# Patient Record
Sex: MALE | Race: WHITE | NOT HISPANIC OR LATINO | Employment: OTHER | URBAN - METROPOLITAN AREA
[De-identification: names, ages, dates, MRNs, and addresses within clinical notes are randomized per-mention and may not be internally consistent; named-entity substitution may affect disease eponyms.]

---

## 2023-06-06 DIAGNOSIS — Z00.00 ROUTINE GENERAL MEDICAL EXAMINATION AT A HEALTH CARE FACILITY: ICD-10-CM

## 2023-06-06 DIAGNOSIS — Z12.5 SCREENING PSA (PROSTATE SPECIFIC ANTIGEN): ICD-10-CM

## 2023-06-06 DIAGNOSIS — N18.30 STAGE 3 CHRONIC KIDNEY DISEASE, UNSPECIFIED WHETHER STAGE 3A OR 3B CKD (MULTI): ICD-10-CM

## 2023-06-06 DIAGNOSIS — I10 HYPERTENSION, UNSPECIFIED TYPE: ICD-10-CM

## 2023-06-06 PROBLEM — F32.A ACUTE DEPRESSION: Status: RESOLVED | Noted: 2023-06-06 | Resolved: 2023-06-06

## 2023-06-06 PROBLEM — K21.9 REFLUX LARYNGITIS: Status: ACTIVE | Noted: 2023-06-06

## 2023-06-06 PROBLEM — J20.9 ACUTE BRONCHITIS: Status: RESOLVED | Noted: 2023-06-06 | Resolved: 2023-06-06

## 2023-06-06 PROBLEM — H66.92 CHRONIC OTITIS MEDIA OF LEFT EAR: Status: RESOLVED | Noted: 2023-06-06 | Resolved: 2023-06-06

## 2023-06-06 PROBLEM — F41.1 ANXIETY IN ACUTE STRESS REACTION: Status: RESOLVED | Noted: 2023-06-06 | Resolved: 2023-06-06

## 2023-06-06 PROBLEM — C61 ADENOCARCINOMA OF PROSTATE (MULTI): Status: ACTIVE | Noted: 2023-06-06

## 2023-06-06 PROBLEM — H02.402 PTOSIS, LEFT EYELID: Status: ACTIVE | Noted: 2023-06-06

## 2023-06-06 PROBLEM — R05.3 CHRONIC COUGH: Status: ACTIVE | Noted: 2023-06-06

## 2023-06-06 PROBLEM — R09.A2 GLOBUS SYNDROME: Status: ACTIVE | Noted: 2023-06-06

## 2023-06-06 PROBLEM — R31.9 BLOOD IN THE URINE: Status: ACTIVE | Noted: 2023-06-06

## 2023-06-06 PROBLEM — U07.1 DISEASE DUE TO SEVERE ACUTE RESPIRATORY SYNDROME CORONAVIRUS 2 (SARS-COV-2): Status: RESOLVED | Noted: 2023-06-06 | Resolved: 2023-06-06

## 2023-06-06 PROBLEM — M66.242 NONTRAUMATIC RUPTURE OF SAGITTAL BAND OF EXTENSOR TENDON OF LEFT UPPER EXTREMITY: Status: ACTIVE | Noted: 2023-06-06

## 2023-06-06 PROBLEM — M25.819 SHOULDER IMPINGEMENT: Status: RESOLVED | Noted: 2023-06-06 | Resolved: 2023-06-06

## 2023-06-06 PROBLEM — R42 DIZZINESS: Status: ACTIVE | Noted: 2023-06-06

## 2023-06-06 PROBLEM — F43.0 ANXIETY IN ACUTE STRESS REACTION: Status: RESOLVED | Noted: 2023-06-06 | Resolved: 2023-06-06

## 2023-06-06 PROBLEM — R19.5 LOOSE STOOLS: Status: RESOLVED | Noted: 2023-06-06 | Resolved: 2023-06-06

## 2023-06-06 PROBLEM — J04.0 REFLUX LARYNGITIS: Status: ACTIVE | Noted: 2023-06-06

## 2023-06-06 PROBLEM — R06.09 DYSPNEA ON EXERTION: Status: ACTIVE | Noted: 2023-06-06

## 2023-06-06 PROBLEM — F43.21 ADJUSTMENT DISORDER WITH DEPRESSED MOOD: Status: ACTIVE | Noted: 2023-06-06

## 2023-06-06 PROBLEM — G60.9 IDIOPATHIC PERIPHERAL NEUROPATHY: Status: ACTIVE | Noted: 2023-06-06

## 2023-06-06 PROBLEM — H61.22 IMPACTED CERUMEN OF LEFT EAR: Status: RESOLVED | Noted: 2023-06-06 | Resolved: 2023-06-06

## 2023-06-06 PROBLEM — R44.3 HALLUCINATIONS: Status: ACTIVE | Noted: 2023-06-06

## 2023-06-06 PROBLEM — R93.1 ELEVATED CORONARY ARTERY CALCIUM SCORE: Status: ACTIVE | Noted: 2023-06-06

## 2023-06-06 PROBLEM — I50.30 HEART FAILURE WITH PRESERVED LEFT VENTRICULAR FUNCTION (HFPEF) (MULTI): Status: RESOLVED | Noted: 2023-06-06 | Resolved: 2023-06-06

## 2023-06-06 PROBLEM — H95.192 ENCOUNTER FOR DEBRIDEMENT OF LEFT POSTMASTOIDECTOMY CAVITY: Status: RESOLVED | Noted: 2023-06-06 | Resolved: 2023-06-06

## 2023-06-06 PROBLEM — F41.9 ANXIETY DISORDER: Status: ACTIVE | Noted: 2023-06-06

## 2023-06-06 PROBLEM — R49.0 DYSPHONIA: Status: ACTIVE | Noted: 2023-06-06

## 2023-06-06 PROBLEM — H01.009 BLEPHARITIS: Status: RESOLVED | Noted: 2023-06-06 | Resolved: 2023-06-06

## 2023-06-06 PROBLEM — R05.9 COUGH: Status: RESOLVED | Noted: 2023-06-06 | Resolved: 2023-06-06

## 2023-06-06 PROBLEM — M17.9 OSTEOARTHRITIS OF KNEE: Status: ACTIVE | Noted: 2023-06-06

## 2023-06-06 PROBLEM — M19.041 ARTHRITIS OF HAND, RIGHT, DEGENERATIVE: Status: ACTIVE | Noted: 2023-06-06

## 2023-06-06 PROBLEM — K44.9 HIATAL HERNIA: Status: ACTIVE | Noted: 2023-06-06

## 2023-06-06 PROBLEM — H10.9 CONJUNCTIVITIS: Status: RESOLVED | Noted: 2023-06-06 | Resolved: 2023-06-06

## 2023-06-06 PROBLEM — I25.10 MILD CAD: Status: ACTIVE | Noted: 2023-06-06

## 2023-06-06 PROBLEM — R55 NEAR SYNCOPE: Status: ACTIVE | Noted: 2023-06-06

## 2023-06-06 PROBLEM — G47.00 INSOMNIA: Status: ACTIVE | Noted: 2023-06-06

## 2023-06-06 PROBLEM — M47.812 CERVICAL SPONDYLOSIS WITHOUT MYELOPATHY: Status: ACTIVE | Noted: 2023-06-06

## 2023-06-06 PROBLEM — F32.3: Status: RESOLVED | Noted: 2023-06-06 | Resolved: 2023-06-06

## 2023-06-06 PROBLEM — D69.6 THROMBOCYTOPENIA (CMS-HCC): Status: ACTIVE | Noted: 2023-06-06

## 2023-06-06 PROBLEM — M65.342 TRIGGER RING FINGER OF LEFT HAND: Status: RESOLVED | Noted: 2023-06-06 | Resolved: 2023-06-06

## 2023-06-06 PROBLEM — I27.20 PULMONARY HYPERTENSION (MULTI): Status: ACTIVE | Noted: 2023-06-06

## 2023-06-06 PROBLEM — Z20.822 EXPOSURE TO COVID-19 VIRUS: Status: RESOLVED | Noted: 2023-06-06 | Resolved: 2023-06-06

## 2023-06-06 PROBLEM — R50.9 FEVER OF UNKNOWN ORIGIN: Status: RESOLVED | Noted: 2023-06-06 | Resolved: 2023-06-06

## 2023-06-06 PROBLEM — K21.9 ESOPHAGEAL REFLUX: Status: ACTIVE | Noted: 2023-06-06

## 2023-06-06 PROBLEM — R41.82 CHANGE IN MENTAL STATUS: Status: ACTIVE | Noted: 2023-06-06

## 2023-06-06 PROBLEM — M65.30 ACQUIRED TRIGGER FINGER: Status: RESOLVED | Noted: 2023-06-06 | Resolved: 2023-06-06

## 2023-06-06 PROBLEM — H66.90: Status: RESOLVED | Noted: 2023-06-06 | Resolved: 2023-06-06

## 2023-06-06 PROBLEM — M25.552 HIP PAIN, LEFT: Status: RESOLVED | Noted: 2023-06-06 | Resolved: 2023-06-06

## 2023-06-06 PROBLEM — H40.9 GLAUCOMA: Status: ACTIVE | Noted: 2023-06-06

## 2023-06-06 PROBLEM — E87.1 HYPONATREMIA: Status: ACTIVE | Noted: 2023-06-06

## 2023-06-06 PROBLEM — I87.2 VENOUS INSUFFICIENCY: Status: ACTIVE | Noted: 2023-06-06

## 2023-06-06 PROBLEM — M25.511 RIGHT SHOULDER PAIN: Status: RESOLVED | Noted: 2023-06-06 | Resolved: 2023-06-06

## 2023-06-06 PROBLEM — R27.0 ATAXIA: Status: ACTIVE | Noted: 2023-06-06

## 2023-06-06 PROBLEM — R04.2 HEMOPTYSIS: Status: ACTIVE | Noted: 2023-06-06

## 2023-06-06 PROBLEM — H61.92: Status: RESOLVED | Noted: 2023-06-06 | Resolved: 2023-06-06

## 2023-06-06 PROBLEM — H90.6 MIXED HEARING LOSS, BILATERAL: Status: ACTIVE | Noted: 2023-06-06

## 2023-06-06 PROBLEM — I50.9 CHF (CONGESTIVE HEART FAILURE) (MULTI): Status: ACTIVE | Noted: 2023-06-06

## 2023-06-06 PROBLEM — R35.0 URINARY FREQUENCY: Status: ACTIVE | Noted: 2023-06-06

## 2023-06-06 PROBLEM — R42 LIGHTHEADEDNESS: Status: RESOLVED | Noted: 2023-06-06 | Resolved: 2023-06-06

## 2023-06-06 PROBLEM — E78.00 ELEVATED CHOLESTEROL: Status: ACTIVE | Noted: 2023-06-06

## 2023-06-06 PROBLEM — K22.70 BARRETT'S ESOPHAGUS WITHOUT DYSPLASIA: Status: ACTIVE | Noted: 2023-06-06

## 2023-06-06 PROBLEM — J15.9 BACTERIAL PNEUMONIA: Status: RESOLVED | Noted: 2023-06-06 | Resolved: 2023-06-06

## 2023-06-06 PROBLEM — R60.9 EDEMA: Status: ACTIVE | Noted: 2023-06-06

## 2023-06-06 PROBLEM — R11.10 GASTRIC REGURGITATION: Status: ACTIVE | Noted: 2023-06-06

## 2023-06-06 PROBLEM — R14.3 FLATUS: Status: RESOLVED | Noted: 2023-06-06 | Resolved: 2023-06-06

## 2023-06-06 PROBLEM — K21.00 REFLUX ESOPHAGITIS: Status: ACTIVE | Noted: 2023-06-06

## 2023-06-06 PROBLEM — I49.8 VENTRICULAR BIGEMINY: Status: ACTIVE | Noted: 2023-06-06

## 2023-06-06 RX ORDER — NEPAFENAC 1 MG/ML
1 SUSPENSION/ DROPS OPHTHALMIC 3 TIMES DAILY
COMMUNITY

## 2023-06-06 RX ORDER — MULTIVIT-MIN/FA/LYCOPEN/LUTEIN .4-300-25
1 TABLET ORAL DAILY
COMMUNITY

## 2023-06-06 RX ORDER — METOPROLOL SUCCINATE 100 MG/1
1 TABLET, EXTENDED RELEASE ORAL 2 TIMES DAILY
COMMUNITY
Start: 2014-02-05 | End: 2023-06-19 | Stop reason: ALTCHOICE

## 2023-06-06 RX ORDER — DULOXETIN HYDROCHLORIDE 20 MG/1
20 CAPSULE, DELAYED RELEASE ORAL DAILY
COMMUNITY
End: 2023-06-19 | Stop reason: ALTCHOICE

## 2023-06-06 RX ORDER — LOSARTAN POTASSIUM 25 MG/1
1 TABLET ORAL DAILY
COMMUNITY
Start: 2021-10-18 | End: 2023-06-19 | Stop reason: ALTCHOICE

## 2023-06-06 RX ORDER — ISOSORBIDE MONONITRATE 60 MG/1
1 TABLET, EXTENDED RELEASE ORAL DAILY
COMMUNITY
Start: 2022-02-15 | End: 2023-06-19 | Stop reason: ALTCHOICE

## 2023-06-06 RX ORDER — FUROSEMIDE 20 MG/1
1 TABLET ORAL DAILY
COMMUNITY
Start: 2017-12-04 | End: 2023-10-27 | Stop reason: SDUPTHER

## 2023-06-06 RX ORDER — ASPIRIN 81 MG/1
1 TABLET ORAL DAILY
COMMUNITY
Start: 2017-12-12

## 2023-06-06 RX ORDER — ESCITALOPRAM OXALATE 10 MG/1
1 TABLET ORAL DAILY
COMMUNITY
Start: 2022-10-25 | End: 2024-02-19 | Stop reason: ALTCHOICE

## 2023-06-06 RX ORDER — ACETAMINOPHEN 500 MG
1 TABLET ORAL DAILY
COMMUNITY
End: 2024-05-17 | Stop reason: ALTCHOICE

## 2023-06-06 RX ORDER — MELATONIN 10 MG
1 CAPSULE ORAL NIGHTLY
COMMUNITY
Start: 2019-06-11

## 2023-06-06 RX ORDER — DICLOFENAC SODIUM 1 MG/ML
1 SOLUTION/ DROPS OPHTHALMIC
COMMUNITY
Start: 2020-10-20 | End: 2023-10-27 | Stop reason: WASHOUT

## 2023-06-12 ENCOUNTER — LAB (OUTPATIENT)
Dept: LAB | Facility: LAB | Age: 88
End: 2023-06-12
Payer: MEDICARE

## 2023-06-12 DIAGNOSIS — I10 HYPERTENSION, UNSPECIFIED TYPE: ICD-10-CM

## 2023-06-12 DIAGNOSIS — Z12.5 SCREENING PSA (PROSTATE SPECIFIC ANTIGEN): ICD-10-CM

## 2023-06-12 DIAGNOSIS — N18.30 STAGE 3 CHRONIC KIDNEY DISEASE, UNSPECIFIED WHETHER STAGE 3A OR 3B CKD (MULTI): ICD-10-CM

## 2023-06-12 LAB
ALANINE AMINOTRANSFERASE (SGPT) (U/L) IN SER/PLAS: 14 U/L (ref 10–52)
ALBUMIN (G/DL) IN SER/PLAS: 4.1 G/DL (ref 3.4–5)
ANION GAP IN SER/PLAS: 13 MMOL/L (ref 10–20)
APPEARANCE, URINE: CLEAR
ASPARTATE AMINOTRANSFERASE (SGOT) (U/L) IN SER/PLAS: 18 U/L (ref 9–39)
BASOPHILS (10*3/UL) IN BLOOD BY AUTOMATED COUNT: 0.05 X10E9/L (ref 0–0.1)
BASOPHILS/100 LEUKOCYTES IN BLOOD BY AUTOMATED COUNT: 0.5 % (ref 0–2)
BILIRUBIN, URINE: NEGATIVE
BLOOD, URINE: NEGATIVE
CALCIUM (MG/DL) IN SER/PLAS: 9.8 MG/DL (ref 8.6–10.6)
CARBON DIOXIDE, TOTAL (MMOL/L) IN SER/PLAS: 29 MMOL/L (ref 21–32)
CHLORIDE (MMOL/L) IN SER/PLAS: 99 MMOL/L (ref 98–107)
CHOLESTEROL (MG/DL) IN SER/PLAS: 145 MG/DL (ref 0–199)
CHOLESTEROL IN HDL (MG/DL) IN SER/PLAS: 46.4 MG/DL
CHOLESTEROL/HDL RATIO: 3.1
COLOR, URINE: YELLOW
CREATININE (MG/DL) IN SER/PLAS: 1.53 MG/DL (ref 0.5–1.3)
EOSINOPHILS (10*3/UL) IN BLOOD BY AUTOMATED COUNT: 0.54 X10E9/L (ref 0–0.4)
EOSINOPHILS/100 LEUKOCYTES IN BLOOD BY AUTOMATED COUNT: 5.6 % (ref 0–6)
ERYTHROCYTE DISTRIBUTION WIDTH (RATIO) BY AUTOMATED COUNT: 14.7 % (ref 11.5–14.5)
ERYTHROCYTE MEAN CORPUSCULAR HEMOGLOBIN CONCENTRATION (G/DL) BY AUTOMATED: 31.7 G/DL (ref 32–36)
ERYTHROCYTE MEAN CORPUSCULAR VOLUME (FL) BY AUTOMATED COUNT: 94 FL (ref 80–100)
ERYTHROCYTES (10*6/UL) IN BLOOD BY AUTOMATED COUNT: 4.2 X10E12/L (ref 4.5–5.9)
GFR MALE: 41 ML/MIN/1.73M2
GLUCOSE (MG/DL) IN SER/PLAS: 90 MG/DL (ref 74–99)
GLUCOSE, URINE: NEGATIVE MG/DL
HEMATOCRIT (%) IN BLOOD BY AUTOMATED COUNT: 39.4 % (ref 41–52)
HEMOGLOBIN (G/DL) IN BLOOD: 12.5 G/DL (ref 13.5–17.5)
IMMATURE GRANULOCYTES/100 LEUKOCYTES IN BLOOD BY AUTOMATED COUNT: 0.3 % (ref 0–0.9)
KETONES, URINE: NEGATIVE MG/DL
LDL: 71 MG/DL (ref 0–99)
LEUKOCYTE ESTERASE, URINE: NEGATIVE
LEUKOCYTES (10*3/UL) IN BLOOD BY AUTOMATED COUNT: 9.7 X10E9/L (ref 4.4–11.3)
LYMPHOCYTES (10*3/UL) IN BLOOD BY AUTOMATED COUNT: 1.65 X10E9/L (ref 0.8–3)
LYMPHOCYTES/100 LEUKOCYTES IN BLOOD BY AUTOMATED COUNT: 17 % (ref 13–44)
MONOCYTES (10*3/UL) IN BLOOD BY AUTOMATED COUNT: 0.79 X10E9/L (ref 0.05–0.8)
MONOCYTES/100 LEUKOCYTES IN BLOOD BY AUTOMATED COUNT: 8.1 % (ref 2–10)
NEUTROPHILS (10*3/UL) IN BLOOD BY AUTOMATED COUNT: 6.66 X10E9/L (ref 1.6–5.5)
NEUTROPHILS/100 LEUKOCYTES IN BLOOD BY AUTOMATED COUNT: 68.5 % (ref 40–80)
NITRITE, URINE: NEGATIVE
NRBC (PER 100 WBCS) BY AUTOMATED COUNT: 0 /100 WBC (ref 0–0)
PH, URINE: 5 (ref 5–8)
PHOSPHATE (MG/DL) IN SER/PLAS: 4 MG/DL (ref 2.5–4.9)
PLATELETS (10*3/UL) IN BLOOD AUTOMATED COUNT: 149 X10E9/L (ref 150–450)
POTASSIUM (MMOL/L) IN SER/PLAS: 4.8 MMOL/L (ref 3.5–5.3)
PROSTATE SPECIFIC AG (NG/ML) IN SER/PLAS: 2.71 NG/ML (ref 0–4)
PROTEIN, URINE: NEGATIVE MG/DL
SODIUM (MMOL/L) IN SER/PLAS: 136 MMOL/L (ref 136–145)
SPECIFIC GRAVITY, URINE: 1.01 (ref 1–1.03)
THYROTROPIN (MIU/L) IN SER/PLAS BY DETECTION LIMIT <= 0.05 MIU/L: 3.73 MIU/L (ref 0.44–3.98)
TRIGLYCERIDE (MG/DL) IN SER/PLAS: 138 MG/DL (ref 0–149)
UREA NITROGEN (MG/DL) IN SER/PLAS: 34 MG/DL (ref 6–23)
UROBILINOGEN, URINE: <2 MG/DL (ref 0–1.9)
VLDL: 28 MG/DL (ref 0–40)

## 2023-06-12 PROCEDURE — 84450 TRANSFERASE (AST) (SGOT): CPT

## 2023-06-12 PROCEDURE — 84443 ASSAY THYROID STIM HORMONE: CPT

## 2023-06-12 PROCEDURE — 84460 ALANINE AMINO (ALT) (SGPT): CPT

## 2023-06-12 PROCEDURE — 80061 LIPID PANEL: CPT

## 2023-06-12 PROCEDURE — 85025 COMPLETE CBC W/AUTO DIFF WBC: CPT

## 2023-06-12 PROCEDURE — 80069 RENAL FUNCTION PANEL: CPT

## 2023-06-12 PROCEDURE — 81003 URINALYSIS AUTO W/O SCOPE: CPT

## 2023-06-12 PROCEDURE — 36415 COLL VENOUS BLD VENIPUNCTURE: CPT

## 2023-06-12 PROCEDURE — G0103 PSA SCREENING: HCPCS

## 2023-06-19 ENCOUNTER — OFFICE VISIT (OUTPATIENT)
Dept: PRIMARY CARE | Facility: CLINIC | Age: 88
End: 2023-06-19
Payer: MEDICARE

## 2023-06-19 VITALS
HEART RATE: 68 BPM | BODY MASS INDEX: 30.29 KG/M2 | WEIGHT: 193 LBS | DIASTOLIC BLOOD PRESSURE: 60 MMHG | RESPIRATION RATE: 16 BRPM | SYSTOLIC BLOOD PRESSURE: 140 MMHG | HEIGHT: 67 IN

## 2023-06-19 DIAGNOSIS — R06.09 DYSPNEA ON EXERTION: ICD-10-CM

## 2023-06-19 DIAGNOSIS — N18.31 STAGE 3A CHRONIC KIDNEY DISEASE (MULTI): ICD-10-CM

## 2023-06-19 DIAGNOSIS — C61 ADENOCARCINOMA OF PROSTATE (MULTI): ICD-10-CM

## 2023-06-19 DIAGNOSIS — Z00.00 ROUTINE GENERAL MEDICAL EXAMINATION AT HEALTH CARE FACILITY: ICD-10-CM

## 2023-06-19 DIAGNOSIS — Z00.00 ROUTINE GENERAL MEDICAL EXAMINATION AT A HEALTH CARE FACILITY: Primary | ICD-10-CM

## 2023-06-19 DIAGNOSIS — K44.9 HIATAL HERNIA: ICD-10-CM

## 2023-06-19 DIAGNOSIS — K22.70 BARRETT'S ESOPHAGUS WITHOUT DYSPLASIA: ICD-10-CM

## 2023-06-19 DIAGNOSIS — I87.2 VENOUS INSUFFICIENCY: ICD-10-CM

## 2023-06-19 DIAGNOSIS — R27.0 ATAXIA: ICD-10-CM

## 2023-06-19 DIAGNOSIS — G60.9 IDIOPATHIC PERIPHERAL NEUROPATHY: ICD-10-CM

## 2023-06-19 DIAGNOSIS — F43.21 ADJUSTMENT DISORDER WITH DEPRESSED MOOD: ICD-10-CM

## 2023-06-19 DIAGNOSIS — I27.20 PULMONARY HYPERTENSION (MULTI): ICD-10-CM

## 2023-06-19 DIAGNOSIS — K21.9 GASTROESOPHAGEAL REFLUX DISEASE, UNSPECIFIED WHETHER ESOPHAGITIS PRESENT: ICD-10-CM

## 2023-06-19 DIAGNOSIS — I50.9 CONGESTIVE HEART FAILURE, UNSPECIFIED HF CHRONICITY, UNSPECIFIED HEART FAILURE TYPE (MULTI): ICD-10-CM

## 2023-06-19 DIAGNOSIS — H90.6 MIXED HEARING LOSS, BILATERAL: ICD-10-CM

## 2023-06-19 DIAGNOSIS — I10 HYPERTENSION, UNSPECIFIED TYPE: ICD-10-CM

## 2023-06-19 DIAGNOSIS — R60.0 LOCALIZED EDEMA: ICD-10-CM

## 2023-06-19 DIAGNOSIS — H40.9 GLAUCOMA, UNSPECIFIED GLAUCOMA TYPE, UNSPECIFIED LATERALITY: ICD-10-CM

## 2023-06-19 DIAGNOSIS — D69.6 THROMBOCYTOPENIA (CMS-HCC): ICD-10-CM

## 2023-06-19 DIAGNOSIS — E87.1 HYPONATREMIA: ICD-10-CM

## 2023-06-19 PROCEDURE — G0439 PPPS, SUBSEQ VISIT: HCPCS | Performed by: INTERNAL MEDICINE

## 2023-06-19 PROCEDURE — 1159F MED LIST DOCD IN RCRD: CPT | Performed by: INTERNAL MEDICINE

## 2023-06-19 PROCEDURE — 1126F AMNT PAIN NOTED NONE PRSNT: CPT | Performed by: INTERNAL MEDICINE

## 2023-06-19 PROCEDURE — 1160F RVW MEDS BY RX/DR IN RCRD: CPT | Performed by: INTERNAL MEDICINE

## 2023-06-19 PROCEDURE — 3078F DIAST BP <80 MM HG: CPT | Performed by: INTERNAL MEDICINE

## 2023-06-19 PROCEDURE — 3077F SYST BP >= 140 MM HG: CPT | Performed by: INTERNAL MEDICINE

## 2023-06-19 PROCEDURE — 99397 PER PM REEVAL EST PAT 65+ YR: CPT | Performed by: INTERNAL MEDICINE

## 2023-06-19 PROCEDURE — 1170F FXNL STATUS ASSESSED: CPT | Performed by: INTERNAL MEDICINE

## 2023-06-19 PROCEDURE — 99214 OFFICE O/P EST MOD 30 MIN: CPT | Performed by: INTERNAL MEDICINE

## 2023-06-19 PROCEDURE — 1036F TOBACCO NON-USER: CPT | Performed by: INTERNAL MEDICINE

## 2023-06-19 RX ORDER — METOPROLOL SUCCINATE 100 MG/1
100 TABLET, EXTENDED RELEASE ORAL DAILY
Start: 2023-06-19

## 2023-06-19 RX ORDER — SPIRONOLACTONE 25 MG/1
1 TABLET ORAL DAILY
COMMUNITY
Start: 2017-12-11 | End: 2024-05-01

## 2023-06-19 RX ORDER — TRAZODONE HYDROCHLORIDE 50 MG/1
TABLET ORAL
COMMUNITY
Start: 2022-02-14 | End: 2024-03-11 | Stop reason: SDUPTHER

## 2023-06-19 RX ORDER — MIRTAZAPINE 7.5 MG/1
TABLET, FILM COATED ORAL
COMMUNITY
Start: 2022-02-14 | End: 2023-12-11 | Stop reason: SDUPTHER

## 2023-06-19 RX ORDER — LOSARTAN POTASSIUM 100 MG/1
1 TABLET ORAL DAILY
COMMUNITY
End: 2023-06-19 | Stop reason: ALTCHOICE

## 2023-06-19 RX ORDER — OMEPRAZOLE 20 MG/1
20 CAPSULE, DELAYED RELEASE ORAL
COMMUNITY
Start: 2013-09-05 | End: 2024-03-07

## 2023-06-19 RX ORDER — LOSARTAN POTASSIUM 25 MG/1
25 TABLET ORAL DAILY
Start: 2023-06-19 | End: 2023-11-22

## 2023-06-19 RX ORDER — SIMVASTATIN 20 MG/1
1 TABLET, FILM COATED ORAL NIGHTLY
COMMUNITY
Start: 2016-05-02 | End: 2024-01-29

## 2023-06-19 RX ORDER — AMLODIPINE BESYLATE 10 MG/1
10 TABLET ORAL
COMMUNITY
End: 2023-06-19 | Stop reason: ALTCHOICE

## 2023-06-19 RX ORDER — TIMOLOL MALEATE 5 MG/ML
SOLUTION/ DROPS OPHTHALMIC EVERY 12 HOURS
COMMUNITY

## 2023-06-19 RX ORDER — ISOSORBIDE MONONITRATE 120 MG/1
TABLET, EXTENDED RELEASE ORAL
COMMUNITY
End: 2024-05-17 | Stop reason: ALTCHOICE

## 2023-06-19 RX ORDER — METOPROLOL SUCCINATE 50 MG/1
50 TABLET, EXTENDED RELEASE ORAL
COMMUNITY
End: 2024-02-19 | Stop reason: ALTCHOICE

## 2023-06-19 ASSESSMENT — PATIENT HEALTH QUESTIONNAIRE - PHQ9
2. FEELING DOWN, DEPRESSED OR HOPELESS: MORE THAN HALF THE DAYS
10. IF YOU CHECKED OFF ANY PROBLEMS, HOW DIFFICULT HAVE THESE PROBLEMS MADE IT FOR YOU TO DO YOUR WORK, TAKE CARE OF THINGS AT HOME, OR GET ALONG WITH OTHER PEOPLE: SOMEWHAT DIFFICULT
1. LITTLE INTEREST OR PLEASURE IN DOING THINGS: NOT AT ALL
SUM OF ALL RESPONSES TO PHQ9 QUESTIONS 1 AND 2: 2

## 2023-06-19 ASSESSMENT — ACTIVITIES OF DAILY LIVING (ADL)
DOING_HOUSEWORK: INDEPENDENT
BATHING: INDEPENDENT
DRESSING: INDEPENDENT
GROCERY_SHOPPING: INDEPENDENT
TAKING_MEDICATION: INDEPENDENT
MANAGING_FINANCES: INDEPENDENT

## 2023-06-19 ASSESSMENT — ENCOUNTER SYMPTOMS: OCCASIONAL FEELINGS OF UNSTEADINESS: 1

## 2023-06-19 NOTE — PATIENT INSTRUCTIONS
Overall you are in good health today for your age and chronic problems  Age and gender appropriate wellness services reviewed  Your heart function is satisfactory on current medical therapy  Age and gender appropriate cancer screening prevention is up-to-date without evidence for recurrent prostate cancer  Immunizations are up-to-date  Kidney function is appropriate for age  At risk for falls, aware with proper use of cane  Adjustment disorder with depressed mood relative to wife's death doing as well as expected  I agree with consideration of assisted living, appropriate candidate who would benefit

## 2023-06-20 PROBLEM — R60.0 BILATERAL LOWER EXTREMITY EDEMA: Status: ACTIVE | Noted: 2023-06-20

## 2023-06-20 PROBLEM — H04.223 EPIPHORA DUE TO INSUFFICIENT DRAINAGE OF BOTH SIDES: Status: ACTIVE | Noted: 2018-03-07

## 2023-06-20 PROBLEM — E78.5 HYPERLIPIDEMIA: Status: RESOLVED | Noted: 2023-06-20 | Resolved: 2023-06-20

## 2023-06-20 PROBLEM — H02.106 ECTROPION DUE TO LAXITY OF LEFT EYELID: Status: ACTIVE | Noted: 2018-05-24

## 2023-06-20 PROBLEM — H02.403 PTOSIS OF EYELID, BILATERAL: Status: RESOLVED | Noted: 2017-09-13 | Resolved: 2023-06-20

## 2023-06-20 PROBLEM — K21.9 GERD (GASTROESOPHAGEAL REFLUX DISEASE): Status: RESOLVED | Noted: 2023-06-20 | Resolved: 2023-06-20

## 2023-06-20 PROBLEM — H02.135 SENILE ECTROPION OF LEFT LOWER EYELID: Status: RESOLVED | Noted: 2018-03-07 | Resolved: 2023-06-20

## 2023-07-20 ASSESSMENT — ENCOUNTER SYMPTOMS
CHEST TIGHTNESS: 0
FEVER: 0
ENDOCRINE NEGATIVE: 1
VOICE CHANGE: 0
UNEXPECTED WEIGHT CHANGE: 0
LIGHT-HEADEDNESS: 0
NUMBNESS: 0
HEADACHES: 0
RESPIRATORY NEGATIVE: 1
BACK PAIN: 0
DEPRESSION: 1
FATIGUE: 0
DIARRHEA: 0
DIZZINESS: 0
ABDOMINAL PAIN: 0
GASTROINTESTINAL NEGATIVE: 1
ARTHRALGIAS: 0
CONFUSION: 0
COUGH: 0
LOSS OF SENSATION IN FEET: 1
DYSPHORIC MOOD: 1
SLEEP DISTURBANCE: 0
SORE THROAT: 0
WEAKNESS: 0
TROUBLE SWALLOWING: 0
CARDIOVASCULAR NEGATIVE: 1
NERVOUS/ANXIOUS: 0
TREMORS: 0
CONSTITUTIONAL NEGATIVE: 1
VOMITING: 0
NAUSEA: 0
HALLUCINATIONS: 0
SHORTNESS OF BREATH: 0
ABDOMINAL DISTENTION: 0
CONSTIPATION: 0
HEMATURIA: 0
FREQUENCY: 1

## 2023-07-20 ASSESSMENT — ACTIVITIES OF DAILY LIVING (ADL)
DRESSING: INDEPENDENT
BATHING: INDEPENDENT

## 2023-07-20 ASSESSMENT — PATIENT HEALTH QUESTIONNAIRE - PHQ9
SUM OF ALL RESPONSES TO PHQ9 QUESTIONS 1 AND 2: 2
2. FEELING DOWN, DEPRESSED OR HOPELESS: SEVERAL DAYS
1. LITTLE INTEREST OR PLEASURE IN DOING THINGS: SEVERAL DAYS

## 2023-07-20 NOTE — PROGRESS NOTES
Subjective   Patient ID: TORIE Watts is a 89 y.o. male who presents for Medicare Annual Wellness Visit Subsequent.  HPI  Wife -considering moving to [facility], Dr. Sandy  PSA?  Check heart and kidney function  Hiatal hernia improved with the omeprazole, no heartburn or nagging cough    LEGACY VISIT 6/15/2022  Review of Systems   Constitutional: Negative.  Negative for fatigue, fever and unexpected weight change.        Weight 194 pounds   HENT:  Positive for hearing loss. Negative for dental problem, sore throat, tinnitus, trouble swallowing and voice change.    Eyes:  Negative for visual disturbance.        Eye exam up-to-date   Respiratory: Negative.  Negative for cough, chest tightness and shortness of breath.    Cardiovascular: Negative.    Gastrointestinal: Negative.  Negative for abdominal distention, abdominal pain, constipation, diarrhea, nausea and vomiting.        Metamucil helps   Endocrine: Negative.    Genitourinary:  Positive for frequency. Negative for hematuria, scrotal swelling and testicular pain.        Nocturia   Musculoskeletal:  Positive for gait problem. Negative for arthralgias and back pain.        Uses cane   Skin: Negative.         Dermatology consult up-to-date   Neurological:  Negative for dizziness, tremors, weakness, light-headedness, numbness and headaches.   Psychiatric/Behavioral:  Positive for dysphoric mood. Negative for confusion, hallucinations, sleep disturbance and suicidal ideas. The patient is not nervous/anxious.      Objective   Physical Exam  Vitals reviewed.   Constitutional:       General: He is not in acute distress.     Appearance: Normal appearance.   HENT:      Head: Normocephalic.      Right Ear: Tympanic membrane normal.      Left Ear: Tympanic membrane normal.      Ears:      Comments: Bilateral hearing aids     Nose: Nose normal.      Mouth/Throat:      Mouth: Mucous membranes are moist.      Pharynx: Oropharynx is clear.   Eyes:       General: No scleral icterus.     Extraocular Movements: Extraocular movements intact.      Conjunctiva/sclera: Conjunctivae normal.   Neck:      Vascular: No carotid bruit.      Comments: No thyromegaly or JVD  Cardiovascular:      Rate and Rhythm: Normal rate and regular rhythm.      Pulses: Normal pulses.      Heart sounds: Normal heart sounds. No murmur heard.  Pulmonary:      Effort: Pulmonary effort is normal.      Breath sounds: Normal breath sounds. No wheezing, rhonchi or rales.   Chest:      Comments: Mild gynecomastia  Abdominal:      General: Abdomen is flat. Bowel sounds are normal. There is no distension.      Palpations: Abdomen is soft. There is no mass.      Tenderness: There is no abdominal tenderness. There is no guarding.      Hernia: No hernia is present.   Genitourinary:     Penis: Normal.       Testes: Normal.      Comments: Circumcised  External anal inspection normal, no WU  Musculoskeletal:         General: Deformity present.      Cervical back: Normal range of motion and neck supple. No tenderness.      Right lower leg: Edema present.      Left lower leg: Edema present.      Comments: Bilateral knee surgical scars  Mild ankle edema support hose   Lymphadenopathy:      Cervical: No cervical adenopathy.   Skin:     General: Skin is warm.      Capillary Refill: Capillary refill takes less than 2 seconds.      Comments: Diffuse keratosis and nevi   Neurological:      General: No focal deficit present.      Mental Status: He is alert and oriented to person, place, and time.      Cranial Nerves: No cranial nerve deficit.      Sensory: Sensory deficit present.      Motor: No weakness.      Coordination: Coordination normal.      Gait: Gait abnormal.      Deep Tendon Reflexes: Reflexes normal.      Comments: Decreased distal vibratory and position sense   Psychiatric:         Mood and Affect: Mood normal.         Behavior: Behavior normal.         Thought Content: Thought content normal.          "Judgment: Judgment normal.       /60 (BP Location: Left arm, Patient Position: Sitting)   Pulse 68   Resp 16   Ht 1.702 m (5' 7\")   Wt 87.5 kg (193 lb)   BMI 30.23 kg/m²     Data  Electrocardiogram 1/5/2023  Laboratory 6/12 creatinine 1.5, GFR 41, lipid normal, PSA 2.7, hemoglobin/hematocrit 12.5/39, platelets 149 K  Psychiatry consult 6/5 reviewed  GI consult 4/5 reviewed  Cardiology consult 3/14 reviewed  Nephrology consult 3/10 reviewed  Ophthalmology consult 11/9/2021 reviewed  CT head 6/29/2017  CT internal auditory canal 5/27/2011  MRI internal auditory canal 10/19/2012  Echocardiogram 11/16/2017  Chest x-ray 3/28/2019  X-ray cervical spine 1/20/2023  Ultrasound kidneys 4/15/2021  EGD 2/3/2015  Audiology 3/3/2021    Assessment/Plan     #1 overall you are in good health today for your advanced age and chronic medical problems which are compensated on the current therapy  #2 you have mild weakened heart muscle or cardiomyopathy which is well compensated and improved on current medical therapy, continue current medical therapy and lifestyle changes including low-sodium diet, leg elevation/support and change positions against gravity slowly carefully and deliberately  #3 age and gender appropriate cancer screening and prevention is up-to-date with no clinical evidence of recurrent prostate cancer and skin follow-up  #4 immunizations are up-to-date  #5 idiopathic peripheral neuropathy-numbness and tingling, loss of position sense in the legs, may increase risk of falls  #6 peripheral edema most likely multifactorial cardiomyopathy, chronic venous insufficiency, improved with conservative care  #7 hyponatremia-maintains near normal/low normal  #8 gynecomastia-likely secondary to advanced age, low testosterone and/or side effect of spironolactone  #9 at risk for falls secondary to advanced age, peripheral neuropathy, polypharmacy, underlying cardiovascular disease; falls precautions reinforced use of cane " safe environment  #10 depression doing well as expected, coping with wife's death and exploring his future options  11 chronic kidney disease stable; maintain proper fluid balance, blood pressure, avoid/limit potential medications or x-ray contrast which may affect kidneys, and be aware of medications to be adjusted for your level of kidney function  12 patient has living will and DURABLE POWER OF  for healthcare  Problem List Items Addressed This Visit       Adenocarcinoma of prostate (CMS/[facility])    Adjustment disorder with depressed mood    Ataxia    Hinkle's esophagus without dysplasia    CKD (chronic kidney disease) stage 3, GFR 30-59 ml/min (CMS/[facility])    Dyspnea on exertion - Primary    Relevant Medications    losartan (Cozaar) 25 mg tablet    metoprolol succinate XL (Toprol-XL) 100 mg 24 hr tablet    Edema    Glaucoma    CHF (congestive heart failure) (CMS/HCC)    Relevant Medications    isosorbide mononitrate ER (Imdur) 120 mg 24 hr tablet    metoprolol succinate XL (Toprol-XL) 50 mg 24 hr tablet    metoprolol succinate XL (Toprol-XL) 100 mg 24 hr tablet    Esophageal reflux    Hiatal hernia    Hyponatremia    Idiopathic peripheral neuropathy    Mixed hearing loss, bilateral    Hypertension    Pulmonary hypertension (CMS/HCC)    Thrombocytopenia (CMS/HCC)    Venous insufficiency

## 2023-10-16 ENCOUNTER — TELEPHONE (OUTPATIENT)
Dept: PRIMARY CARE | Facility: CLINIC | Age: 88
End: 2023-10-16
Payer: MEDICARE

## 2023-10-16 DIAGNOSIS — N18.32 STAGE 3B CHRONIC KIDNEY DISEASE (MULTI): Primary | ICD-10-CM

## 2023-10-20 ENCOUNTER — OFFICE VISIT (OUTPATIENT)
Dept: PRIMARY CARE | Facility: CLINIC | Age: 88
End: 2023-10-20
Payer: MEDICARE

## 2023-10-20 DIAGNOSIS — R60.0 LOCALIZED EDEMA: ICD-10-CM

## 2023-10-20 DIAGNOSIS — N18.32 STAGE 3B CHRONIC KIDNEY DISEASE (MULTI): ICD-10-CM

## 2023-10-20 DIAGNOSIS — L57.0 ACTINIC KERATOSES: Primary | ICD-10-CM

## 2023-10-20 DIAGNOSIS — E87.1 HYPONATREMIA: ICD-10-CM

## 2023-10-20 LAB
BASOPHILS # BLD AUTO: 0.05 X10*3/UL (ref 0–0.1)
BASOPHILS NFR BLD AUTO: 0.6 %
EOSINOPHIL # BLD AUTO: 0.56 X10*3/UL (ref 0–0.4)
EOSINOPHIL NFR BLD AUTO: 6.2 %
ERYTHROCYTE [DISTWIDTH] IN BLOOD BY AUTOMATED COUNT: 13.6 % (ref 11.5–14.5)
HCT VFR BLD AUTO: 38.3 % (ref 41–52)
HGB BLD-MCNC: 11.7 G/DL (ref 13.5–17.5)
IMM GRANULOCYTES # BLD AUTO: 0.03 X10*3/UL (ref 0–0.5)
IMM GRANULOCYTES NFR BLD AUTO: 0.3 % (ref 0–0.9)
LYMPHOCYTES # BLD AUTO: 1.48 X10*3/UL (ref 0.8–3)
LYMPHOCYTES NFR BLD AUTO: 16.3 %
MCH RBC QN AUTO: 29.6 PG (ref 26–34)
MCHC RBC AUTO-ENTMCNC: 30.5 G/DL (ref 32–36)
MCV RBC AUTO: 97 FL (ref 80–100)
MONOCYTES # BLD AUTO: 0.92 X10*3/UL (ref 0.05–0.8)
MONOCYTES NFR BLD AUTO: 10.1 %
NEUTROPHILS # BLD AUTO: 6.03 X10*3/UL (ref 1.6–5.5)
NEUTROPHILS NFR BLD AUTO: 66.5 %
NRBC BLD-RTO: 0 /100 WBCS (ref 0–0)
PLATELET # BLD AUTO: 236 X10*3/UL (ref 150–450)
PMV BLD AUTO: 11.1 FL (ref 7.5–11.5)
RBC # BLD AUTO: 3.95 X10*6/UL (ref 4.5–5.9)
WBC # BLD AUTO: 9.1 X10*3/UL (ref 4.4–11.3)

## 2023-10-20 PROCEDURE — 36415 COLL VENOUS BLD VENIPUNCTURE: CPT

## 2023-10-20 PROCEDURE — 1159F MED LIST DOCD IN RCRD: CPT | Performed by: INTERNAL MEDICINE

## 2023-10-20 PROCEDURE — 3075F SYST BP GE 130 - 139MM HG: CPT | Performed by: INTERNAL MEDICINE

## 2023-10-20 PROCEDURE — 1126F AMNT PAIN NOTED NONE PRSNT: CPT | Performed by: INTERNAL MEDICINE

## 2023-10-20 PROCEDURE — 99213 OFFICE O/P EST LOW 20 MIN: CPT | Performed by: INTERNAL MEDICINE

## 2023-10-20 PROCEDURE — 1160F RVW MEDS BY RX/DR IN RCRD: CPT | Performed by: INTERNAL MEDICINE

## 2023-10-20 PROCEDURE — 3078F DIAST BP <80 MM HG: CPT | Performed by: INTERNAL MEDICINE

## 2023-10-20 PROCEDURE — 85025 COMPLETE CBC W/AUTO DIFF WBC: CPT

## 2023-10-20 PROCEDURE — 1036F TOBACCO NON-USER: CPT | Performed by: INTERNAL MEDICINE

## 2023-10-20 PROCEDURE — 80069 RENAL FUNCTION PANEL: CPT

## 2023-10-20 NOTE — PROGRESS NOTES
"Subjective   Patient ID: Jf Watts \"ZORAN" is a 89 y.o. male who presents for Follow-up.  HPI  Stay at home   Cook   2 water pills for fluid balance  Wgt stable to up     Review of Systems   Constitutional: Negative.  Negative for fatigue, fever and unexpected weight change.   HENT:  Positive for hearing loss.    Respiratory: Negative.  Negative for cough, chest tightness and shortness of breath.    Cardiovascular: Negative.    Gastrointestinal: Negative.  Negative for abdominal distention, abdominal pain, constipation, diarrhea, nausea and vomiting.        Metamucil helps   Endocrine: Negative.    Genitourinary:  Positive for frequency. Negative for hematuria, scrotal swelling and testicular pain.        Nocturia   Musculoskeletal:  Positive for gait problem. Negative for arthralgias and back pain.        Uses cane   Neurological:  Negative for dizziness, tremors, weakness, light-headedness, numbness and headaches.   Psychiatric/Behavioral:  Positive for dysphoric mood. Negative for confusion, hallucinations, sleep disturbance and suicidal ideas. The patient is not nervous/anxious.         Improved, managing affairs, withdrawn       Objective   Physical Exam  Constitutional:       Appearance: Normal appearance.   Cardiovascular:      Rate and Rhythm: Normal rate and regular rhythm.      Heart sounds: Normal heart sounds.   Pulmonary:      Effort: Pulmonary effort is normal.      Breath sounds: Normal breath sounds.   Abdominal:      General: Abdomen is flat. Bowel sounds are normal.      Palpations: Abdomen is soft.   Musculoskeletal:      Right lower leg: Edema present.      Left lower leg: Edema present.      Comments: Compression stockings, nontender   Skin:     Findings: Lesion present.      Comments: Multiple keratosis and nevi   Neurological:      General: No focal deficit present.      Mental Status: He is alert.      Gait: Gait abnormal.      Comments: Ambulatory with cane       /76   Pulse 72  "  Resp 16   Wt 88.9 kg (196 lb)   BMI 30.70 kg/m²     Data  Labs 6/12 satisfactory creatinine 1.5, GFR 41, sodium 136    Assessment/Plan   Problem List Items Addressed This Visit       CKD (chronic kidney disease) stage 3, GFR 30-59 ml/min (CMS/HCC)    Edema    Hyponatremia    Actinic keratoses - Primary    Relevant Orders    Referral to Dermatology

## 2023-10-21 LAB
ALBUMIN SERPL BCP-MCNC: 4.2 G/DL (ref 3.4–5)
ANION GAP SERPL CALC-SCNC: 15 MMOL/L (ref 10–20)
BUN SERPL-MCNC: 37 MG/DL (ref 6–23)
CALCIUM SERPL-MCNC: 9.5 MG/DL (ref 8.6–10.6)
CHLORIDE SERPL-SCNC: 98 MMOL/L (ref 98–107)
CO2 SERPL-SCNC: 29 MMOL/L (ref 21–32)
CREAT SERPL-MCNC: 1.87 MG/DL (ref 0.5–1.3)
GFR SERPL CREATININE-BSD FRML MDRD: 32 ML/MIN/1.73M*2
GLUCOSE SERPL-MCNC: 100 MG/DL (ref 74–99)
PHOSPHATE SERPL-MCNC: 3.7 MG/DL (ref 2.5–4.9)
POTASSIUM SERPL-SCNC: 4.9 MMOL/L (ref 3.5–5.3)
SODIUM SERPL-SCNC: 137 MMOL/L (ref 136–145)

## 2023-10-24 NOTE — PROGRESS NOTES
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 The cavity was cleaned from the significant amount of debris.     I will see him in 1 year.      Chief Complaint     Follow-up regarding some hearing issues      History of Present Illness    This gentleman is a friend of mine. He has had a mastoidectomy on the left side done many years ago and occasionally it needs to be cleaned. He has not had any acute issues recently. I last saw him in July 2022.      Physical Exam    The ear examination is normal on the right. On the left side he does have a large mastoid cavity. There was significant debris to clean out and this was addressed with different instruments. There is no evidence of any infection.

## 2023-10-25 ENCOUNTER — OFFICE VISIT (OUTPATIENT)
Dept: OTOLARYNGOLOGY | Facility: CLINIC | Age: 88
End: 2023-10-25
Payer: MEDICARE

## 2023-10-25 DIAGNOSIS — H93.92: Primary | ICD-10-CM

## 2023-10-25 PROCEDURE — 69220 CLEAN OUT MASTOID CAVITY: CPT | Performed by: OTOLARYNGOLOGY

## 2023-10-25 PROCEDURE — 1160F RVW MEDS BY RX/DR IN RCRD: CPT | Performed by: OTOLARYNGOLOGY

## 2023-10-25 PROCEDURE — 99213 OFFICE O/P EST LOW 20 MIN: CPT | Performed by: OTOLARYNGOLOGY

## 2023-10-25 PROCEDURE — 1159F MED LIST DOCD IN RCRD: CPT | Performed by: OTOLARYNGOLOGY

## 2023-10-25 PROCEDURE — 1036F TOBACCO NON-USER: CPT | Performed by: OTOLARYNGOLOGY

## 2023-10-25 PROCEDURE — 1126F AMNT PAIN NOTED NONE PRSNT: CPT | Performed by: OTOLARYNGOLOGY

## 2023-10-25 ASSESSMENT — PATIENT HEALTH QUESTIONNAIRE - PHQ9
1. LITTLE INTEREST OR PLEASURE IN DOING THINGS: NEARLY EVERY DAY
SUM OF ALL RESPONSES TO PHQ9 QUESTIONS 1 AND 2: 6
2. FEELING DOWN, DEPRESSED OR HOPELESS: NEARLY EVERY DAY

## 2023-10-27 ENCOUNTER — OFFICE VISIT (OUTPATIENT)
Dept: CARDIOLOGY | Facility: CLINIC | Age: 88
End: 2023-10-27
Payer: MEDICARE

## 2023-10-27 VITALS
SYSTOLIC BLOOD PRESSURE: 135 MMHG | OXYGEN SATURATION: 94 % | HEART RATE: 56 BPM | BODY MASS INDEX: 32.54 KG/M2 | HEIGHT: 66 IN | WEIGHT: 202.44 LBS | DIASTOLIC BLOOD PRESSURE: 65 MMHG

## 2023-10-27 DIAGNOSIS — I50.9 CONGESTIVE HEART FAILURE, UNSPECIFIED HF CHRONICITY, UNSPECIFIED HEART FAILURE TYPE (MULTI): Primary | ICD-10-CM

## 2023-10-27 PROCEDURE — 1159F MED LIST DOCD IN RCRD: CPT | Performed by: INTERNAL MEDICINE

## 2023-10-27 PROCEDURE — 3078F DIAST BP <80 MM HG: CPT | Performed by: INTERNAL MEDICINE

## 2023-10-27 PROCEDURE — 99214 OFFICE O/P EST MOD 30 MIN: CPT | Performed by: INTERNAL MEDICINE

## 2023-10-27 PROCEDURE — 3075F SYST BP GE 130 - 139MM HG: CPT | Performed by: INTERNAL MEDICINE

## 2023-10-27 PROCEDURE — 1036F TOBACCO NON-USER: CPT | Performed by: INTERNAL MEDICINE

## 2023-10-27 PROCEDURE — 1126F AMNT PAIN NOTED NONE PRSNT: CPT | Performed by: INTERNAL MEDICINE

## 2023-10-27 PROCEDURE — 1160F RVW MEDS BY RX/DR IN RCRD: CPT | Performed by: INTERNAL MEDICINE

## 2023-10-27 RX ORDER — FUROSEMIDE 20 MG/1
60 TABLET ORAL DAILY
Qty: 270 TABLET | Refills: 1 | Status: SHIPPED | OUTPATIENT
Start: 2023-10-27 | End: 2023-12-18 | Stop reason: SDUPTHER

## 2023-10-27 ASSESSMENT — ENCOUNTER SYMPTOMS
HEADACHES: 0
ALTERED MENTAL STATUS: 0
COUGH: 0
DYSURIA: 0
MEMORY LOSS: 0
NAUSEA: 0
FALLS: 0
VOMITING: 0
FEVER: 0
CONSTIPATION: 0
HEMOPTYSIS: 0
WHEEZING: 0
DEPRESSION: 0
CHILLS: 0
BLOATING: 0
ABDOMINAL PAIN: 0
HEMATURIA: 0
LOSS OF SENSATION IN FEET: 1
OCCASIONAL FEELINGS OF UNSTEADINESS: 1
DIARRHEA: 0
DEPRESSION: 1
MYALGIAS: 0

## 2023-10-27 ASSESSMENT — COLUMBIA-SUICIDE SEVERITY RATING SCALE - C-SSRS
6. HAVE YOU EVER DONE ANYTHING, STARTED TO DO ANYTHING, OR PREPARED TO DO ANYTHING TO END YOUR LIFE?: NO
2. HAVE YOU ACTUALLY HAD ANY THOUGHTS OF KILLING YOURSELF?: NO
1. IN THE PAST MONTH, HAVE YOU WISHED YOU WERE DEAD OR WISHED YOU COULD GO TO SLEEP AND NOT WAKE UP?: NO

## 2023-10-27 ASSESSMENT — PAIN SCALES - GENERAL: PAINLEVEL: 0-NO PAIN

## 2023-10-27 NOTE — PROGRESS NOTES
Chief complaint:  Follow-up     HPI  89 yo WM w/ h/o mild CM, HFrEF -> HFpEF, pulm HTN, mild CAD, elevated CCS, HTN, HPL, CKD, FHx CAD now here for cardiology f/u. No chest pain. No sig dyspnea at rest. +ch HANKINS (worst after bending over), prev no sig improvement on increased diuretics. +occ mild orthopnea (more when 1st getting into bed and resolves after seconds). No PND. No palps. +occ exertional/positional LH/dizziness. No syncope. +ch dependent LE edema. No claudication. +occ cough (no change on allergy med or PPI). +occ fatigue. +occ snoring (when on back). +occ B arm weakness x 30-60 seconds. Wgt remains up.  BP at home (occ checked): ~130/60, HR 60s  Wgt at home: ~197 lbs (base ~185 lbs)  ECG 6/17: SR (70), 1st deg AVB, LAFB  ECG 12/17: SR (81), LAFB, LVH w/ ST changes  ECG 12/18: SR (64), 1st deg AVB, LAFB, LVH  ECG 1/20: SB (58), 1st deg AVB, LBBB  ECG 6/22: SB (57), IVCD  ECG 1/23: SR (64), IVCD, PVCs/bigeminy  Echo 11/17: EF 35-40%, ecc LVH, DD, LAE, mild MR, mild AI  Echo 4/21: EF 50-55%, DD, mod LAE, valves ok  CCS 12/17: 744 (LM 0, , LCx 0, ), small R pleural effusion, sm-mod HH  Cath 5/18: pLAD 30%, pRCA 10-30%, LVEDP 22-27, PCW 22, PA 60/26/41, CO 5.3, CI 2.6, no shunt, no AS  CXR 1/17: no acute abnl  CXR 3/19: HH, subseg atelectasis at LL base, otherwise lungs clear  CT brain 6/17: no acute abnl     Review of Systems   Constitutional: Negative for chills, fever and malaise/fatigue.   HENT:  Negative for hearing loss.    Eyes:  Negative for visual disturbance.   Respiratory:  Negative for cough, hemoptysis and wheezing.    Skin:  Negative for rash.   Musculoskeletal:  Negative for falls and myalgias.   Gastrointestinal:  Negative for bloating, abdominal pain, constipation, diarrhea, dysphagia, nausea and vomiting.   Genitourinary:  Negative for dysuria and hematuria.   Neurological:  Negative for headaches.   Psychiatric/Behavioral:  Negative for altered mental status, depression and  memory loss.         Social History     Tobacco Use    Smoking status: Never     Passive exposure: Never    Smokeless tobacco: Never   Substance Use Topics    Alcohol use: Yes     Comment: 3/week        Family History   Problem Relation Name Age of Onset    Alcohol abuse Mother      No Known Problems Father      Heart disease Brother          Age 53    Other (Age 95) Brother      No Known Problems Daughter      No Known Problems Son      No Known Problems Son      Other (Drug overdose) Son          Allergies   Allergen Reactions    Penicillins Hives and Swelling    Sulfa (Sulfonamide Antibiotics) Hives and Swelling        Current Outpatient Medications   Medication Instructions    aspirin 81 mg EC tablet 1 tablet, oral, Daily    calcium carbonate-vitamin D3 600 mg-20 mcg (800 unit) tablet 1 tablet, oral, Daily    diclofenac (Voltaren) 0.1 % ophthalmic solution 1 drop, ophthalmic (eye), Daily RT    escitalopram (Lexapro) 10 mg tablet 1 tablet, oral, Daily    furosemide (Lasix) 20 mg tablet 1 tablet, oral, Daily    isosorbide mononitrate ER (Imdur) 120 mg 24 hr tablet oral    losartan (COZAAR) 25 mg, oral, Daily    melatonin 10 mg capsule 1 capsule, oral, Nightly    metoprolol succinate XL (TOPROL-XL) 50 mg, oral, Daily RT    metoprolol succinate XL (TOPROL-XL) 100 mg, oral, Daily    mirtazapine (Remeron) 7.5 mg tablet oral    multivit-iron-minerals-folic acid (Centrum Silver) 0.4 mg-300 mcg- 250 mcg tab 1 tablet, oral, Daily    nepafenac (Nevanac) 0.1 % ophthalmic suspension 1 drop, ophthalmic (eye), 3 times daily    omeprazole (PRILOSEC) 20 mg, oral, Daily before breakfast    simvastatin (Zocor) 20 mg tablet 1 tablet, oral, Nightly    spironolactone (Aldactone) 25 mg tablet 1 tablet, oral, Daily    timolol (Timoptic) 0.5 % ophthalmic solution ophthalmic (eye), Every 12 hours    traZODone (Desyrel) 50 mg tablet oral        Vitals:    10/27/23 1313   BP: 135/65   Pulse: 56   SpO2: 94%        Physical  Exam  Constitutional:       Appearance: Normal appearance.   HENT:      Head: Normocephalic and atraumatic.      Nose: Nose normal.   Cardiovascular:      Rate and Rhythm: Normal rate and regular rhythm.      Heart sounds: No murmur heard.  Pulmonary:      Effort: Pulmonary effort is normal.      Breath sounds: Normal breath sounds.   Abdominal:      Palpations: Abdomen is soft.      Tenderness: There is no abdominal tenderness.   Musculoskeletal:      Right lower leg: Edema present.      Left lower leg: Edema present.      Comments: 1+ pitting LE edema   Skin:     General: Skin is warm and dry.   Neurological:      General: No focal deficit present.      Mental Status: He is alert.   Psychiatric:         Mood and Affect: Mood normal.         Judgment: Judgment normal.          Lab Results   Component Value Date    CR 1.87      HGB 11.7      K 4.9      MG      BNP No results found for requested labs within last 365 days.            LDL      TSH       Assessment/Plan   89 yo WM w/ h/o mild CM, HFrEF -> HFpEF, pulm HTN, mild CAD, elevated CCS, HTN, HPL, CKD, FHx CAD now with persistent acute on chronic decompensated CHF. Will retry increasing Lasix. Continue check home weights and bring log to appts.  CM essentially resolved on BB/ARB - EF now low normal to very mild reduced at 50-55%.  L/R press elevated. Pulm HTN most likely sec to CHF. Consider PFTs (earlene if more dyspnea/wheezing). Okay to remain conservative in this elderly patient - try to minimize symptoms.  -continue ASA 81 qd  -continue Metoprolol Succinate 100 bid  -continue Losartan 25 qd   -continue Imdur 60 qd   -continue Spironolactone 25 qd  -increase Furosemide from 40 to 60 qd (kidney prev irritated on Metolazone)  -continue Simva 20 qhs  -f/u 1-2 months (earlier if needed)     Mike Lao MD

## 2023-11-01 ENCOUNTER — OFFICE VISIT (OUTPATIENT)
Dept: GASTROENTEROLOGY | Facility: CLINIC | Age: 88
End: 2023-11-01
Payer: MEDICARE

## 2023-11-01 VITALS — OXYGEN SATURATION: 93 % | WEIGHT: 202 LBS | HEART RATE: 72 BPM | HEIGHT: 66 IN | BODY MASS INDEX: 32.47 KG/M2

## 2023-11-01 DIAGNOSIS — R19.7 DIARRHEA, UNSPECIFIED TYPE: Primary | ICD-10-CM

## 2023-11-01 PROCEDURE — 1126F AMNT PAIN NOTED NONE PRSNT: CPT | Performed by: INTERNAL MEDICINE

## 2023-11-01 PROCEDURE — 1159F MED LIST DOCD IN RCRD: CPT | Performed by: INTERNAL MEDICINE

## 2023-11-01 PROCEDURE — 3078F DIAST BP <80 MM HG: CPT | Performed by: INTERNAL MEDICINE

## 2023-11-01 PROCEDURE — 1160F RVW MEDS BY RX/DR IN RCRD: CPT | Performed by: INTERNAL MEDICINE

## 2023-11-01 PROCEDURE — 1036F TOBACCO NON-USER: CPT | Performed by: INTERNAL MEDICINE

## 2023-11-01 PROCEDURE — 3075F SYST BP GE 130 - 139MM HG: CPT | Performed by: INTERNAL MEDICINE

## 2023-11-01 PROCEDURE — 99213 OFFICE O/P EST LOW 20 MIN: CPT | Performed by: INTERNAL MEDICINE

## 2023-11-01 ASSESSMENT — ENCOUNTER SYMPTOMS
FATIGUE: 0
DYSURIA: 0
SORE THROAT: 0
DIZZINESS: 0
ADENOPATHY: 0
ARTHRALGIAS: 0
SHORTNESS OF BREATH: 0
BRUISES/BLEEDS EASILY: 0
CHILLS: 0
COUGH: 0
WEAKNESS: 0
NERVOUS/ANXIOUS: 0
MYALGIAS: 0
FEVER: 0
ROS GI COMMENTS: AS PER HPI
UNEXPECTED WEIGHT CHANGE: 0
CONFUSION: 0

## 2023-11-01 NOTE — ASSESSMENT & PLAN NOTE
Has infrequent diarrhea, around once every few weeks, although the episodes are significant.  He mainly has normal bowels although he sometimes has to strain to get BMs started.  He will occasionally take Colace or Imodium based on his symptoms.  In the past we had discussed taking a daily fiber supplement, but he admits he hasn't been very regular with this, and doesn't get much dietary fiber.  No red flag symptoms such as weight loss or blood in stools.  - he will try to be regular with fiber supplementation, will take a tablespoon of Metamucil daily  - discussed trying to drink fluids and also try to increase his dietary fiber intake  - he will follow up PRN

## 2023-11-01 NOTE — PROGRESS NOTES
"Subjective     History of Present Illness   TORIE Watts is a 89 y.o. male with PMHx of HTN, HLD, SIADH, Hinkle's esophagus, prostate cancer s/p radiation beads in 1997 who presents to GI clinic for follow up.  Last seen around 6 months ago for follow up for loose stools - has straining initially but then loose stools throughout the day.  We discussed a trial of fiber supplement.  He also had chronic cough that has improved with omeprazole.  Symptoms are still the same, although he describes the diarrhea episodes as only occurring once every few weeks  Other times he has normal bowels, sometimes he needs to strain to get his BM started  Doesn't get a lot of roughage  Only taking fiber every few days, not very regular with this  Occasionally will take Imodium or Colace but only every few weeks  No blood in stools or abdominal pain, no unexplained weight loss    Past history:  Has BM every morning - solid, has to strain to pass it  Then throughout the day will have diarrhea, very loose stools - becoming problematic  Taking prune juice every other day  Doesn't recall taking fiber supplementation as discussed at last visit  Cough better now; if he has a cough he will suck on some lozenges and it will go away  He is wondering if we can decrease the dose of pantoprazole  Past history:  Getting diarrhea around every 10 days for unclear reasons  This morning had normal BM, then had sudden urge to have a BM, loose stools, takes around 30 minutes to improve, then will be OK for 10 more days  Has no BM for 2-3 days after that, but then will have normal BMs after that  He does note his stools are smaller volume than he would expect  No blood in stools, no abdominal pain  Getting low blood pressure  No weight loss, no dysphagia, good appetite  Now only on omeprazole once daily  Cough continues but he is used to it  Complains of persistent cough, was told by Dr. 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   HENT:  Negative for sore throat.    Eyes:  Negative for visual disturbance.   Respiratory:  Negative for cough and shortness of breath.    Cardiovascular:  Negative for chest pain.   Gastrointestinal:         As per HPI   Genitourinary:  Negative for dysuria.   Musculoskeletal:  Negative for arthralgias and myalgias.   Skin:  Negative for rash.   Neurological:  Negative for dizziness, syncope and weakness.   Hematological:  Negative for adenopathy. Does not bruise/bleed easily.   Psychiatric/Behavioral:  Negative for confusion. The patient is not nervous/anxious.        Allergies  Allergies   Allergen Reactions    Penicillins Hives and Swelling    Sulfa (Sulfonamide Antibiotics) Hives and Swelling       Medications  Current Outpatient Medications   Medication Instructions    aspirin 81 mg EC tablet 1 tablet, oral, Daily    calcium carbonate-vitamin D3 600 mg-20 mcg (800 unit) tablet 1 tablet, oral, Daily    escitalopram (Lexapro) 10 mg tablet 1 tablet, oral, Daily    furosemide (LASIX) 60 mg, oral, Daily    isosorbide mononitrate ER (Imdur) 120 mg 24 hr tablet oral    losartan (COZAAR) 25 mg, oral, Daily    melatonin 10 mg capsule 1 capsule, oral, Nightly    metoprolol succinate XL (TOPROL-XL) 50 mg, oral, Daily RT    metoprolol succinate XL (TOPROL-XL) 100 mg, oral, Daily    mirtazapine (Remeron) 7.5 mg tablet oral     "multivit-iron-minerals-folic acid (Centrum Silver) 0.4 mg-300 mcg- 250 mcg tab 1 tablet, oral, Daily    nepafenac (Nevanac) 0.1 % ophthalmic suspension 1 drop, ophthalmic (eye), 3 times daily    omeprazole (PRILOSEC) 20 mg, oral, Daily before breakfast    simvastatin (Zocor) 20 mg tablet 1 tablet, oral, Nightly    spironolactone (Aldactone) 25 mg tablet 1 tablet, oral, Daily    timolol (Timoptic) 0.5 % ophthalmic solution ophthalmic (eye), Every 12 hours    traZODone (Desyrel) 50 mg tablet oral        Objective     Visit Vitals  Pulse 72   Ht 1.676 m (5' 6\")   Wt 91.6 kg (202 lb)   SpO2 93%   BMI 32.60 kg/m²   Smoking Status Never   BSA 2.07 m²       Physical Exam  Constitutional:       Appearance: Normal appearance.      Comments: Walks with cane   Eyes:      Extraocular Movements: Extraocular movements intact.   Abdominal:      General: Bowel sounds are normal. There is no distension.      Palpations: Abdomen is soft.      Tenderness: There is no abdominal tenderness. There is no guarding or rebound.   Skin:     General: Skin is warm and dry.   Neurological:      General: No focal deficit present.      Mental Status: He is alert.   Psychiatric:         Mood and Affect: Mood normal.         Behavior: Behavior normal.           Lab Results   Component Value Date    WBC 9.1 10/20/2023    WBC 9.7 06/12/2023    HGB 11.7 (L) 10/20/2023    HGB 12.5 (L) 06/12/2023    HCT 38.3 (L) 10/20/2023    HCT 39.4 (L) 06/12/2023    MCV 97 10/20/2023    MCV 94 06/12/2023     10/20/2023     (L) 06/12/2023     Lab Results   Component Value Date     10/20/2023     06/12/2023    K 4.9 10/20/2023    K 4.8 06/12/2023    CL 98 10/20/2023    CL 99 06/12/2023    CO2 29 10/20/2023    CO2 29 06/12/2023    BUN 37 (H) 10/20/2023    BUN 34 (H) 06/12/2023    CREATININE 1.87 (H) 10/20/2023    CREATININE 1.53 (H) 06/12/2023    CALCIUM 9.5 10/20/2023    CALCIUM 9.8 06/12/2023    PROT 6.2 (L) 01/04/2023    PROT 6.7 10/25/2022 "    BILITOT 0.6 01/04/2023    BILITOT 0.5 10/25/2022    ALKPHOS 62 01/04/2023    ALKPHOS 65 10/25/2022    ALT 14 06/12/2023    ALT 11 01/04/2023    AST 18 06/12/2023    AST 15 01/04/2023    GLUCOSE 100 (H) 10/20/2023    GLUCOSE 90 06/12/2023       Recent Imaging  No results found.    Assessment/Plan    TORIE Watts is a 89 y.o. male who presents to GI clinic for follow up for occasional diarrhea.    Diarrhea  Has infrequent diarrhea, around once every few weeks, although the episodes are significant.  He mainly has normal bowels although he sometimes has to strain to get BMs started.  He will occasionally take Colace or Imodium based on his symptoms.  In the past we had discussed taking a daily fiber supplement, but he admits he hasn't been very regular with this, and doesn't get much dietary fiber.  No red flag symptoms such as weight loss or blood in stools.  - he will try to be regular with fiber supplementation, will take a tablespoon of Metamucil daily  - discussed trying to drink fluids and also try to increase his dietary fiber intake  - he will follow up PRN       Karel Mcgowan MD

## 2023-11-01 NOTE — PATIENT INSTRUCTIONS
Good to see you!  I would recommend taking Metamucil one tablespoon every day to see if this regulates the bowels and helps prevent episodes of diarrhea.

## 2023-11-15 VITALS
DIASTOLIC BLOOD PRESSURE: 76 MMHG | BODY MASS INDEX: 30.7 KG/M2 | HEART RATE: 72 BPM | SYSTOLIC BLOOD PRESSURE: 130 MMHG | RESPIRATION RATE: 16 BRPM | WEIGHT: 196 LBS

## 2023-11-15 PROBLEM — L57.0 ACTINIC KERATOSES: Status: ACTIVE | Noted: 2023-11-15

## 2023-11-15 ASSESSMENT — ENCOUNTER SYMPTOMS
FEVER: 0
TREMORS: 0
ENDOCRINE NEGATIVE: 1
BACK PAIN: 0
CHEST TIGHTNESS: 0
NUMBNESS: 0
CARDIOVASCULAR NEGATIVE: 1
GASTROINTESTINAL NEGATIVE: 1
RESPIRATORY NEGATIVE: 1
HEADACHES: 0
DYSPHORIC MOOD: 1
DIZZINESS: 0
ABDOMINAL DISTENTION: 0
UNEXPECTED WEIGHT CHANGE: 0
SHORTNESS OF BREATH: 0
LIGHT-HEADEDNESS: 0
CONFUSION: 0
WEAKNESS: 0
VOMITING: 0
HEMATURIA: 0
HALLUCINATIONS: 0
FATIGUE: 0
NERVOUS/ANXIOUS: 0
ARTHRALGIAS: 0
DIARRHEA: 0
NAUSEA: 0
SLEEP DISTURBANCE: 0
CONSTIPATION: 0
CONSTITUTIONAL NEGATIVE: 1
FREQUENCY: 1
COUGH: 0
ABDOMINAL PAIN: 0

## 2023-11-20 ENCOUNTER — OFFICE VISIT (OUTPATIENT)
Dept: NEPHROLOGY | Facility: CLINIC | Age: 88
End: 2023-11-20
Payer: MEDICARE

## 2023-11-20 VITALS
RESPIRATION RATE: 18 BRPM | SYSTOLIC BLOOD PRESSURE: 158 MMHG | DIASTOLIC BLOOD PRESSURE: 63 MMHG | HEART RATE: 66 BPM | TEMPERATURE: 97.1 F | BODY MASS INDEX: 31.67 KG/M2 | HEIGHT: 67 IN | WEIGHT: 201.8 LBS

## 2023-11-20 DIAGNOSIS — N18.32 STAGE 3B CHRONIC KIDNEY DISEASE (MULTI): Primary | ICD-10-CM

## 2023-11-20 PROCEDURE — 3077F SYST BP >= 140 MM HG: CPT | Performed by: INTERNAL MEDICINE

## 2023-11-20 PROCEDURE — 1160F RVW MEDS BY RX/DR IN RCRD: CPT | Performed by: INTERNAL MEDICINE

## 2023-11-20 PROCEDURE — 99214 OFFICE O/P EST MOD 30 MIN: CPT | Performed by: INTERNAL MEDICINE

## 2023-11-20 PROCEDURE — 3078F DIAST BP <80 MM HG: CPT | Performed by: INTERNAL MEDICINE

## 2023-11-20 PROCEDURE — 1126F AMNT PAIN NOTED NONE PRSNT: CPT | Performed by: INTERNAL MEDICINE

## 2023-11-20 PROCEDURE — 1159F MED LIST DOCD IN RCRD: CPT | Performed by: INTERNAL MEDICINE

## 2023-11-20 PROCEDURE — 1036F TOBACCO NON-USER: CPT | Performed by: INTERNAL MEDICINE

## 2023-11-20 NOTE — PATIENT INSTRUCTIONS
Continue Furosemide 60 mg per day  Monitor weight at home, if you gain more than 3lbs in a day or more than 5 lbs in a week, take an extra dose of lasix 20 mg in the afternoon  Low sodium diet 2.5 g/day sodium restricted diet

## 2023-11-22 NOTE — PROGRESS NOTES
"For follow up, doing well.  No complaints except leg swelling for which cardiology increased dose of Lasix to 60 mg daily- he is worried about the effect of this on kidney function  Has not noticed any improvement/change in leg swelling since increasing the dose  Has high salt diet-eats microwave dinners every 2-3 days  No hospitalizations/illness since last visit  Not checking BP at home  Denies orthostatic symptoms    RoS negative for all other systems except as noted above.    Visit Vitals  /63 (BP Location: Right arm, Patient Position: Sitting, BP Cuff Size: Adult)   Pulse 66   Temp 36.2 °C (97.1 °F) (Temporal)   Resp 18   Ht 1.702 m (5' 7\")   Wt 91.5 kg (201 lb 12.8 oz)   BMI 31.61 kg/m²   Smoking Status Never   BSA 2.08 m²      No distress  HEENT:  moist, no pallor  2+ edema olvin LE, compression socks  Breath sounds olvin equal, clear  S1 S2 regular, normal, no rub or murmur  Abdomen soft, non tender  AAO x3, non focal    Sodium   Date/Time Value Ref Range Status   10/20/2023 12:54  136 - 145 mmol/L Final   06/12/2023 02:09  136 - 145 mmol/L Final   01/04/2023 12:00  (L) 136 - 145 mmol/L Final   10/25/2022 01:30  (L) 136 - 145 mmol/L Final     Potassium   Date/Time Value Ref Range Status   10/20/2023 12:54 PM 4.9 3.5 - 5.3 mmol/L Final   06/12/2023 02:09 PM 4.8 3.5 - 5.3 mmol/L Final   01/04/2023 12:00 AM 4.9 3.5 - 5.3 mmol/L Final   10/25/2022 01:30 PM 4.3 3.5 - 5.3 mmol/L Final     Chloride   Date/Time Value Ref Range Status   10/20/2023 12:54 PM 98 98 - 107 mmol/L Final   06/12/2023 02:09 PM 99 98 - 107 mmol/L Final   01/04/2023 12:00 AM 99 98 - 107 mmol/L Final   10/25/2022 01:30 PM 94 (L) 98 - 107 mmol/L Final     Urea Nitrogen   Date/Time Value Ref Range Status   10/20/2023 12:54 PM 37 (H) 6 - 23 mg/dL Final   06/12/2023 02:09 PM 34 (H) 6 - 23 mg/dL Final   01/04/2023 12:00 AM 38 (H) 6 - 23 mg/dL Final   10/25/2022 01:30 PM 22 6 - 23 mg/dL Final     Creatinine   Date/Time Value " Ref Range Status   10/20/2023 12:54 PM 1.87 (H) 0.50 - 1.30 mg/dL Final   06/12/2023 02:09 PM 1.53 (H) 0.50 - 1.30 mg/dL Final   01/04/2023 12:00 AM 1.72 (H) 0.50 - 1.30 mg/dL Final   10/25/2022 01:30 PM 1.35 (H) 0.50 - 1.30 mg/dL Final     eGFR   Date/Time Value Ref Range Status   10/20/2023 12:54 PM 32 (L) >60 mL/min/1.73m*2 Final     Comment:     Calculations of estimated GFR are performed using the 2021 CKD-EPI Study Refit equation without the race variable for the IDMS-Traceable creatinine methods.  https://jasn.asnjournals.org/content/early/2021/09/22/ASN.2114805001     Calcium   Date/Time Value Ref Range Status   10/20/2023 12:54 PM 9.5 8.6 - 10.6 mg/dL Final   06/12/2023 02:09 PM 9.8 8.6 - 10.6 mg/dL Final   01/04/2023 12:00 AM 9.3 8.6 - 10.6 mg/dL Final   10/25/2022 01:30 PM 9.3 8.6 - 10.6 mg/dL Final     Phosphorus   Date/Time Value Ref Range Status   10/20/2023 12:54 PM 3.7 2.5 - 4.9 mg/dL Final     Comment:     The performance characteristics of phosphorus testing in heparinized plasma have been validated by the individual  laboratory site where testing is performed. Testing on heparinized plasma is not approved by the FDA; however, such approval is not necessary.   06/12/2023 02:09 PM 4.0 2.5 - 4.9 mg/dL Final     Comment:      The performance characteristics of phosphorus testing in   heparinized plasma have been validated by the individual     laboratory site where testing is performed. Testing    on heparinized plasma is not approved by the FDA;    however, such approval is not necessary.   06/09/2022 09:59 AM 3.7 2.5 - 4.9 mg/dL Final     Comment:      The performance characteristics of phosphorus testing in   heparinized plasma have been validated by the individual     laboratory site where testing is performed. Testing    on heparinized plasma is not approved by the FDA;    however, such approval is not necessary.     04/08/2022 02:09 PM 3.4 2.5 - 4.9 mg/dL Final     Comment:      The  performance characteristics of phosphorus testing in   heparinized plasma have been validated by the individual     laboratory site where testing is performed. Testing    on heparinized plasma is not approved by the FDA;    however, such approval is not necessary.       PTH   Date/Time Value Ref Range Status   04/08/2022 02:09 .1 (H) 18.5 - 88.0 pg/mL Final     Vitamin D, 25-Hydroxy   Date/Time Value Ref Range Status   06/09/2022 09:59 AM 44 ng/mL Final     Comment:     .  DEFICIENCY:         < 20   NG/ML  INSUFFICIENCY:      20-29  NG/ML  SUFFICIENCY:         NG/ML    THIS ASSAY ACCURATELY QUANTIFIES THE SUM OF  VITAMIN D3, 25-HYDROXY AND VIT D2,25-HYDROXY.       Hemoglobin   Date/Time Value Ref Range Status   10/20/2023 12:54 PM 11.7 (L) 13.5 - 17.5 g/dL Final   06/12/2023 02:09 PM 12.5 (L) 13.5 - 17.5 g/dL Final   01/04/2023 12:00 AM 11.1 (L) 13.5 - 17.5 g/dL Final   10/25/2022 01:30 PM 12.4 (L) 13.5 - 17.5 g/dL Final     89 -year-old male with medical history of CKD 3b (baseline serum creatinine 1.4 - 2.4 mg/dL), HTN, mild CM, HFrEF -> HFpEF (EF 50-55%, 4/7/21), pulm HTN, mild CAD, and HLD.     # CKD3b: Creatinine 1.8 mg/dl, eGFR 32 ml/min on 10/20/23, slightly worse, likely due to diuretic use. Discussed the balancing act with cardiorenal physiology and that avoidance of HF exacerbation takes precedence over maintaining creatinine stability.Ct Lasix 60 mg/day, leg elevation, compression sock use. Weigh daily and take extra dose of Lasix 20 mg in the afternoon incase of weight gain. He knows to avoid NSAIDs.     # HTN: On losartan 25 mg once a day, metoprolol succinate 100 mg  a day, spironolactone 25 mg once a day, furosemide 20 mg once a day. Blood pressure acceptable. Continue current medications without changes. 2.5 g/day sodium restricted diet discussed again, recommend decreasing consumption of packaged dinners     # LE edema, weight gain: as above      RTC: In 6 months

## 2023-12-11 ENCOUNTER — OFFICE VISIT (OUTPATIENT)
Dept: BEHAVIORAL HEALTH | Facility: CLINIC | Age: 88
End: 2023-12-11
Payer: MEDICARE

## 2023-12-11 DIAGNOSIS — F43.21 ADJUSTMENT DISORDER WITH DEPRESSED MOOD: ICD-10-CM

## 2023-12-11 DIAGNOSIS — G47.00 INSOMNIA, UNSPECIFIED TYPE: ICD-10-CM

## 2023-12-11 DIAGNOSIS — F41.9 ANXIETY DISORDER, UNSPECIFIED TYPE: ICD-10-CM

## 2023-12-11 PROCEDURE — 1126F AMNT PAIN NOTED NONE PRSNT: CPT | Performed by: PSYCHIATRY & NEUROLOGY

## 2023-12-11 PROCEDURE — 99214 OFFICE O/P EST MOD 30 MIN: CPT | Performed by: PSYCHIATRY & NEUROLOGY

## 2023-12-11 PROCEDURE — 1036F TOBACCO NON-USER: CPT | Performed by: PSYCHIATRY & NEUROLOGY

## 2023-12-11 PROCEDURE — 1159F MED LIST DOCD IN RCRD: CPT | Performed by: PSYCHIATRY & NEUROLOGY

## 2023-12-11 PROCEDURE — 1160F RVW MEDS BY RX/DR IN RCRD: CPT | Performed by: PSYCHIATRY & NEUROLOGY

## 2023-12-11 RX ORDER — MIRTAZAPINE 7.5 MG/1
7.5 TABLET, FILM COATED ORAL NIGHTLY
Qty: 90 TABLET | Refills: 0 | Status: SHIPPED | OUTPATIENT
Start: 2023-12-11 | End: 2024-03-11 | Stop reason: SDUPTHER

## 2023-12-11 NOTE — PROGRESS NOTES
Outpatient Psychiatry        Reason for Visit: follow up for depression, unspecified, remission ; insomnia ; bereavement, uncomplicated .    HPI: 89 y/o  male who presents for F/U regarding depression and insomnia. He reports he is feeling ok, still lonely related to loss of wife. Sees children once per week. His best friend recently passed away.   Stays active and socializes. He attends book club and play writing group. He acted in plays/musical  in the past.   He is falling asleep ok, but still awakens 12:30 am , 3 am.  Some nights can fall back to sleep after awakening. Estimates sleep is problematic about 5 times per month.   Goes out to dinner one to 2 times per week. attends book club monthly. conducts a grief session at [employer] weekly , goes to coffee get together weekly in his building.   Attends [employer].   no death wish or SI.   There have been no hallucinations.   He is taking mirtazapine, melatonin nightly and trazadone.   nocturia awakens him some nights .  USes a cane , gets tired when walking.   no new health issues. No falls     past psych h/o: previously following with Angelia Cuenca NP. Began to see her after inpatient admission [place]2017 for depression and SI. No other psych tx. He has been on celexa, seroquel, trazadone, stoppped cymbalta 30 mg 2018. Took ambien for 10 years until 2017.      chem dep: no substance abuse h/o, but has one drink per night (1.5 ounces of liquor or 8 oz of wine).      family psych h/o: mom- alcoholic, brother - alcohol abuse and his 2 kids drink heavily.      past med h/o: glaucoma, HTN, GERD, HLD, h/o prostate CA, cardiomyopathy, B/L TKA   all: sulfa, PCN     social h/o: raised by mom and step-dad. Finished hs and straight to college - [employer]. Worked in , retired .  X2 . Currently  43 years. He has 3 bio sons- one son  from substance abuse in . One step daughter. All living children are in the area and they have close  relationships. He sees his grandchildren at family gatherings. He loves sports and curls. . He enjoys activities. 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 Played football along with other sports.          Current Medications:    Current Outpatient Medications:     aspirin 81 mg EC tablet, Take 1 tablet (81 mg) by mouth once daily., Disp: , Rfl:     calcium carbonate-vitamin D3 600 mg-20 mcg (800 unit) tablet, Take 1 tablet by mouth once daily., Disp: , Rfl:     escitalopram (Lexapro) 10 mg tablet, Take 1 tablet (10 mg) by mouth once daily., Disp: , Rfl:     furosemide (Lasix) 20 mg tablet, Take 3 tablets (60 mg) by mouth once daily., Disp: 270 tablet, Rfl: 1    isosorbide mononitrate ER (Imdur) 120 mg 24 hr tablet, Take by mouth., Disp: , Rfl:     losartan (Cozaar) 25 mg tablet, TAKE 1 TABLET BY MOUTH DAILY, Disp: 90 tablet, Rfl: 3    melatonin 10 mg capsule, Take 1 capsule (10 mg) by mouth once daily at bedtime., Disp: , Rfl:     metoprolol succinate XL (Toprol-XL) 100 mg 24 hr tablet, Take 1 tablet (100 mg) by mouth once daily., Disp: , Rfl:     metoprolol succinate XL (Toprol-XL) 50 mg 24 hr tablet, Take 1 tablet (50 mg) by mouth once daily., Disp: , Rfl:     mirtazapine (Remeron) 7.5 mg tablet, Take by mouth., Disp: , Rfl:     multivit-iron-minerals-folic acid (Centrum Silver) 0.4 mg-300 mcg- 250 mcg tab, Take 1 tablet by mouth once daily., Disp: , Rfl:     nepafenac (Nevanac) 0.1 % ophthalmic suspension, Administer 1 drop into affected eye(s) 3 times a day., Disp: , Rfl:     omeprazole (PriLOSEC) 20 mg DR capsule, Take 1 capsule (20 mg) by mouth once daily in the morning. Take before meals., Disp: , Rfl:     simvastatin (Zocor) 20 mg tablet, Take 1 tablet (20 mg) by mouth once daily at bedtime., Disp: , Rfl:     spironolactone (Aldactone) 25 mg tablet, Take 1 tablet (25 mg) by mouth once daily., Disp: , Rfl:     timolol (Timoptic) 0.5 % ophthalmic solution, Administer into affected eye(s) every 12 hours.,  Disp: , Rfl:     traZODone (Desyrel) 50 mg tablet, Take by mouth., Disp: , Rfl:   Medical History:  Past Medical History:   Diagnosis Date    Acquired trigger finger 06/06/2023    Acute bronchitis 06/06/2023    Acute depression 06/06/2023    Anxiety in acute stress reaction 06/06/2023    Bacterial pneumonia 06/06/2023    Blepharitis 06/06/2023    Chronic otitis media of left ear 06/06/2023    Conjunctivitis 06/06/2023    Cough 06/06/2023    Disease due to severe acute respiratory syndrome coronavirus 2 (SARS-CoV-2) 06/06/2023    Dysphonia 05/15/2019    Dysphonia    Encounter for debridement of left postmastoidectomy cavity 06/06/2023    Exposure to COVID-19 virus 06/06/2023    Fever of unknown origin 06/06/2023    GERD (gastroesophageal reflux disease) 06/20/2023    Heart failure with preserved left ventricular function (HFpEF) (CMS/[facility]) 06/06/2023    Hip pain, left 06/06/2023    Hyperlipidemia 06/20/2023    Impacted cerumen of left ear 06/06/2023    Lightheadedness 06/06/2023    Middle ear infection, chronic 06/06/2023    Osteoarthritis of knee, unspecified 05/16/2016    Osteoarthritis of knee    Other conditions influencing health status     Prostate Cancer    Other conditions influencing health status     Colonoscopy (Fiberoptic)    Other specified postprocedural states 11/03/2018    Status post cardiac catheterization    Personal history of other diseases of the circulatory system 02/15/2022    History of cardiomyopathy    Personal history of other diseases of the circulatory system     History of mitral valve disorder    Personal history of other diseases of the circulatory system     History of hypertension    Personal history of other diseases of the digestive system     History of esophageal reflux    Personal history of other endocrine, nutritional and metabolic disease     History of hyperlipidemia    Senile ectropion of left lower eyelid 03/07/2018    Formatting of this note might be different from the  original. Added automatically from request for surgery [id number]    Severe single current episode of major depressive disorder, with psychotic features (CMS/[facility]) 06/06/2023    Spontaneous rupture of extensor tendons, left hand 12/15/2017    Nontraumatic rupture of sagittal band of extensor tendon of left upper extremity    Spontaneous rupture of extensor tendons, right hand 11/17/2017    Nontraumatic rupture of sagittal band of extensor tendon of right upper extremity    Unspecified systolic (congestive) heart failure (CMS/[facility]) 09/26/2022    HFrEF (heart failure with reduced ejection fraction)     Surgical History:  Past Surgical History:   Procedure Laterality Date    ANKLE ARTHROSCOPY W/ OPEN REPAIR  10/23/2013    Ankle Repair    APPENDECTOMY  10/23/2013    Appendectomy    CATARACT EXTRACTION  10/23/2013    Cataract Surgery    OTHER SURGICAL HISTORY  10/23/2013    Mastoidectomy    TOTAL KNEE ARTHROPLASTY  02/04/2014    Total Knee Arthroplasty     Family History:  Family History   Problem Relation Name Age of Onset    Alcohol abuse Mother      No Known Problems Father      Heart disease Brother          Age 53    Other (Age 95) Brother      No Known Problems Daughter      No Known Problems Son      No Known Problems Son      Other (Drug overdose) Son       Social History:  Social History     Socioeconomic History    Marital status:      Spouse name: Not on file    Number of children: Not on file    Years of education: Not on file    Highest education level: Not on file   Occupational History    Not on file   Tobacco Use    Smoking status: Never     Passive exposure: Never    Smokeless tobacco: Never   Substance and Sexual Activity    Alcohol use: Yes     Comment: 3/week    Drug use: Never    Sexual activity: Not on file   Other Topics Concern    Not on file   Social History Narrative    Not on file     Social Determinants of Health     Financial Resource Strain: Not on file   Food Insecurity: Not on file  "  Transportation Needs: Not on file   Physical Activity: Not on file   Stress: Not on file   Social Connections: Not on file   Intimate Partner Violence: Not on file   Housing Stability: Not on file       Medical Review Of Systems:  Pertinent items are noted in HPI.    Psychiatric Review Of Systems:  Psychiatric ROS - Adult  Anxiety: Negative  Depression: sad at times due to loss of wife  Delirium: negative  Psychosis: negative  Jenn: negative  Safety Issues: none    Disordered Eating Symptoms:n/a  Inattentive Symptoms:n/a   Hyperactive/Impulsive Symptoms:n/a    Trauma Symptoms:n/a       There were no vitals taken for this visit.     LABS  Lab Results   Component Value Date    TSH 3.73 06/12/2023    25 60.2 04/08/2022    [id number] 417 10/25/2022    HDL 46.4 06/12/2023      Lab Results   Component Value Date    CHOL 145 06/12/2023    CHOL 145 06/09/2022     Lab Results   Component Value Date    HDL 46.4 06/12/2023    HDL 45.6 06/09/2022     No results found for: \"LDLCALC\"  Lab Results   Component Value Date    TRIG 138 06/12/2023    TRIG 129 06/09/2022       Chemistry    Lab Results   Component Value Date/Time     10/20/2023 1254    K 4.9 10/20/2023 1254    CL 98 10/20/2023 1254    CO2 29 10/20/2023 1254    BUN 37 (H) 10/20/2023 1254    CREATININE 1.87 (H) 10/20/2023 1254    Lab Results   Component Value Date/Time    CALCIUM 9.5 10/20/2023 1254    ALKPHOS 62 01/04/2023 0000    AST 18 06/12/2023 1409    ALT 14 06/12/2023 1409    BILITOT 0.6 01/04/2023 0000          No components found for: \"CHOLHDL\"     Objective   Mental Status Exam:  Mental Status Examination  General Appearance: Well groomed, appropriate eye contact.  Gait/Station: Within normal limits  Speech: uses cane for stability   Mood: ok  Affect: Euthymic, full-range  Thought Process: Linear, goal directed  Thought Associations: No loosening of associations  Thought Content: normal  Perception: No perceptual abnormalities noted  Level of " Consciousness: Alert  Orientation: Alert and oriented to person, place, time and situation  Attention and Concentration: Intact  Recent Memory: Intact as evidenced by ability to recall details from the past 24 hours   Remote Memory: Intact as evidenced by ability to recall previous medical issues   Executive function: Intact  Language: Naming intact  Fund of knowledge: Good  Insight: intact  Judgment: intact         Assessment/Plan   This 89 y/o male has a h/o of one depressive episode (jan 2017) in the setting of low Na and stress. He has not had previous or subsequent significant mood struggles- currently appropriate bereavement regarding loss of wife. He was maintained on Seroquel (which was eventually stopped) and trazodone to address insomnia (sleep has been a chronic difficulty and he had used ambien for about 10 years prior to depressive episode in 2017). He is now taking remeron, trazadone, melatonin . Sleep is pretty good most nights, but there are some episodes of middle insomnia. . No hypnopompic hallucinations.  There are no acute safety issues such as SI/HI.       dx: depression, unspecified, remission ; insomnia ; bereavement, uncomplicated      1) continue mirtazapine 7.5 mg at night for sleep, . trazadone 25-50 mg at night for sleep improvement, melatonin used as needed,  taking balance of nature vitamins  2) f/u 3 months or earlier if needed   3) labs reviewed  4) safety plan reviewed - if there are thoughts of self harm, concerning side effects or significant symptoms worsening report to the ER, call [phone].          Review with patient: Treatment plan reviewed with the patient.  Medication risks/benefit reviewed with the patient  Psychiatric Risk Assessment  Violence Risk Assessment: male  Acute Risk of Harm to Others is Considered: low   Suicide Risk Assessment: age > 65 yrs old  Protective Factors against Suicide: adherence to  treatment, hopefulness/future orientation, moral objections to  suicide, positive family relationships, [employer] affiliation/spirituality, sense of responsibility toward family, social support/connectedness, and strong coping skills  Acute Risk of Harm to Self is Considered: low        Bee Hoyt, DO

## 2023-12-18 ENCOUNTER — OFFICE VISIT (OUTPATIENT)
Dept: CARDIOLOGY | Facility: CLINIC | Age: 88
End: 2023-12-18
Payer: MEDICARE

## 2023-12-18 VITALS
OXYGEN SATURATION: 96 % | HEIGHT: 68 IN | BODY MASS INDEX: 31.07 KG/M2 | WEIGHT: 205 LBS | SYSTOLIC BLOOD PRESSURE: 129 MMHG | DIASTOLIC BLOOD PRESSURE: 69 MMHG | HEART RATE: 64 BPM

## 2023-12-18 DIAGNOSIS — I10 HYPERTENSION, UNSPECIFIED TYPE: ICD-10-CM

## 2023-12-18 DIAGNOSIS — I27.20 PULMONARY HYPERTENSION (MULTI): ICD-10-CM

## 2023-12-18 DIAGNOSIS — R93.1 ELEVATED CORONARY ARTERY CALCIUM SCORE: ICD-10-CM

## 2023-12-18 DIAGNOSIS — I50.9 CONGESTIVE HEART FAILURE, UNSPECIFIED HF CHRONICITY, UNSPECIFIED HEART FAILURE TYPE (MULTI): Primary | ICD-10-CM

## 2023-12-18 DIAGNOSIS — R06.09 DYSPNEA ON EXERTION: ICD-10-CM

## 2023-12-18 DIAGNOSIS — I25.10 MILD CAD: ICD-10-CM

## 2023-12-18 PROCEDURE — 99214 OFFICE O/P EST MOD 30 MIN: CPT | Performed by: INTERNAL MEDICINE

## 2023-12-18 PROCEDURE — 3074F SYST BP LT 130 MM HG: CPT | Performed by: INTERNAL MEDICINE

## 2023-12-18 PROCEDURE — 1126F AMNT PAIN NOTED NONE PRSNT: CPT | Performed by: INTERNAL MEDICINE

## 2023-12-18 PROCEDURE — 1036F TOBACCO NON-USER: CPT | Performed by: INTERNAL MEDICINE

## 2023-12-18 PROCEDURE — 1159F MED LIST DOCD IN RCRD: CPT | Performed by: INTERNAL MEDICINE

## 2023-12-18 PROCEDURE — 3078F DIAST BP <80 MM HG: CPT | Performed by: INTERNAL MEDICINE

## 2023-12-18 PROCEDURE — 1160F RVW MEDS BY RX/DR IN RCRD: CPT | Performed by: INTERNAL MEDICINE

## 2023-12-18 RX ORDER — FUROSEMIDE 20 MG/1
80 TABLET ORAL DAILY
Qty: 360 TABLET | Refills: 3 | Status: SHIPPED | OUTPATIENT
Start: 2023-12-18 | End: 2024-04-05 | Stop reason: SDUPTHER

## 2023-12-18 RX ORDER — TRAZODONE HYDROCHLORIDE 50 MG/1
TABLET ORAL
Qty: 90 TABLET | Refills: 3 | OUTPATIENT
Start: 2023-12-18

## 2023-12-18 ASSESSMENT — ENCOUNTER SYMPTOMS
CONSTIPATION: 0
ABDOMINAL PAIN: 0
FALLS: 0
CHILLS: 0
ALTERED MENTAL STATUS: 0
WHEEZING: 0
FEVER: 0
HEMOPTYSIS: 0
DYSURIA: 0
MYALGIAS: 0
BLOATING: 0
MEMORY LOSS: 0
DEPRESSION: 0
COUGH: 0
HEADACHES: 0
DIARRHEA: 0
NAUSEA: 0
VOMITING: 0
HEMATURIA: 0

## 2023-12-18 ASSESSMENT — PAIN SCALES - GENERAL: PAINLEVEL: 0-NO PAIN

## 2023-12-18 NOTE — PROGRESS NOTES
Chief complaint:  FU     HPI  89 yo WM w/ h/o mild CM, HFrEF -> HFpEF, pulm HTN, mild CAD, elevated CCS, HTN, HPL, CKD, FHx CAD now here for cardiology f/u. No chest pain. No sig dyspnea at rest. +ch HANKINS (worst after bending over), prev no sig improvement on increased diuretics. +occ mild orthopnea (more when 1st getting into bed and resolves after seconds). No PND. No palps. +occ exertional/positional LH/dizziness. No syncope. +ch dependent LE edema. No claudication. +occ cough (no change on allergy med or PPI). +occ fatigue. +occ snoring (when on back). +occ B arm weakness x 30-60 seconds. Wgt remains up.  BP at home (occ checked): ~130/60, HR 60s  Wgt at home: ~195-198 lbs (base ~185 lbs)  ECG 6/17: SR (70), 1st deg AVB, LAFB  ECG 12/17: SR (81), LAFB, LVH w/ ST changes  ECG 12/18: SR (64), 1st deg AVB, LAFB, LVH  ECG 1/20: SB (58), 1st deg AVB, LBBB  ECG 6/22: SB (57), IVCD  ECG 1/23: SR (64), IVCD, PVCs/bigeminy  Echo 11/17: EF 35-40%, ecc LVH, DD, LAE, mild MR, mild AI  Echo 4/21: EF 50-55%, DD, mod LAE, valves ok  CCS 12/17: 744 (LM 0, , LCx 0, ), small R pleural effusion, sm-mod HH  Cath 5/18: pLAD 30%, pRCA 10-30%, LVEDP 22-27, PCW 22, PA 60/26/41, CO 5.3, CI 2.6, no shunt, no AS  CXR 1/17: no acute abnl  CXR 3/19: HH, subseg atelectasis at LL base, otherwise lungs clear  CT brain 6/17: no acute abnl     Review of Systems   Constitutional: Negative for chills, fever and malaise/fatigue.   HENT:  Negative for hearing loss.    Eyes:  Negative for visual disturbance.   Respiratory:  Negative for cough, hemoptysis and wheezing.    Skin:  Negative for rash.   Musculoskeletal:  Negative for falls and myalgias.   Gastrointestinal:  Negative for bloating, abdominal pain, constipation, diarrhea, dysphagia, nausea and vomiting.   Genitourinary:  Negative for dysuria and hematuria.   Neurological:  Negative for headaches.   Psychiatric/Behavioral:  Negative for altered mental status, depression and  memory loss.         Social History     Tobacco Use    Smoking status: Never     Passive exposure: Never    Smokeless tobacco: Never   Substance Use Topics    Alcohol use: Yes     Comment: 3/week        Family History   Problem Relation Name Age of Onset    Alcohol abuse Mother      No Known Problems Father      Heart disease Brother          Age 53    Other (Age 95) Brother      No Known Problems Daughter      No Known Problems Son      No Known Problems Son      Other (Drug overdose) Son          Allergies   Allergen Reactions    Penicillins Hives and Swelling    Sulfa (Sulfonamide Antibiotics) Hives and Swelling        Current Outpatient Medications   Medication Instructions    aspirin 81 mg EC tablet 1 tablet, oral, Daily    calcium carbonate-vitamin D3 600 mg-20 mcg (800 unit) tablet 1 tablet, oral, Daily    escitalopram (Lexapro) 10 mg tablet 1 tablet, oral, Daily    furosemide (LASIX) 60 mg, oral, Daily    isosorbide mononitrate ER (Imdur) 120 mg 24 hr tablet oral    losartan (COZAAR) 25 mg, oral, Daily    melatonin 10 mg capsule 1 capsule, oral, Nightly    metoprolol succinate XL (TOPROL-XL) 50 mg, oral, Daily RT    metoprolol succinate XL (TOPROL-XL) 100 mg, oral, Daily    mirtazapine (REMERON) 7.5 mg, oral, Nightly    multivit-iron-minerals-folic acid (Centrum Silver) 0.4 mg-300 mcg- 250 mcg tab 1 tablet, oral, Daily    nepafenac (Nevanac) 0.1 % ophthalmic suspension 1 drop, ophthalmic (eye), 3 times daily    omeprazole (PRILOSEC) 20 mg, oral, Daily before breakfast    simvastatin (Zocor) 20 mg tablet 1 tablet, oral, Nightly    spironolactone (Aldactone) 25 mg tablet 1 tablet, oral, Daily    timolol (Timoptic) 0.5 % ophthalmic solution ophthalmic (eye), Every 12 hours    traZODone (Desyrel) 50 mg tablet oral        Vitals:    12/18/23 1315   BP: 129/69   Pulse: 64   SpO2: 96%        Physical Exam  Constitutional:       Appearance: Normal appearance.   HENT:      Head: Normocephalic and atraumatic.       Nose: Nose normal.   Cardiovascular:      Rate and Rhythm: Normal rate and regular rhythm.      Heart sounds: No murmur heard.  Pulmonary:      Effort: Pulmonary effort is normal.      Breath sounds: Normal breath sounds.   Abdominal:      Palpations: Abdomen is soft.      Tenderness: There is no abdominal tenderness.   Musculoskeletal:      Right lower leg: Edema present.      Left lower leg: Edema present.      Comments: 1+ pitting LE edema   Skin:     General: Skin is warm and dry.   Neurological:      General: No focal deficit present.      Mental Status: He is alert.   Psychiatric:         Mood and Affect: Mood normal.         Judgment: Judgment normal.          Lab Results   Component Value Date    CR 1.87      HGB 11.7      K 4.9      MG      BNP            LDL      TSH       Assessment/Plan   89 yo WM w/ h/o mild CM, HFrEF -> HFpEF, pulm HTN, mild CAD, elevated CCS, HTN, HPL, CKD, FHx CAD now with continued acute on chronic decompensated CHF. Will retry increasing Lasix more. Continue check home weights and bring log to appts.  CM essentially resolved on BB/ARB - EF now low normal to very mild reduced at 50-55%.  L/R press elevated. Pulm HTN most likely sec to CHF. Consider PFTs (earlene if more dyspnea/wheezing). Okay to remain conservative in this elderly patient - try to minimize symptoms.  -continue ASA 81 qd  -continue Metoprolol Succinate 100 bid  -continue Losartan 25 qd   -continue Imdur 60 qd   -continue Spironolactone 25 qd  -increase Furosemide from 60 to 80 qd (kidney prev irritated on Metolazone)  -continue Simva 20 qhs  -f/u 1-2 months (earlier if needed)     Mike Lao MD

## 2023-12-29 ENCOUNTER — APPOINTMENT (OUTPATIENT)
Dept: CARDIOLOGY | Facility: CLINIC | Age: 88
End: 2023-12-29
Payer: MEDICARE

## 2024-01-11 ENCOUNTER — TELEPHONE (OUTPATIENT)
Dept: PRIMARY CARE | Facility: CLINIC | Age: 89
End: 2024-01-11
Payer: COMMERCIAL

## 2024-01-11 DIAGNOSIS — N18.31 STAGE 3A CHRONIC KIDNEY DISEASE (MULTI): ICD-10-CM

## 2024-01-11 DIAGNOSIS — I10 PRIMARY HYPERTENSION: ICD-10-CM

## 2024-01-11 DIAGNOSIS — C61 ADENOCARCINOMA OF PROSTATE (MULTI): Primary | ICD-10-CM

## 2024-01-11 DIAGNOSIS — K22.70 BARRETT'S ESOPHAGUS WITHOUT DYSPLASIA: ICD-10-CM

## 2024-01-11 DIAGNOSIS — R60.0 LOCALIZED EDEMA: ICD-10-CM

## 2024-01-11 DIAGNOSIS — M89.9 BONE DISORDER: ICD-10-CM

## 2024-01-15 ENCOUNTER — LAB (OUTPATIENT)
Dept: LAB | Facility: LAB | Age: 89
End: 2024-01-15
Payer: MEDICARE

## 2024-01-15 DIAGNOSIS — C61 ADENOCARCINOMA OF PROSTATE (MULTI): ICD-10-CM

## 2024-01-15 DIAGNOSIS — M89.9 BONE DISORDER: ICD-10-CM

## 2024-01-15 DIAGNOSIS — N18.32 STAGE 3B CHRONIC KIDNEY DISEASE (MULTI): ICD-10-CM

## 2024-01-15 DIAGNOSIS — I10 PRIMARY HYPERTENSION: ICD-10-CM

## 2024-01-15 LAB
25(OH)D3 SERPL-MCNC: 46 NG/ML (ref 30–100)
ALBUMIN SERPL BCP-MCNC: 4 G/DL (ref 3.4–5)
ALT SERPL W P-5'-P-CCNC: 12 U/L (ref 10–52)
ANION GAP SERPL CALC-SCNC: 14 MMOL/L (ref 10–20)
AST SERPL W P-5'-P-CCNC: 14 U/L (ref 9–39)
BASOPHILS # BLD AUTO: 0.05 X10*3/UL (ref 0–0.1)
BASOPHILS NFR BLD AUTO: 0.6 %
BUN SERPL-MCNC: 49 MG/DL (ref 6–23)
CALCIUM SERPL-MCNC: 9.5 MG/DL (ref 8.6–10.6)
CHLORIDE SERPL-SCNC: 97 MMOL/L (ref 98–107)
CHOLEST SERPL-MCNC: 137 MG/DL (ref 0–199)
CHOLESTEROL/HDL RATIO: 3.2
CO2 SERPL-SCNC: 32 MMOL/L (ref 21–32)
CREAT SERPL-MCNC: 2.22 MG/DL (ref 0.5–1.3)
EGFRCR SERPLBLD CKD-EPI 2021: 26 ML/MIN/1.73M*2
EOSINOPHIL # BLD AUTO: 0.41 X10*3/UL (ref 0–0.4)
EOSINOPHIL NFR BLD AUTO: 4.9 %
ERYTHROCYTE [DISTWIDTH] IN BLOOD BY AUTOMATED COUNT: 13.1 % (ref 11.5–14.5)
GLUCOSE SERPL-MCNC: 73 MG/DL (ref 74–99)
HCT VFR BLD AUTO: 37.3 % (ref 41–52)
HDLC SERPL-MCNC: 43 MG/DL
HGB BLD-MCNC: 11.8 G/DL (ref 13.5–17.5)
IMM GRANULOCYTES # BLD AUTO: 0.04 X10*3/UL (ref 0–0.5)
IMM GRANULOCYTES NFR BLD AUTO: 0.5 % (ref 0–0.9)
LDLC SERPL CALC-MCNC: 58 MG/DL
LYMPHOCYTES # BLD AUTO: 1.37 X10*3/UL (ref 0.8–3)
LYMPHOCYTES NFR BLD AUTO: 16.3 %
MCH RBC QN AUTO: 30.2 PG (ref 26–34)
MCHC RBC AUTO-ENTMCNC: 31.6 G/DL (ref 32–36)
MCV RBC AUTO: 95 FL (ref 80–100)
MONOCYTES # BLD AUTO: 0.84 X10*3/UL (ref 0.05–0.8)
MONOCYTES NFR BLD AUTO: 10 %
NEUTROPHILS # BLD AUTO: 5.72 X10*3/UL (ref 1.6–5.5)
NEUTROPHILS NFR BLD AUTO: 67.7 %
NON HDL CHOLESTEROL: 94 MG/DL (ref 0–149)
NRBC BLD-RTO: 0 /100 WBCS (ref 0–0)
PHOSPHATE SERPL-MCNC: 3.9 MG/DL (ref 2.5–4.9)
PLATELET # BLD AUTO: 203 X10*3/UL (ref 150–450)
POTASSIUM SERPL-SCNC: 4.9 MMOL/L (ref 3.5–5.3)
PSA SERPL-MCNC: 3.56 NG/ML
PTH-INTACT SERPL-MCNC: 143.9 PG/ML (ref 18.5–88)
RBC # BLD AUTO: 3.91 X10*6/UL (ref 4.5–5.9)
SODIUM SERPL-SCNC: 138 MMOL/L (ref 136–145)
TRIGL SERPL-MCNC: 181 MG/DL (ref 0–149)
TSH SERPL-ACNC: 3.92 MIU/L (ref 0.44–3.98)
VLDL: 36 MG/DL (ref 0–40)
WBC # BLD AUTO: 8.4 X10*3/UL (ref 4.4–11.3)

## 2024-01-15 PROCEDURE — 80061 LIPID PANEL: CPT

## 2024-01-15 PROCEDURE — 84443 ASSAY THYROID STIM HORMONE: CPT

## 2024-01-15 PROCEDURE — 80069 RENAL FUNCTION PANEL: CPT

## 2024-01-15 PROCEDURE — 83970 ASSAY OF PARATHORMONE: CPT

## 2024-01-15 PROCEDURE — 36415 COLL VENOUS BLD VENIPUNCTURE: CPT

## 2024-01-15 PROCEDURE — 82306 VITAMIN D 25 HYDROXY: CPT

## 2024-01-15 PROCEDURE — 85025 COMPLETE CBC W/AUTO DIFF WBC: CPT

## 2024-01-15 PROCEDURE — 82043 UR ALBUMIN QUANTITATIVE: CPT

## 2024-01-15 PROCEDURE — 84450 TRANSFERASE (AST) (SGOT): CPT

## 2024-01-15 PROCEDURE — 82570 ASSAY OF URINE CREATININE: CPT

## 2024-01-15 PROCEDURE — 84153 ASSAY OF PSA TOTAL: CPT

## 2024-01-15 PROCEDURE — 84460 ALANINE AMINO (ALT) (SGPT): CPT

## 2024-01-15 PROCEDURE — 81003 URINALYSIS AUTO W/O SCOPE: CPT

## 2024-01-16 LAB
APPEARANCE UR: CLEAR
BILIRUB UR STRIP.AUTO-MCNC: NEGATIVE MG/DL
COLOR UR: YELLOW
CREAT UR-MCNC: 106.6 MG/DL (ref 20–370)
CREAT UR-MCNC: 107.2 MG/DL (ref 20–370)
GLUCOSE UR STRIP.AUTO-MCNC: NEGATIVE MG/DL
KETONES UR STRIP.AUTO-MCNC: NEGATIVE MG/DL
LEUKOCYTE ESTERASE UR QL STRIP.AUTO: NEGATIVE
MICROALBUMIN UR-MCNC: <7 MG/L
MICROALBUMIN/CREAT UR: NORMAL MG/G{CREAT}
NITRITE UR QL STRIP.AUTO: NEGATIVE
PH UR STRIP.AUTO: 5 [PH]
PROT UR STRIP.AUTO-MCNC: NEGATIVE MG/DL
RBC # UR STRIP.AUTO: NEGATIVE /UL
SP GR UR STRIP.AUTO: 1.01
UROBILINOGEN UR STRIP.AUTO-MCNC: <2 MG/DL

## 2024-01-22 ENCOUNTER — OFFICE VISIT (OUTPATIENT)
Dept: PRIMARY CARE | Facility: CLINIC | Age: 89
End: 2024-01-22
Payer: COMMERCIAL

## 2024-01-22 DIAGNOSIS — R60.0 LOCALIZED EDEMA: Primary | ICD-10-CM

## 2024-01-22 DIAGNOSIS — M75.52 CHRONIC SHOULDER BURSITIS, LEFT: ICD-10-CM

## 2024-01-22 DIAGNOSIS — N18.4 CKD (CHRONIC KIDNEY DISEASE), STAGE IV (MULTI): ICD-10-CM

## 2024-01-22 PROCEDURE — 3074F SYST BP LT 130 MM HG: CPT | Performed by: INTERNAL MEDICINE

## 2024-01-22 PROCEDURE — 1036F TOBACCO NON-USER: CPT | Performed by: INTERNAL MEDICINE

## 2024-01-22 PROCEDURE — 1126F AMNT PAIN NOTED NONE PRSNT: CPT | Performed by: INTERNAL MEDICINE

## 2024-01-22 PROCEDURE — 3078F DIAST BP <80 MM HG: CPT | Performed by: INTERNAL MEDICINE

## 2024-01-22 PROCEDURE — 99214 OFFICE O/P EST MOD 30 MIN: CPT | Performed by: INTERNAL MEDICINE

## 2024-01-22 RX ORDER — BUMETANIDE 1 MG/1
1 TABLET ORAL DAILY
Qty: 90 TABLET | Refills: 0 | Status: SHIPPED | OUTPATIENT
Start: 2024-01-22 | End: 2024-02-19 | Stop reason: ALTCHOICE

## 2024-01-22 NOTE — PROGRESS NOTES
"Subjective   Patient ID: Jf Watts \"TORIE\" is a 89 y.o. male who presents for Follow-up.  HPI  L shoulder bursitis   Water pills still weight 197-200#   Urine Ok slow flow     Review of Systems   Constitutional:  Negative for fever and unexpected weight change.   Respiratory:  Negative for cough, shortness of breath and wheezing.    Cardiovascular:  Positive for leg swelling. Negative for chest pain and palpitations.   Gastrointestinal:  Negative for abdominal distention.   Genitourinary:  Negative for difficulty urinating.   Musculoskeletal:         Left shoulder tenderness on external rotation and abduction   Neurological:  Negative for headaches.       Objective   Physical Exam  Constitutional:       Appearance: Normal appearance.   Cardiovascular:      Rate and Rhythm: Normal rate and regular rhythm.      Heart sounds: Normal heart sounds.   Pulmonary:      Effort: Pulmonary effort is normal.      Breath sounds: Normal breath sounds.   Abdominal:      General: Abdomen is flat. Bowel sounds are normal.      Palpations: Abdomen is soft.   Musculoskeletal:      Right lower leg: Edema present.      Left lower leg: Edema present.      Comments: Compression stockings, nontender   Skin:     Findings: Lesion present.      Comments: Multiple keratosis and nevi   Neurological:      General: No focal deficit present.      Mental Status: He is alert.      Gait: Gait abnormal.      Comments: Ambulatory with cane       /70   Pulse 72   Resp 16   Wt 91.2 kg (201 lb)   BMI 30.56 kg/m²     Data  1/15/2024 glucose 73 , creatinine 2.2, GFR 26  Parathyroid 144 calcium 9.9  Interval consults reviewed:  Ophthalmology consult 1/8/2024  Cardiology consult 12/18/2023 increase furosemide from 60-80 daily  Psychology 12/11/2023  Nephrology 11/20/2023  GI consult 11/1/2023  ENT consult 10/25/2023    Assessment/Plan     Peripheral edema, increased weight, I chronic kidney disease  Stop furosemide lasix  Start bumex 1 mg " daily   Check blood test and followup in 2 weeks   Shoulder bursitis-orthopedic consult    Problem List Items Addressed This Visit       Edema    Relevant Medications    bumetanide (Bumex) 1 mg tablet    Other Relevant Orders    Renal Function Panel (Completed)     Other Visit Diagnoses       Chronic shoulder bursitis, left    -  Primary    Relevant Orders    Referral to Orthopaedic Surgery    CKD (chronic kidney disease), stage IV (CMS/[facility])

## 2024-01-23 ENCOUNTER — TELEPHONE (OUTPATIENT)
Dept: PRIMARY CARE | Facility: CLINIC | Age: 89
End: 2024-01-23
Payer: COMMERCIAL

## 2024-01-23 NOTE — TELEPHONE ENCOUNTER
[facility]: called about patients new prescription,      bumetanide 1 mg oral Daily      He is allergic to Sulfa meds and wanted to know if its still ok to fill this?

## 2024-02-09 ENCOUNTER — LAB (OUTPATIENT)
Dept: LAB | Facility: LAB | Age: 89
End: 2024-02-09
Payer: COMMERCIAL

## 2024-02-09 DIAGNOSIS — R60.0 LOCALIZED EDEMA: ICD-10-CM

## 2024-02-09 LAB
ALBUMIN SERPL BCP-MCNC: 3.9 G/DL (ref 3.4–5)
ANION GAP SERPL CALC-SCNC: 15 MMOL/L (ref 10–20)
BUN SERPL-MCNC: 46 MG/DL (ref 6–23)
CALCIUM SERPL-MCNC: 9.9 MG/DL (ref 8.6–10.6)
CHLORIDE SERPL-SCNC: 100 MMOL/L (ref 98–107)
CO2 SERPL-SCNC: 27 MMOL/L (ref 21–32)
CREAT SERPL-MCNC: 2.14 MG/DL (ref 0.5–1.3)
EGFRCR SERPLBLD CKD-EPI 2021: 28 ML/MIN/1.73M*2
GLUCOSE SERPL-MCNC: 110 MG/DL (ref 74–99)
PHOSPHATE SERPL-MCNC: 3.8 MG/DL (ref 2.5–4.9)
POTASSIUM SERPL-SCNC: 4.9 MMOL/L (ref 3.5–5.3)
SODIUM SERPL-SCNC: 137 MMOL/L (ref 136–145)

## 2024-02-09 PROCEDURE — 36415 COLL VENOUS BLD VENIPUNCTURE: CPT

## 2024-02-09 PROCEDURE — 80069 RENAL FUNCTION PANEL: CPT

## 2024-02-13 ENCOUNTER — OFFICE VISIT (OUTPATIENT)
Dept: PRIMARY CARE | Facility: CLINIC | Age: 89
End: 2024-02-13
Payer: COMMERCIAL

## 2024-02-13 VITALS
DIASTOLIC BLOOD PRESSURE: 70 MMHG | RESPIRATION RATE: 16 BRPM | BODY MASS INDEX: 30.56 KG/M2 | SYSTOLIC BLOOD PRESSURE: 120 MMHG | HEART RATE: 72 BPM | WEIGHT: 201 LBS

## 2024-02-13 DIAGNOSIS — N18.4 CKD (CHRONIC KIDNEY DISEASE), STAGE IV (MULTI): ICD-10-CM

## 2024-02-13 DIAGNOSIS — I10 PRIMARY HYPERTENSION: ICD-10-CM

## 2024-02-13 DIAGNOSIS — R60.0 LOCALIZED EDEMA: Primary | ICD-10-CM

## 2024-02-13 DIAGNOSIS — C61 ADENOCARCINOMA OF PROSTATE (MULTI): ICD-10-CM

## 2024-02-13 PROBLEM — M75.52 CHRONIC SHOULDER BURSITIS, LEFT: Status: ACTIVE | Noted: 2024-02-13

## 2024-02-13 PROCEDURE — 3078F DIAST BP <80 MM HG: CPT | Performed by: INTERNAL MEDICINE

## 2024-02-13 PROCEDURE — 99214 OFFICE O/P EST MOD 30 MIN: CPT | Performed by: INTERNAL MEDICINE

## 2024-02-13 PROCEDURE — 1126F AMNT PAIN NOTED NONE PRSNT: CPT | Performed by: INTERNAL MEDICINE

## 2024-02-13 PROCEDURE — 1036F TOBACCO NON-USER: CPT | Performed by: INTERNAL MEDICINE

## 2024-02-13 PROCEDURE — 1159F MED LIST DOCD IN RCRD: CPT | Performed by: INTERNAL MEDICINE

## 2024-02-13 PROCEDURE — 3074F SYST BP LT 130 MM HG: CPT | Performed by: INTERNAL MEDICINE

## 2024-02-13 ASSESSMENT — ENCOUNTER SYMPTOMS
SHORTNESS OF BREATH: 0
PALPITATIONS: 0
FEVER: 0
COUGH: 0
UNEXPECTED WEIGHT CHANGE: 0
DIFFICULTY URINATING: 0
WHEEZING: 0
ABDOMINAL DISTENTION: 0
HEADACHES: 0

## 2024-02-13 NOTE — PROGRESS NOTES
"Subjective   Patient ID: Jf Watts \"ZORAN" is a 89 y.o. male who presents for Follow-up.  HPI  Fluid balance-weight and leg swelling  Furosemide 80 mg  Nutrition referral  Sodium review     Review of Systems   Constitutional:  Positive for fatigue.   Respiratory: Negative.     Cardiovascular:  Positive for leg swelling. Negative for chest pain and palpitations.   Gastrointestinal:  Negative for abdominal distention.   Neurological: Negative.        Objective   Physical Exam  Constitutional:       Appearance: Normal appearance.   HENT:      Mouth/Throat:      Pharynx: Oropharynx is clear.   Neck:      Comments: No JVD  Cardiovascular:      Rate and Rhythm: Normal rate and regular rhythm.      Heart sounds: Normal heart sounds.   Pulmonary:      Effort: Pulmonary effort is normal.      Breath sounds: Normal breath sounds.   Abdominal:      General: Abdomen is flat. Bowel sounds are normal.      Palpations: Abdomen is soft.   Musculoskeletal:      Right lower leg: Edema present.      Left lower leg: Edema present.      Comments: Compression stockings, nontender   Skin:     Findings: Lesion present.      Comments: Multiple keratosis and nevi   Neurological:      General: No focal deficit present.      Mental Status: He is alert.      Gait: Gait abnormal.      Comments: Ambulatory with cane       /70   Pulse 70   Resp 16   Wt 90.7 kg (200 lb)   BMI 30.41 kg/m²     Data  2/9/2024 glucose 110, creatinine 2.14, GFR 28, sodium 137, potassium 4.9, albumin 3.9 stable  1/15/2024 glucose 73 , creatinine 2.2, GFR 26  Parathyroid 144 calcium 9.9  Interval consults reviewed:  Ophthalmology consult 1/8/2024  Cardiology consult 12/18/2023 increase furosemide from 60-80 daily  Psychology 12/11/2023  Nephrology 11/20/2023  GI consult 11/1/2023  ENT consult 10/25/2023  Renal ultrasound 4/16/2021  Assessment/Plan     Stable continue current therapy    Problem List Items Addressed This Visit       Adenocarcinoma of " prostate (CMS/HCC)    Edema - Primary    Hypertension    CKD (chronic kidney disease), stage IV (CMS/HCC)

## 2024-02-19 ENCOUNTER — OFFICE VISIT (OUTPATIENT)
Dept: CARDIOLOGY | Facility: CLINIC | Age: 89
End: 2024-02-19
Payer: COMMERCIAL

## 2024-02-19 VITALS
OXYGEN SATURATION: 92 % | HEIGHT: 68 IN | DIASTOLIC BLOOD PRESSURE: 51 MMHG | HEART RATE: 69 BPM | WEIGHT: 204.1 LBS | BODY MASS INDEX: 30.93 KG/M2 | SYSTOLIC BLOOD PRESSURE: 91 MMHG

## 2024-02-19 DIAGNOSIS — R06.09 DYSPNEA ON EXERTION: Primary | ICD-10-CM

## 2024-02-19 DIAGNOSIS — I25.10 MILD CAD: ICD-10-CM

## 2024-02-19 DIAGNOSIS — I50.9 CONGESTIVE HEART FAILURE, UNSPECIFIED HF CHRONICITY, UNSPECIFIED HEART FAILURE TYPE (MULTI): ICD-10-CM

## 2024-02-19 PROCEDURE — 3074F SYST BP LT 130 MM HG: CPT | Performed by: INTERNAL MEDICINE

## 2024-02-19 PROCEDURE — 99214 OFFICE O/P EST MOD 30 MIN: CPT | Performed by: INTERNAL MEDICINE

## 2024-02-19 PROCEDURE — 1159F MED LIST DOCD IN RCRD: CPT | Performed by: INTERNAL MEDICINE

## 2024-02-19 PROCEDURE — 1126F AMNT PAIN NOTED NONE PRSNT: CPT | Performed by: INTERNAL MEDICINE

## 2024-02-19 PROCEDURE — 1036F TOBACCO NON-USER: CPT | Performed by: INTERNAL MEDICINE

## 2024-02-19 PROCEDURE — 3078F DIAST BP <80 MM HG: CPT | Performed by: INTERNAL MEDICINE

## 2024-02-19 ASSESSMENT — PATIENT HEALTH QUESTIONNAIRE - PHQ9
SUM OF ALL RESPONSES TO PHQ9 QUESTIONS 1 AND 2: 0
2. FEELING DOWN, DEPRESSED OR HOPELESS: NOT AT ALL
1. LITTLE INTEREST OR PLEASURE IN DOING THINGS: NOT AT ALL

## 2024-02-19 ASSESSMENT — COLUMBIA-SUICIDE SEVERITY RATING SCALE - C-SSRS
2. HAVE YOU ACTUALLY HAD ANY THOUGHTS OF KILLING YOURSELF?: NO
6. HAVE YOU EVER DONE ANYTHING, STARTED TO DO ANYTHING, OR PREPARED TO DO ANYTHING TO END YOUR LIFE?: NO
1. IN THE PAST MONTH, HAVE YOU WISHED YOU WERE DEAD OR WISHED YOU COULD GO TO SLEEP AND NOT WAKE UP?: NO

## 2024-02-19 ASSESSMENT — PAIN SCALES - GENERAL: PAINLEVEL: 0-NO PAIN

## 2024-02-19 ASSESSMENT — ENCOUNTER SYMPTOMS
MEMORY LOSS: 0
HEADACHES: 0
NAUSEA: 0
DYSURIA: 0
FEVER: 0
FALLS: 0
MYALGIAS: 0
HEMOPTYSIS: 0
VOMITING: 0
BLOATING: 0
HEMATURIA: 0
OCCASIONAL FEELINGS OF UNSTEADINESS: 1
ABDOMINAL PAIN: 0
LOSS OF SENSATION IN FEET: 1
COUGH: 0
ALTERED MENTAL STATUS: 0
DEPRESSION: 0
CONSTIPATION: 0
DIARRHEA: 0
CHILLS: 0

## 2024-02-19 NOTE — PATIENT INSTRUCTIONS
Decrease Aspirin to only Mon-Wed-Fri    If weight doesn't keep going down and get below 190, increase Furosemide from 4 to 5 tablets in AM

## 2024-02-19 NOTE — PROGRESS NOTES
Chief complaint:  FU     HPI  89 yo WM w/ h/o mild CM, HFrEF -> HFpEF, pulm HTN, mild CAD, elevated CCS, HTN, HPL, CKD, FHx CAD now here for cardiology f/u. No chest pain. No sig dyspnea at rest. +ch HANKINS (worst after bending over), prev no sig improvement on increased diuretics. +occ mild orthopnea/wheeze (more when 1st getting into bed and resolves after seconds). No PND. No palps. +occ exertional/positional LH/dizziness. No syncope. +ch dependent LE edema. No claudication. +occ cough (no change on allergy med or PPI). +occ fatigue. +occ snoring (when on back). +occ B arm weakness x 30-60 seconds. +ch foot numb. +easy bleeding. Wgt remains up.  BP at home (occ checked): ~130/60, HR 60s  Wgt at home: ~195-198 lbs (base ~185 lbs)  ECG 6/17: SR (70), 1st deg AVB, LAFB  ECG 12/17: SR (81), LAFB, LVH w/ ST changes  ECG 12/18: SR (64), 1st deg AVB, LAFB, LVH  ECG 1/20: SB (58), 1st deg AVB, LBBB  ECG 6/22: SB (57), IVCD  ECG 1/23: SR (64), IVCD, PVCs/bigeminy  Echo 11/17: EF 35-40%, ecc LVH, DD, LAE, mild MR, mild AI  Echo 4/21: EF 50-55%, DD, mod LAE, valves ok  CCS 12/17: 744 (LM 0, , LCx 0, ), small R pleural effusion, sm-mod HH  Cath 5/18: pLAD 30%, pRCA 10-30%, LVEDP 22-27, PCW 22, PA 60/26/41, CO 5.3, CI 2.6, no shunt, no AS  CXR 1/17: no acute abnl  CXR 3/19: HH, subseg atelectasis at LL base, otherwise lungs clear  CT brain 6/17: no acute abnl     Review of Systems   Constitutional: Negative for chills, fever and malaise/fatigue.   HENT:  Negative for hearing loss.    Eyes:  Negative for visual disturbance.   Respiratory:  Negative for cough and hemoptysis.    Skin:  Negative for rash.   Musculoskeletal:  Negative for falls and myalgias.   Gastrointestinal:  Negative for bloating, abdominal pain, constipation, diarrhea, dysphagia, nausea and vomiting.   Genitourinary:  Negative for dysuria and hematuria.   Neurological:  Negative for headaches.   Psychiatric/Behavioral:  Negative for altered mental  status, depression and memory loss.       Social History     Tobacco Use    Smoking status: Never     Passive exposure: Never    Smokeless tobacco: Never   Substance Use Topics    Alcohol use: Yes     Comment: 3/week      Family History   Problem Relation Name Age of Onset    Alcohol abuse Mother      No Known Problems Father      Heart disease Brother          Age 53    Other (Age 95) Brother      No Known Problems Daughter      No Known Problems Son      No Known Problems Son      Other (Drug overdose) Son        Allergies   Allergen Reactions    Penicillins Hives and Swelling    Sulfa (Sulfonamide Antibiotics) Hives and Swelling      Current Outpatient Medications   Medication Instructions    aspirin 81 mg EC tablet 1 tablet, oral, Daily    calcium carbonate-vitamin D3 600 mg-20 mcg (800 unit) tablet 1 tablet, oral, Daily    furosemide (LASIX) 80 mg, oral, Daily    isosorbide mononitrate ER (Imdur) 120 mg 24 hr tablet oral    losartan (COZAAR) 25 mg, oral, Daily    melatonin 10 mg capsule 1 capsule, oral, Nightly    metoprolol succinate XL (TOPROL-XL) 100 mg, oral, Daily    mirtazapine (REMERON) 7.5 mg, oral, Nightly    multivit-iron-minerals-folic acid (Centrum Silver) 0.4 mg-300 mcg- 250 mcg tab 1 tablet, oral, Daily    nepafenac (Nevanac) 0.1 % ophthalmic suspension 1 drop, ophthalmic (eye), 3 times daily    omeprazole (PRILOSEC) 20 mg, oral, Daily before breakfast    simvastatin (ZOCOR) 20 mg, oral, Nightly    spironolactone (Aldactone) 25 mg tablet 1 tablet, oral, Daily    timolol (Timoptic) 0.5 % ophthalmic solution ophthalmic (eye), Every 12 hours    traZODone (Desyrel) 50 mg tablet oral      Vitals:    02/19/24 1414   BP: 91/51   Pulse: 69   SpO2: 92%      Physical Exam  Constitutional:       Appearance: Normal appearance.   HENT:      Head: Normocephalic and atraumatic.      Nose: Nose normal.   Cardiovascular:      Rate and Rhythm: Normal rate and regular rhythm.      Heart sounds: No murmur  heard.  Pulmonary:      Effort: Pulmonary effort is normal.      Breath sounds: Normal breath sounds.   Abdominal:      Palpations: Abdomen is soft.      Tenderness: There is no abdominal tenderness.   Musculoskeletal:      Right lower leg: Edema present.      Left lower leg: Edema present.      Comments: 1+ pitting LE edema   Skin:     General: Skin is warm and dry.   Neurological:      General: No focal deficit present.      Mental Status: He is alert.   Psychiatric:         Mood and Affect: Mood normal.         Judgment: Judgment normal.        Results/Data  2/24 Cr 2.14, K 4.9  1/24 Cr 2.22, K 4.9, LDL 58, HGB 11.8, , TSH 3.92  6/23 Cr 1.53, K 4.8, ALT/AST nl, LDL 71, HDL 46, , Chol 145, HGB 12.5, , TSH 3.73  1/23 Cr 1.72, K 4.9, LFT nl, HGB 11.1,   10/22 Cr 1.35, K 4.3, LFT nl  6/22 Cr 1.62, K 4.8, Na 138, ALT/AST nl, LDL 74, HDL 46, , Chol 145, HGB 13.3, , TSH 3.48  1/22 Cr 1.56, K 4.6  11/21 Cr 2.02, K 5.2, LFT nl, HGB 12.9,   10/21 Cr 1.83, K 5.1  9/21 Cr 1.86, K 5.1, HGB 12.5,   8/21 Cr 2.33, K 4.8  7/21 Cr 3.02, K 4.3, Mg 2.24, BNP 39 (Lasix, [place], Metolazone)  5/21 Cr 2.38, K 4.6, BUN 52 (Lasix 80, Sprio 25)  4/21 Cr 2.02, K 5.1  3/21 HGB 13.4,   12/20 Cr 1.78, K 4.3, HGB 12.1, , BNP 96  9/20 Cr 1.4, K 4.7, HGB 11.9, ,   7/20 Cr 1.84, K 4.5  1/20 Cr 1.3, K 4.9, ALT/AST nl, LDL 77, HDL 48, TG 75, Chol 140, HGB 11.1, , TSH 5.2  10/19 Cr 1.7, K 4.6, ALT/AST nl, LDL 83, HDL 46, , Chol 157, HGB 13, , TSH 4.5, FT4 1.2  5/19 Cr 1.4, K 4.3  3/19 Cr 1.7, K 4.5, BNP 70  2/19 Cr 1.91, K 4.8, BNP 99  11/18 Cr 1.6, K 4.6, LFT nl, HGB 12.5, , HDL 48, Chol 182  5/18 Cr 1.49, K 4.4  5/18 Cr 1.4, K 4.8, LFT nl, HGB 13.5, , BNP 59  4/18 Cr 1.3, K 5.2, HGB 12.8,   12/17 Cr 1.13, K 4.3,   12/17 Cr 0.9, K 3.8, HGB 13.4, , TSH 2.7  6/17 ALT/AST nl, LDL 91, HDL 59, , Chol 170  1/17 TPN  neg     Assessment/Plan   89 yo WM w/ h/o mild CM, HFrEF -> HFpEF, pulm HTN, mild CAD, elevated CCS, HTN, HPL, CKD, FHx CAD now with continued acute on chronic decompensated CHF. He prefers to hold off on increasing Lasix unless weight doesn't keep coming down (he said has been improving).   CM essentially resolved on BB/ARB - EF now low normal to very mild reduced at 50-55%.  L/R press elevated. Pulm HTN most likely sec to CHF. Consider PFTs (earlene if more dyspnea/wheezing). Okay to remain conservative in this elderly patient - try to minimize symptoms.  -okay to change ASA 81 every day to qMWF (patient's request to get off due to easy bleeding)  -continue Metoprolol Succinate 100 bid  -continue Losartan 25 qd   -continue Imdur 60 qd   -continue Spironolactone 25 qd  -continue Furosemide 80 every day - can increase to 100 if needed (he claims Bumex did not work for him; kidney prev irritated on Metolazone)  -continue Simva 20 qhs  -f/u 2-3 months (earlier if needed)     Mike Lao MD

## 2024-02-29 VITALS
RESPIRATION RATE: 16 BRPM | DIASTOLIC BLOOD PRESSURE: 70 MMHG | BODY MASS INDEX: 30.41 KG/M2 | WEIGHT: 200 LBS | SYSTOLIC BLOOD PRESSURE: 126 MMHG | HEART RATE: 70 BPM

## 2024-02-29 ASSESSMENT — ENCOUNTER SYMPTOMS
RESPIRATORY NEGATIVE: 1
NEUROLOGICAL NEGATIVE: 1
PALPITATIONS: 0
ABDOMINAL DISTENTION: 0
FATIGUE: 1

## 2024-03-07 DIAGNOSIS — K22.70 BARRETT'S ESOPHAGUS WITHOUT DYSPLASIA: ICD-10-CM

## 2024-03-07 RX ORDER — OMEPRAZOLE 20 MG/1
20 CAPSULE, DELAYED RELEASE ORAL
Qty: 90 CAPSULE | Refills: 1 | Status: SHIPPED | OUTPATIENT
Start: 2024-03-07

## 2024-03-11 ENCOUNTER — OFFICE VISIT (OUTPATIENT)
Dept: BEHAVIORAL HEALTH | Facility: CLINIC | Age: 89
End: 2024-03-11
Payer: COMMERCIAL

## 2024-03-11 VITALS
HEART RATE: 86 BPM | BODY MASS INDEX: 29.7 KG/M2 | WEIGHT: 196 LBS | HEIGHT: 68 IN | DIASTOLIC BLOOD PRESSURE: 73 MMHG | TEMPERATURE: 97.2 F | SYSTOLIC BLOOD PRESSURE: 148 MMHG

## 2024-03-11 DIAGNOSIS — G47.00 INSOMNIA, UNSPECIFIED TYPE: ICD-10-CM

## 2024-03-11 DIAGNOSIS — R44.3 HALLUCINATIONS: ICD-10-CM

## 2024-03-11 DIAGNOSIS — F41.9 ANXIETY DISORDER, UNSPECIFIED TYPE: ICD-10-CM

## 2024-03-11 PROCEDURE — 1160F RVW MEDS BY RX/DR IN RCRD: CPT | Performed by: PSYCHIATRY & NEUROLOGY

## 2024-03-11 PROCEDURE — 3078F DIAST BP <80 MM HG: CPT | Performed by: PSYCHIATRY & NEUROLOGY

## 2024-03-11 PROCEDURE — 1126F AMNT PAIN NOTED NONE PRSNT: CPT | Performed by: PSYCHIATRY & NEUROLOGY

## 2024-03-11 PROCEDURE — 3077F SYST BP >= 140 MM HG: CPT | Performed by: PSYCHIATRY & NEUROLOGY

## 2024-03-11 PROCEDURE — 99214 OFFICE O/P EST MOD 30 MIN: CPT | Performed by: PSYCHIATRY & NEUROLOGY

## 2024-03-11 PROCEDURE — 1159F MED LIST DOCD IN RCRD: CPT | Performed by: PSYCHIATRY & NEUROLOGY

## 2024-03-11 PROCEDURE — 1036F TOBACCO NON-USER: CPT | Performed by: PSYCHIATRY & NEUROLOGY

## 2024-03-11 RX ORDER — MIRTAZAPINE 7.5 MG/1
7.5 TABLET, FILM COATED ORAL NIGHTLY
Qty: 90 TABLET | Refills: 1 | Status: SHIPPED | OUTPATIENT
Start: 2024-03-11 | End: 2024-09-07

## 2024-03-11 RX ORDER — TRAZODONE HYDROCHLORIDE 50 MG/1
50 TABLET ORAL NIGHTLY
Qty: 90 TABLET | Refills: 0 | Status: SHIPPED | OUTPATIENT
Start: 2024-03-11 | End: 2024-05-28

## 2024-03-11 NOTE — PATIENT INSTRUCTIONS
1) continue to take remeron 7.5 mg in the evenings,  trazadone 25-50 mg at night as needed for sleep  2) I will see you in about 3-4  months    -If there are any thoughts of harming your self, harming others, concerning worsening of your mental health symptoms or concerning medication side effects, please call [phone] or reports to the nearest emergency room.

## 2024-03-11 NOTE — PROGRESS NOTES
Outpatient Psychiatry        Reason for Visit: follow up for depression, unspecified, remission ; insomnia ; bereavement, uncomplicated .    HPI: 89 y/o  male who presents for F/U regarding depression and insomnia. He reports he is feeling ok, still lonely related to loss of wife. Sees children once per week.   Stays active and socializes. He attends book club and play writing group. He acted in plays/musical  in the past.   He is falling asleep ok, but still awakens by 1 am. He has been using nighttime meds when he awakens at 1-2  am as he is initially able to fall asleep. He is not feeling groggy once he gets up in the am.   Goes out to dinner one to 2 times per week. attends book club monthly. conducts a grief session at [employer] weekly , goes to coffee get together weekly in his building.   Attends [employer].   no death wish or SI.   There have been no hallucinations.   He is taking mirtazapine, melatonin nightly and trazadone.   nocturia awakens him at night.   USes a cane , gets tired when walking.   no new health issues. No falls back gets sore when he walks,   He has had difficulty diuresing      past psych h/o: previously following with Angelia Cuenca NP. Began to see her after inpatient admission [id number] for depression and SI. No other psych tx. He has been on celexa, seroquel, trazadone, stoppped cymbalta 30 mg 2018. Took ambien for 10 years until 2017.      chem dep: no substance abuse h/o, but has one drink per night (1.5 ounces of liquor or 8 oz of wine).      family psych h/o: mom- alcoholic, brother - alcohol abuse and his 2 kids drink heavily.      past med h/o: glaucoma, HTN, GERD, HLD, h/o prostate CA, cardiomyopathy, B/L TKA   all: sulfa, PCN     social h/o: raised by mom and step-dad. Finished hs and straight to college - [employer]. Worked in , retired .  X2 . Currently  43 years. He has 3 bio sons- one son  from substance abuse in . One step daughter. All  living children are in the area and they have close relationships. He sees his grandchildren at family gatherings. He loves sports and curls. . He enjoys activities. 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 Played football along with other sports.          Current Medications:    Current Outpatient Medications:     aspirin 81 mg EC tablet, Take 1 tablet (81 mg) by mouth once daily., Disp: , Rfl:     calcium carbonate-vitamin D3 600 mg-20 mcg (800 unit) tablet, Take 1 tablet by mouth once daily., Disp: , Rfl:     furosemide (Lasix) 20 mg tablet, Take 4 tablets (80 mg) by mouth once daily., Disp: 360 tablet, Rfl: 3    isosorbide mononitrate ER (Imdur) 120 mg 24 hr tablet, Take by mouth., Disp: , Rfl:     losartan (Cozaar) 25 mg tablet, TAKE 1 TABLET BY MOUTH DAILY, Disp: 90 tablet, Rfl: 3    melatonin 10 mg capsule, Take 1 capsule (10 mg) by mouth once daily at bedtime., Disp: , Rfl:     metoprolol succinate XL (Toprol-XL) 100 mg 24 hr tablet, Take 1 tablet (100 mg) by mouth once daily. (Patient taking differently: Take 1 tablet (100 mg) by mouth 2 times a day.), Disp: , Rfl:     mirtazapine (Remeron) 7.5 mg tablet, Take 1 tablet (7.5 mg) by mouth once daily at bedtime., Disp: 90 tablet, Rfl: 0    multivit-iron-minerals-folic acid (Centrum Silver) 0.4 mg-300 mcg- 250 mcg tab, Take 1 tablet by mouth once daily., Disp: , Rfl:     nepafenac (Nevanac) 0.1 % ophthalmic suspension, Administer 1 drop into affected eye(s) 3 times a day., Disp: , Rfl:     omeprazole (PriLOSEC) 20 mg DR capsule, TAKE 1 CAPSULE BY MOUTH DAILY IN THE MORNING BEFORE BREAKFAST, Disp: 90 capsule, Rfl: 1    simvastatin (Zocor) 20 mg tablet, TAKE 1 TABLET BY MOUTH IN THE  EVENING, Disp: 90 tablet, Rfl: 3    spironolactone (Aldactone) 25 mg tablet, Take 1 tablet (25 mg) by mouth once daily., Disp: , Rfl:     timolol (Timoptic) 0.5 % ophthalmic solution, Administer into affected eye(s) every 12 hours., Disp: , Rfl:     traZODone (Desyrel) 50 mg  tablet, Take by mouth., Disp: , Rfl:   Medical History:  Past Medical History:   Diagnosis Date    Acquired trigger finger 06/06/2023    Acute bronchitis 06/06/2023    Acute depression 06/06/2023    Anxiety in acute stress reaction 06/06/2023    Bacterial pneumonia 06/06/2023    Blepharitis 06/06/2023    Chronic otitis media of left ear 06/06/2023    Conjunctivitis 06/06/2023    Cough 06/06/2023    Disease due to severe acute respiratory syndrome coronavirus 2 (SARS-CoV-2) 06/06/2023    Dysphonia 05/15/2019    Dysphonia    Encounter for debridement of left postmastoidectomy cavity 06/06/2023    Exposure to COVID-19 virus 06/06/2023    Fever of unknown origin 06/06/2023    GERD (gastroesophageal reflux disease) 06/20/2023    Heart failure with preserved left ventricular function (HFpEF) (CMS/[facility]) 06/06/2023    Hip pain, left 06/06/2023    Hyperlipidemia 06/20/2023    Impacted cerumen of left ear 06/06/2023    Lightheadedness 06/06/2023    Middle ear infection, chronic 06/06/2023    Osteoarthritis of knee, unspecified 05/16/2016    Osteoarthritis of knee    Other conditions influencing health status     Prostate Cancer    Other conditions influencing health status     Colonoscopy (Fiberoptic)    Other specified postprocedural states 11/03/2018    Status post cardiac catheterization    Personal history of other diseases of the circulatory system 02/15/2022    History of cardiomyopathy    Personal history of other diseases of the circulatory system     History of mitral valve disorder    Personal history of other diseases of the circulatory system     History of hypertension    Personal history of other diseases of the digestive system     History of esophageal reflux    Personal history of other endocrine, nutritional and metabolic disease     History of hyperlipidemia    Senile ectropion of left lower eyelid 03/07/2018    Formatting of this note might be different from the original. Added automatically from request  for surgery [id number]    Severe single current episode of major depressive disorder, with psychotic features (CMS/[facility]) 06/06/2023    Spontaneous rupture of extensor tendons, left hand 12/15/2017    Nontraumatic rupture of sagittal band of extensor tendon of left upper extremity    Spontaneous rupture of extensor tendons, right hand 11/17/2017    Nontraumatic rupture of sagittal band of extensor tendon of right upper extremity    Unspecified systolic (congestive) heart failure (CMS/[facility]) 09/26/2022    HFrEF (heart failure with reduced ejection fraction)     Surgical History:  Past Surgical History:   Procedure Laterality Date    ANKLE ARTHROSCOPY W/ OPEN REPAIR  10/23/2013    Ankle Repair    APPENDECTOMY  10/23/2013    Appendectomy    CATARACT EXTRACTION  10/23/2013    Cataract Surgery    OTHER SURGICAL HISTORY  10/23/2013    Mastoidectomy    TOTAL KNEE ARTHROPLASTY  02/04/2014    Total Knee Arthroplasty     Family History:  Family History   Problem Relation Name Age of Onset    Alcohol abuse Mother      No Known Problems Father      Heart disease Brother          Age 53    Other (Age 95) Brother      No Known Problems Daughter      No Known Problems Son      No Known Problems Son      Other (Drug overdose) Son       Social History:  Social History     Socioeconomic History    Marital status:      Spouse name: Not on file    Number of children: Not on file    Years of education: Not on file    Highest education level: Not on file   Occupational History    Not on file   Tobacco Use    Smoking status: Never     Passive exposure: Never    Smokeless tobacco: Never   Substance and Sexual Activity    Alcohol use: Yes     Comment: 3/week    Drug use: Never    Sexual activity: Not on file   Other Topics Concern    Not on file   Social History Narrative    Not on file     Social Determinants of Health     Financial Resource Strain: Not on file   Food Insecurity: Not on file   Transportation Needs: Not on file   Physical  "Activity: Not on file   Stress: Not on file   Social Connections: Not on file   Intimate Partner Violence: Not on file   Housing Stability: Not on file       Medical Review Of Systems:  Pertinent items are noted in HPI.    Psychiatric Review Of Systems:  Psychiatric ROS - Adult  Anxiety: Negative  Depression: sad at times due to loss of wife  Delirium: negative  Psychosis: negative  Jenn: negative  Safety Issues: none    Disordered Eating Symptoms:n/a  Inattentive Symptoms:n/a   Hyperactive/Impulsive Symptoms:n/a    Trauma Symptoms:n/a       There were no vitals taken for this visit.     LABS  Lab Results   Component Value Date    TSH 3.92 01/15/2024    25 60.2 04/08/2022    [id number] 417 10/25/2022    HDL 43.0 01/15/2024      Lab Results   Component Value Date    CHOL 137 01/15/2024    CHOL 145 06/12/2023    CHOL 145 06/09/2022     Lab Results   Component Value Date    HDL 43.0 01/15/2024    HDL 46.4 06/12/2023    HDL 45.6 06/09/2022     Lab Results   Component Value Date    LDLCALC 58 01/15/2024     Lab Results   Component Value Date    TRIG 181 (H) 01/15/2024    TRIG 138 06/12/2023    TRIG 129 06/09/2022       Chemistry    Lab Results   Component Value Date/Time     02/09/2024 1436    K 4.9 02/09/2024 1436     02/09/2024 1436    CO2 27 02/09/2024 1436    BUN 46 (H) 02/09/2024 1436    CREATININE 2.14 (H) 02/09/2024 1436    Lab Results   Component Value Date/Time    CALCIUM 9.9 02/09/2024 1436    ALKPHOS 62 01/04/2023 0000    AST 14 01/15/2024 1542    ALT 12 01/15/2024 1542    BILITOT 0.6 01/04/2023 0000          No components found for: \"CHOLHDL\"       Mental Status Examination  General Appearance: Well groomed, appropriate eye contact.  Gait/Station: Within normal limits  Speech: uses cane for stability   Mood: ok  Affect: Euthymic, full-range  Thought Process: Linear, goal directed  Thought Associations: No loosening of associations  Thought Content: normal  Perception: No perceptual abnormalities " noted  Level of Consciousness: Alert  Orientation: Alert and oriented to person, place, time and situation  Attention and Concentration: Intact  Recent Memory: Intact as evidenced by ability to recall details from the past 24 hours   Remote Memory: Intact as evidenced by ability to recall previous medical issues   Executive function: Intact  Language: Naming intact  Fund of knowledge: Good  Insight: intact  Judgment: intact         Assessment/Plan   This 89 y/o male has a h/o of one depressive episode (jan 2017) in the setting of low Na and stress. He has not had previous or subsequent significant mood struggles- currently appropriate bereavement regarding loss of wife. He was maintained on Seroquel (which was eventually stopped) and trazodone to address insomnia (sleep has been a chronic difficulty and he had used ambien for about 10 years prior to depressive episode in 2017). He is now taking remeron, trazadone, melatonin . Sleep is pretty good most nights, but there are some episodes of middle insomnia. . No hypnopompic hallucinations.  There are no acute safety issues such as SI/HI.       dx: depression, unspecified, remission ; insomnia ; bereavement, uncomplicated      1) continue mirtazapine 7.5 mg at night for sleep, trazadone 25-50 mg at night for sleep improvement, melatonin used as needed,  taking balance of nature vitamins  2) f/u 3 months or earlier if needed   3) labs reviewed  4) safety plan reviewed - if there are thoughts of self harm, concerning side effects or significant symptoms worsening report to the ER, call [phone].          Review with patient: Treatment plan reviewed with the patient.  Medication risks/benefit reviewed with the patient  Psychiatric Risk Assessment  Violence Risk Assessment: male  Acute Risk of Harm to Others is Considered: low   Suicide Risk Assessment: age > 65 yrs old  Protective Factors against Suicide: adherence to  treatment, hopefulness/future orientation, moral  objections to suicide, positive family relationships, Shinto affiliation/spirituality, sense of responsibility toward family, social support/connectedness, and strong coping skills  Acute Risk of Harm to Self is Considered: low        Bee Hoyt, DO

## 2024-03-12 ENCOUNTER — APPOINTMENT (OUTPATIENT)
Dept: ORTHOPEDIC SURGERY | Facility: HOSPITAL | Age: 89
End: 2024-03-12
Payer: COMMERCIAL

## 2024-03-18 ENCOUNTER — OFFICE VISIT (OUTPATIENT)
Dept: NEPHROLOGY | Facility: CLINIC | Age: 89
End: 2024-03-18
Payer: COMMERCIAL

## 2024-03-18 VITALS
HEART RATE: 65 BPM | BODY MASS INDEX: 30.68 KG/M2 | SYSTOLIC BLOOD PRESSURE: 105 MMHG | TEMPERATURE: 97.2 F | DIASTOLIC BLOOD PRESSURE: 67 MMHG | WEIGHT: 201.8 LBS

## 2024-03-18 DIAGNOSIS — N18.32 STAGE 3B CHRONIC KIDNEY DISEASE (MULTI): Primary | ICD-10-CM

## 2024-03-18 PROCEDURE — 99214 OFFICE O/P EST MOD 30 MIN: CPT | Performed by: INTERNAL MEDICINE

## 2024-03-18 PROCEDURE — 1159F MED LIST DOCD IN RCRD: CPT | Performed by: INTERNAL MEDICINE

## 2024-03-18 PROCEDURE — 3074F SYST BP LT 130 MM HG: CPT | Performed by: INTERNAL MEDICINE

## 2024-03-18 PROCEDURE — 1036F TOBACCO NON-USER: CPT | Performed by: INTERNAL MEDICINE

## 2024-03-18 PROCEDURE — 3078F DIAST BP <80 MM HG: CPT | Performed by: INTERNAL MEDICINE

## 2024-03-18 PROCEDURE — 1160F RVW MEDS BY RX/DR IN RCRD: CPT | Performed by: INTERNAL MEDICINE

## 2024-03-18 NOTE — PATIENT INSTRUCTIONS
Take Lasix 100 mg alternating with 80 mg each day  Keep an eye on your weight, if it keeps going up, then take 100 mg daily of Lasix

## 2024-03-18 NOTE — PROGRESS NOTES
For follow up, doing well.  Has been taking Lasix 100 mg per day since 2 weeks, does not think swelling or SoB is any different  Has SoB only on bending, has SoB on first lying down, resolved in 5 seconds  No hospitalizations/illness since last visit  Denies orthostatic symptoms    RoS negative for all other systems except as noted above.    Vitals:    03/18/24 1313   BP: 105/67   Pulse: 65   Temp: 36.2 °C (97.2 °F)     No distress  HEENT:  moist, no pallor  1+ edema olvin LE wearing compression socks  Breath sounds olvin equal, clear  S1 S2 regular, normal, no rub or murmur  Abdomen soft  AAO x3, non focal    Sodium   Date/Time Value Ref Range Status   02/09/2024 02:36  136 - 145 mmol/L Final   01/15/2024 03:42  136 - 145 mmol/L Final   10/20/2023 12:54  136 - 145 mmol/L Final     Potassium   Date/Time Value Ref Range Status   02/09/2024 02:36 PM 4.9 3.5 - 5.3 mmol/L Final   01/15/2024 03:42 PM 4.9 3.5 - 5.3 mmol/L Final   10/20/2023 12:54 PM 4.9 3.5 - 5.3 mmol/L Final     Chloride   Date/Time Value Ref Range Status   02/09/2024 02:36  98 - 107 mmol/L Final   01/15/2024 03:42 PM 97 (L) 98 - 107 mmol/L Final   10/20/2023 12:54 PM 98 98 - 107 mmol/L Final     Urea Nitrogen   Date/Time Value Ref Range Status   02/09/2024 02:36 PM 46 (H) 6 - 23 mg/dL Final   01/15/2024 03:42 PM 49 (H) 6 - 23 mg/dL Final   10/20/2023 12:54 PM 37 (H) 6 - 23 mg/dL Final     Creatinine   Date/Time Value Ref Range Status   02/09/2024 02:36 PM 2.14 (H) 0.50 - 1.30 mg/dL Final   01/15/2024 03:42 PM 2.22 (H) 0.50 - 1.30 mg/dL Final   10/20/2023 12:54 PM 1.87 (H) 0.50 - 1.30 mg/dL Final     eGFR   Date/Time Value Ref Range Status   02/09/2024 02:36 PM 28 (L) >60 mL/min/1.73m*2 Final     Comment:     Calculations of estimated GFR are performed using the 2021 CKD-EPI Study Refit equation without the race variable for the IDMS-Traceable creatinine methods.  https://jasn.asnjournals.org/content/early/2021/09/22/ASN.[email or web address]    01/15/2024 03:42 PM 26 (L) >60 mL/min/1.73m*2 Final     Comment:     Calculations of estimated GFR are performed using the 2021 CKD-EPI Study Refit equation without the race variable for the IDMS-Traceable creatinine methods.  https://jasn.asnjournals.org/content/early/2021/09/22/ASN.[email or web address]   10/20/2023 12:54 PM 32 (L) >60 mL/min/1.73m*2 Final     Comment:     Calculations of estimated GFR are performed using the 2021 CKD-EPI Study Refit equation without the race variable for the IDMS-Traceable creatinine methods.  https://jasn.asnjournals.org/content/early/2021/09/22/[email or web address]     Calcium   Date/Time Value Ref Range Status   02/09/2024 02:36 PM 9.9 8.6 - 10.6 mg/dL Final   01/15/2024 03:42 PM 9.5 8.6 - 10.6 mg/dL Final   10/20/2023 12:54 PM 9.5 8.6 - 10.6 mg/dL Final     Phosphorus   Date/Time Value Ref Range Status   02/09/2024 02:36 PM 3.8 2.5 - 4.9 mg/dL Final     Comment:     The performance characteristics of phosphorus testing in heparinized plasma have been validated by the individual  laboratory site where testing is performed. Testing on heparinized plasma is not approved by the FDA; however, such approval is not necessary.   01/15/2024 03:42 PM 3.9 2.5 - 4.9 mg/dL Final     Comment:     The performance characteristics of phosphorus testing in heparinized plasma have been validated by the individual  laboratory site where testing is performed. Testing on heparinized plasma is not approved by the FDA; however, such approval is not necessary.   10/20/2023 12:54 PM 3.7 2.5 - 4.9 mg/dL Final     Comment:     The performance characteristics of phosphorus testing in heparinized plasma have been validated by the individual  laboratory site where testing is performed. Testing on heparinized plasma is not approved by the FDA; however, such approval is not necessary.     Parathyroid Hormone, Intact   Date/Time Value Ref Range Status   01/15/2024 03:42 .9 (H) 18.5 - 88.0 pg/mL Final     PTH    Date/Time Value Ref Range Status   04/08/2022 02:09 .1 (H) 18.5 - 88.0 pg/mL Final     Vitamin D, 25-Hydroxy, Total   Date/Time Value Ref Range Status   01/15/2024 03:42 PM 46 30 - 100 ng/mL Final     Vitamin D, 25-Hydroxy   Date/Time Value Ref Range Status   06/09/2022 09:59 AM 44 ng/mL Final     Comment:     .  DEFICIENCY:         < 20   NG/ML  INSUFFICIENCY:      20-29  NG/ML  SUFFICIENCY:         NG/ML    THIS ASSAY ACCURATELY QUANTIFIES THE SUM OF  VITAMIN D3, 25-HYDROXY AND VIT D2,25-HYDROXY.       Albumin/Creatine Ratio   Date/Time Value Ref Range Status   01/15/2024 02:19 PM   Final     Comment:     One or more analytes used in this calculation is outside of the analytical measurement range. Calculation cannot be performed.     Hemoglobin   Date/Time Value Ref Range Status   01/15/2024 03:42 PM 11.8 (L) 13.5 - 17.5 g/dL Final   10/20/2023 12:54 PM 11.7 (L) 13.5 - 17.5 g/dL Final   06/12/2023 02:09 PM 12.5 (L) 13.5 - 17.5 g/dL Final   01/04/2023 12:00 AM 11.1 (L) 13.5 - 17.5 g/dL Final   10/25/2022 01:30 PM 12.4 (L) 13.5 - 17.5 g/dL Final     89 -year-old male with medical history of CKD 3b (baseline serum creatinine 1.4 - 2.4 mg/dL), HTN, mild CM, HFrEF -> HFpEF (EF 50-55%, 4/7/21), pulm HTN, mild CAD, and HLD.     # CKD3b: Creatinine 2.1 mg/dl, eGFR 28 ml/min on 2/9/24, slightly worse, likely due to diuretic use. Discussed the balancing act with cardiorenal physiology and that avoidance of HF exacerbation takes precedence over maintaining creatinine stability.Ct Lasix 100 mg/day alternating with 80 mg/day, leg elevation, compression sock use. Weigh daily and take extra dose of Lasix 100 mg daily in case of weight gain. He knows to avoid NSAIDs.     # HTN: On losartan 25 mg once a day, metoprolol succinate 100 mg  a day, spironolactone 25 mg once a day, furosemide as above. Blood pressure acceptable. Continue current medications without changes. 2.5 g/day sodium restricted diet discussed  again     # LE edema, weight gain: as above      RTC: In 6 months

## 2024-04-05 DIAGNOSIS — I50.9 CONGESTIVE HEART FAILURE, UNSPECIFIED HF CHRONICITY, UNSPECIFIED HEART FAILURE TYPE (MULTI): ICD-10-CM

## 2024-04-05 RX ORDER — FUROSEMIDE 20 MG/1
80 TABLET ORAL DAILY
Qty: 360 TABLET | Refills: 3 | Status: SHIPPED | OUTPATIENT
Start: 2024-04-05 | End: 2024-05-17 | Stop reason: SDUPTHER

## 2024-05-17 ENCOUNTER — HOSPITAL ENCOUNTER (OUTPATIENT)
Dept: RADIOLOGY | Facility: CLINIC | Age: 89
Discharge: HOME | End: 2024-05-17
Payer: COMMERCIAL

## 2024-05-17 ENCOUNTER — OFFICE VISIT (OUTPATIENT)
Dept: CARDIOLOGY | Facility: CLINIC | Age: 89
End: 2024-05-17
Payer: COMMERCIAL

## 2024-05-17 VITALS
WEIGHT: 202.4 LBS | SYSTOLIC BLOOD PRESSURE: 89 MMHG | BODY MASS INDEX: 31.77 KG/M2 | DIASTOLIC BLOOD PRESSURE: 54 MMHG | OXYGEN SATURATION: 90 % | HEIGHT: 67 IN | HEART RATE: 59 BPM

## 2024-05-17 DIAGNOSIS — I50.30 HEART FAILURE WITH PRESERVED EJECTION FRACTION, UNSPECIFIED HF CHRONICITY (MULTI): ICD-10-CM

## 2024-05-17 DIAGNOSIS — R06.09 DOE (DYSPNEA ON EXERTION): ICD-10-CM

## 2024-05-17 DIAGNOSIS — I50.9 CONGESTIVE HEART FAILURE, UNSPECIFIED HF CHRONICITY, UNSPECIFIED HEART FAILURE TYPE (MULTI): ICD-10-CM

## 2024-05-17 DIAGNOSIS — I25.10 MILD CAD: Primary | ICD-10-CM

## 2024-05-17 PROCEDURE — 3078F DIAST BP <80 MM HG: CPT | Performed by: INTERNAL MEDICINE

## 2024-05-17 PROCEDURE — 3074F SYST BP LT 130 MM HG: CPT | Performed by: INTERNAL MEDICINE

## 2024-05-17 PROCEDURE — 71046 X-RAY EXAM CHEST 2 VIEWS: CPT | Performed by: RADIOLOGY

## 2024-05-17 PROCEDURE — 1159F MED LIST DOCD IN RCRD: CPT | Performed by: INTERNAL MEDICINE

## 2024-05-17 PROCEDURE — 1036F TOBACCO NON-USER: CPT | Performed by: INTERNAL MEDICINE

## 2024-05-17 PROCEDURE — 99214 OFFICE O/P EST MOD 30 MIN: CPT | Performed by: INTERNAL MEDICINE

## 2024-05-17 PROCEDURE — 71046 X-RAY EXAM CHEST 2 VIEWS: CPT

## 2024-05-17 PROCEDURE — 1160F RVW MEDS BY RX/DR IN RCRD: CPT | Performed by: INTERNAL MEDICINE

## 2024-05-17 RX ORDER — FUROSEMIDE 20 MG/1
100 TABLET ORAL DAILY
Qty: 450 TABLET | Refills: 1 | Status: SHIPPED | OUTPATIENT
Start: 2024-05-17 | End: 2025-05-17

## 2024-05-17 ASSESSMENT — PATIENT HEALTH QUESTIONNAIRE - PHQ9
2. FEELING DOWN, DEPRESSED OR HOPELESS: SEVERAL DAYS
1. LITTLE INTEREST OR PLEASURE IN DOING THINGS: SEVERAL DAYS
SUM OF ALL RESPONSES TO PHQ9 QUESTIONS 1 AND 2: 2
10. IF YOU CHECKED OFF ANY PROBLEMS, HOW DIFFICULT HAVE THESE PROBLEMS MADE IT FOR YOU TO DO YOUR WORK, TAKE CARE OF THINGS AT HOME, OR GET ALONG WITH OTHER PEOPLE: NOT DIFFICULT AT ALL

## 2024-05-17 ASSESSMENT — ENCOUNTER SYMPTOMS
DEPRESSION: 0
CHILLS: 0
OCCASIONAL FEELINGS OF UNSTEADINESS: 1
DYSURIA: 0
COUGH: 0
HEMATURIA: 0
FEVER: 0
CONSTIPATION: 0
LOSS OF SENSATION IN FEET: 1
ALTERED MENTAL STATUS: 0
HEADACHES: 0
VOMITING: 0
MEMORY LOSS: 0
FALLS: 0
DIARRHEA: 0
BLOATING: 0
NAUSEA: 0
HEMOPTYSIS: 0
MYALGIAS: 0
ABDOMINAL PAIN: 0

## 2024-05-17 ASSESSMENT — COLUMBIA-SUICIDE SEVERITY RATING SCALE - C-SSRS
2. HAVE YOU ACTUALLY HAD ANY THOUGHTS OF KILLING YOURSELF?: NO
1. IN THE PAST MONTH, HAVE YOU WISHED YOU WERE DEAD OR WISHED YOU COULD GO TO SLEEP AND NOT WAKE UP?: NO
6. HAVE YOU EVER DONE ANYTHING, STARTED TO DO ANYTHING, OR PREPARED TO DO ANYTHING TO END YOUR LIFE?: NO

## 2024-05-17 NOTE — PROGRESS NOTES
Chief complaint:  FU     HPI  89 yo WM w/ h/o mild CM, HFrEF -> HFpEF, pulm HTN, mild CAD, elevated CCS, HTN, HPL, CKD, FHx CAD now here for cardiology f/u. No chest pain. No sig dyspnea at rest. +ch HANKINS (worst after bending over), prev no sig improvement on increased diuretics. +occ mild orthopnea/wheeze (more when 1st getting into bed and resolves after seconds). No PND. No palps. +occ mild exertional/positional LH/dizziness. No syncope. +ch dependent LE edema. No claudication. +occ cough (no change on allergy med or PPI). +occ fatigue. +occ snoring (when on back). +occ B arm weakness x 30-60 seconds. +ch foot numb. +easy bleeding. Wgt remains up.  BP at home (occ checked): usually <100/60 (occ up to 130/70), HR 60s  Wgt at home: ~195-198 lbs (base ~185 lbs)  ECG 6/17: SR (70), 1st deg AVB, LAFB  ECG 12/17: SR (81), LAFB, LVH w/ ST changes  ECG 12/18: SR (64), 1st deg AVB, LAFB, LVH  ECG 1/20: SB (58), 1st deg AVB, LBBB  ECG 6/22: SB (57), IVCD  ECG 1/23: SR (64), IVCD, PVCs/bigeminy  Echo 11/17: EF 35-40%, ecc LVH, DD, LAE, mild MR, mild AI  Echo 4/21: EF 50-55%, DD, mod LAE, valves ok  CCS 12/17: 744 (LM 0, , LCx 0, ), small R pleural effusion, sm-mod HH  Cath 5/18: pLAD 30%, pRCA 10-30%, LVEDP 22-27, PCW 22, PA 60/26/41, CO 5.3, CI 2.6, no shunt, no AS  CXR 1/17: no acute abnl  CXR 3/19: HH, subseg atelectasis at LL base, otherwise lungs clear  CT brain 6/17: no acute abnl     Review of Systems   Constitutional: Negative for chills, fever and malaise/fatigue.   HENT:  Negative for hearing loss.    Eyes:  Negative for visual disturbance.   Respiratory:  Negative for cough and hemoptysis.    Skin:  Negative for rash.   Musculoskeletal:  Negative for falls and myalgias.   Gastrointestinal:  Negative for bloating, abdominal pain, constipation, diarrhea, dysphagia, nausea and vomiting.   Genitourinary:  Negative for dysuria and hematuria.   Neurological:  Negative for headaches.    Psychiatric/Behavioral:  Negative for altered mental status, depression and memory loss.       Social History     Tobacco Use    Smoking status: Never     Passive exposure: Never    Smokeless tobacco: Never   Substance Use Topics    Alcohol use: Yes     Comment: rare      Family History   Problem Relation Name Age of Onset    Alcohol abuse Mother      No Known Problems Father      Heart disease Brother          Age 53    Other (Age 95) Brother      No Known Problems Daughter      No Known Problems Son      No Known Problems Son      Other (Drug overdose) Son        Allergies   Allergen Reactions    Penicillins Hives and Swelling    Sulfa (Sulfonamide Antibiotics) Hives and Swelling      Current Outpatient Medications   Medication Instructions    aspirin 81 mg EC tablet 1 tablet, oral, Daily    furosemide (LASIX) 80 mg, oral, Daily    isosorbide mononitrate ER (IMDUR) 60 mg, oral, Daily    losartan (COZAAR) 25 mg, oral, Daily    melatonin 10 mg capsule 1 capsule, oral, Nightly    metoprolol succinate XL (TOPROL-XL) 100 mg, oral, Daily    mirtazapine (REMERON) 7.5 mg, oral, Nightly    multivit-iron-minerals-folic acid (Centrum Silver) 0.4 mg-300 mcg- 250 mcg tab 1 tablet, oral, Daily    nepafenac (Nevanac) 0.1 % ophthalmic suspension 1 drop, ophthalmic (eye), 3 times daily    omeprazole (PRILOSEC) 20 mg, oral, Daily before breakfast    simvastatin (ZOCOR) 20 mg, oral, Nightly    spironolactone (ALDACTONE) 25 mg, oral, Daily    timolol (Timoptic) 0.5 % ophthalmic solution ophthalmic (eye), Every 12 hours    traZODone (DESYREL) 50 mg, oral, Nightly      Vitals:    05/17/24 1326   BP: 89/54   Pulse: 59   SpO2: 90%      Physical Exam  Constitutional:       Appearance: Normal appearance.   HENT:      Head: Normocephalic and atraumatic.      Nose: Nose normal.   Cardiovascular:      Rate and Rhythm: Normal rate and regular rhythm.      Heart sounds: No murmur heard.  Pulmonary:      Effort: Pulmonary effort is normal.       Breath sounds: Examination of the right-lower field reveals rales. Examination of the left-lower field reveals rales. Rales present.   Abdominal:      Palpations: Abdomen is soft.      Tenderness: There is no abdominal tenderness.   Musculoskeletal:      Right lower leg: Edema present.      Left lower leg: Edema present.      Comments: 1+ pitting LE edema   Skin:     General: Skin is warm and dry.   Neurological:      General: No focal deficit present.      Mental Status: He is alert.   Psychiatric:         Mood and Affect: Mood normal.         Judgment: Judgment normal.        Results/Data  2/24 Cr 2.14, K 4.9  1/24 Cr 2.22, K 4.9, LDL 58, HGB 11.8, , TSH 3.92  6/23 Cr 1.53, K 4.8, ALT/AST nl, LDL 71, HDL 46, , Chol 145, HGB 12.5, , TSH 3.73  1/23 Cr 1.72, K 4.9, LFT nl, HGB 11.1,   10/22 Cr 1.35, K 4.3, LFT nl  6/22 Cr 1.62, K 4.8, Na 138, ALT/AST nl, LDL 74, HDL 46, , Chol 145, HGB 13.3, , TSH 3.48  1/22 Cr 1.56, K 4.6  11/21 Cr 2.02, K 5.2, LFT nl, HGB 12.9,   10/21 Cr 1.83, K 5.1  9/21 Cr 1.86, K 5.1, HGB 12.5,   8/21 Cr 2.33, K 4.8  7/21 Cr 3.02, K 4.3, Mg 2.24, BNP 39 (Lasix, Edison, Metolazone)  5/21 Cr 2.38, K 4.6, BUN 52 (Lasix 80, Sprio 25)  4/21 Cr 2.02, K 5.1  3/21 HGB 13.4,   12/20 Cr 1.78, K 4.3, HGB 12.1, , BNP 96  9/20 Cr 1.4, K 4.7, HGB 11.9, ,   7/20 Cr 1.84, K 4.5  1/20 Cr 1.3, K 4.9, ALT/AST nl, LDL 77, HDL 48, TG 75, Chol 140, HGB 11.1, , TSH 5.2  10/19 Cr 1.7, K 4.6, ALT/AST nl, LDL 83, HDL 46, , Chol 157, HGB 13, , TSH 4.5, FT4 1.2  5/19 Cr 1.4, K 4.3  3/19 Cr 1.7, K 4.5, BNP 70  2/19 Cr 1.91, K 4.8, BNP 99  11/18 Cr 1.6, K 4.6, LFT nl, HGB 12.5, , HDL 48, Chol 182  5/18 Cr 1.49, K 4.4  5/18 Cr 1.4, K 4.8, LFT nl, HGB 13.5, , BNP 59  4/18 Cr 1.3, K 5.2, HGB 12.8,   12/17 Cr 1.13, K 4.3,   12/17 Cr 0.9, K 3.8, HGB 13.4, , TSH 2.7  6/17 ALT/AST nl, LDL 91, HDL  59, , Chol 170  1/17 TPN neg     Assessment/Plan   89 yo WM w/ h/o mild CM, HFrEF -> HFpEF, pulm HTN, mild CAD, elevated CCS, HTN, HPL, CKD, FHx CAD now with continued acute on chronic decompensated CHF (HANKINS/edema). Try increase Lasix. Check CXR.  CM essentially resolved on BB/ARB - EF low normal to very mild reduced at 50-55%.  L/R press elevated. Pulm HTN most likely sec to CHF. Consider PFTs (earlene if more dyspnea/wheezing) or can just try MDI (he prefers to hold off for now).   Okay to remain conservative in this elderly patient - try to minimize symptoms.  -continue ASA 81 qMWF (easy bleeding on qd)  -continue Metoprolol Succinate 100 bid  -continue Losartan 25 qd  -continue Imdur 60 qd (can reduce if any symptomatic hypotension)  -continue Spironolactone 25 qd  -increase Furosemide from 80/100 every other day to 100 every day (he claims Bumex did not work for him; kidney prev irritated on Metolazone)  -continue Simva 20 qhs  -f/u 2-3 months (earlier if needed)     Mike Lao MD

## 2024-05-20 ENCOUNTER — OFFICE VISIT (OUTPATIENT)
Dept: PRIMARY CARE | Facility: CLINIC | Age: 89
End: 2024-05-20
Payer: COMMERCIAL

## 2024-05-20 ENCOUNTER — TELEPHONE (OUTPATIENT)
Dept: CARDIOLOGY | Facility: CLINIC | Age: 89
End: 2024-05-20

## 2024-05-20 DIAGNOSIS — N18.32 STAGE 3B CHRONIC KIDNEY DISEASE (MULTI): ICD-10-CM

## 2024-05-20 DIAGNOSIS — F33.2 SEVERE EPISODE OF RECURRENT MAJOR DEPRESSIVE DISORDER, WITHOUT PSYCHOTIC FEATURES (MULTI): Primary | ICD-10-CM

## 2024-05-20 DIAGNOSIS — R60.0 BILATERAL LOWER EXTREMITY EDEMA: ICD-10-CM

## 2024-05-20 DIAGNOSIS — C61 ADENOCARCINOMA OF PROSTATE (MULTI): ICD-10-CM

## 2024-05-20 PROCEDURE — 1036F TOBACCO NON-USER: CPT | Performed by: INTERNAL MEDICINE

## 2024-05-20 PROCEDURE — 3078F DIAST BP <80 MM HG: CPT | Performed by: INTERNAL MEDICINE

## 2024-05-20 PROCEDURE — 99214 OFFICE O/P EST MOD 30 MIN: CPT | Performed by: INTERNAL MEDICINE

## 2024-05-20 PROCEDURE — 3074F SYST BP LT 130 MM HG: CPT | Performed by: INTERNAL MEDICINE

## 2024-05-20 RX ORDER — BUPROPION HYDROCHLORIDE 75 MG/1
75 TABLET ORAL 2 TIMES DAILY
Qty: 60 TABLET | Refills: 1 | Status: SHIPPED | OUTPATIENT
Start: 2024-05-20 | End: 2024-06-10

## 2024-05-20 ASSESSMENT — GERIATRIC DEPRESSION SCALE SHORT VERSION (GDS-SV)
DO YOU FEEL THAT YOUR LIFE IS EMPTY: YES
DO YOU FEEL FULL OF ENERGY: NO
HAVE YOU DROPPED MANY OF YOUR ACTIVITIES AND INTERESTS?: YES
DO YOU THINK THAT MOST PEOPLE ARE BETTER OFF THAN YOU ARE: NO
ARE YOU IN GOOD SPIRITS MOST OF THE TIME: NO
DO YOU PREFER TO STAY AT HOME, RATHER THAN GOING OUT AND DOING NEW THINGS: YES
DO YOU FEEL THAT YOUR SITUATION IS HOPELESS: YES
DO YOU FEEL HAPPY MOST OF THE TIME: NO
DO YOU OFTEN FEEL HELPLESS: YES
ARE YOU BASICALLY SATISFIED WITH YOUR LIFE: NO
DO YOU FEEL YOU HAVE MORE PROBLEMS WITH MEMORY THAN MOST: NO
DO YOU OFTEN GET BORED: YES
DO YOU FEEL PRETTY WORTHLESS THE WAY YOU ARE NOW: YES
ARE YOU AFRAID THAT SOMETHING BAD IS GOING TO HAPPEN TO YOU: NO
GDS TOTAL SCORE: 11
DO YOU THINK IT IS WONDERFUL TO BE ALIVE NOW: YES

## 2024-05-20 ASSESSMENT — ANXIETY QUESTIONNAIRES
5. BEING SO RESTLESS THAT IT IS HARD TO SIT STILL: SEVERAL DAYS
6. BECOMING EASILY ANNOYED OR IRRITABLE: NOT AT ALL
1. FEELING NERVOUS, ANXIOUS, OR ON EDGE: NOT AT ALL
7. FEELING AFRAID AS IF SOMETHING AWFUL MIGHT HAPPEN: NOT AT ALL
2. NOT BEING ABLE TO STOP OR CONTROL WORRYING: SEVERAL DAYS
4. TROUBLE RELAXING: SEVERAL DAYS
3. WORRYING TOO MUCH ABOUT DIFFERENT THINGS: NOT AT ALL
GAD7 TOTAL SCORE: 3

## 2024-05-20 NOTE — PATIENT INSTRUCTIONS
Major depression disorder   encourage activities that bring you pleasure and interest -[employer] resources and community  Continue one half trazodone and  Start bupropion/Wellbutrin twice daily  Psychiatry referral  Follow-up here in 3 to 6 weeks

## 2024-05-20 NOTE — PROGRESS NOTES
"Subjective   Patient ID: Jf Watts \"ZORAN" is a 89 y.o. male who presents for Follow-up.  HPI   said he's depressed   No sleep, no decision, less interest  No interests other than reading, news  Interpersonal interactions limited  Lost  interests   Book reading group  Trazaodone 50  1/2   Mirtazine 7.5 whole     Amitryptiline  Clexa, lexapro cymbalta    Review of Systems   Constitutional:  Positive for activity change and fatigue. Negative for unexpected weight change.   Respiratory: Negative.     Cardiovascular: Negative.    Gastrointestinal: Negative.    Neurological:  Negative for headaches.   Psychiatric/Behavioral:  Positive for dysphoric mood and sleep disturbance. Negative for suicidal ideas. The patient is not nervous/anxious.        Objective   Physical Exam  Constitutional:       Appearance: Normal appearance.   Cardiovascular:      Rate and Rhythm: Normal rate and regular rhythm.      Heart sounds: Normal heart sounds.   Pulmonary:      Effort: Pulmonary effort is normal.      Breath sounds: Normal breath sounds.   Abdominal:      General: Abdomen is flat. Bowel sounds are normal.      Palpations: Abdomen is soft.   Musculoskeletal:      Right lower leg: Edema present.      Left lower leg: Edema present.      Comments: Compression stockings, nontender   Skin:     Findings: Lesion present.      Comments: Multiple keratosis and nevi   Neurological:      General: No focal deficit present.      Mental Status: He is alert.      Gait: Gait abnormal.      Comments: Ambulatory with cane   Psychiatric:         Attention and Perception: Attention normal.         Mood and Affect: Mood is depressed. Mood is not anxious. Affect is blunt. Affect is not angry or tearful.         Speech: Speech normal.         Behavior: Behavior is slowed. Behavior is not agitated. Behavior is cooperative.         Thought Content: Thought content normal.         Cognition and Memory: Cognition normal.       /70   " Pulse 70   Resp 16     Lab  1/15/2024-CKD stage IIIb, TSH normal    Assessment/Plan   Major depression disorder   encourage activities that bring you pleasure and interest -sen[employer] citizen resources and community  Continue one half trazodone and  Start bupropion/Wellbutrin twice daily  Psychiatry referral  Follow-up here in 3 to 6 weeks  Problem List Items Addressed This Visit       Adenocarcinoma of prostate (Multi)    CKD (chronic kidney disease) stage 3, GFR 30-59 ml/min (Multi)    Bilateral lower extremity edema    Severe episode of recurrent major depressive disorder, without psychotic features (Multi) - Primary    Relevant Orders    Referral to Psychiatry

## 2024-05-20 NOTE — TELEPHONE ENCOUNTER
Result Communication    Resulted Orders   XR chest 2 views    Narrative    Interpreted By:  Aileen Laura,   STUDY:  XR CHEST 2 VIEWS;  5/17/2024 2:19 pm      INDICATION:  Signs/Symptoms:HANKINS, cough.      COMPARISON:  03/20/2019      ACCESSION NUMBER(S):  [id number]      ORDERING CLINICIAN:  KYLIE RAYMOND      FINDINGS:  Cardiomediastinal silhouette is unchanged in size and configuration.  Equivocal trace left-sided effusion with associated atelectasis with  or without focal airspace consolidation. Right lung is clear.      IMPRESSION  Equivocal trace left-sided pleural effusion with associated  atelectasis with or without focal airspace consolidation. Consider  short-term radiographic follow up document resolution          MACRO  none      Signed by: Aileen Laura 5/19/2024 10:32 AM  Dictation workstation:   [id number]       1:27 PM      Results were successfully communicated with the patient and they acknowledged their understanding.

## 2024-05-26 DIAGNOSIS — G47.00 INSOMNIA, UNSPECIFIED TYPE: ICD-10-CM

## 2024-05-28 RX ORDER — TRAZODONE HYDROCHLORIDE 50 MG/1
50 TABLET ORAL NIGHTLY
Qty: 90 TABLET | Refills: 0 | Status: SHIPPED | OUTPATIENT
Start: 2024-05-28

## 2024-06-05 ENCOUNTER — TELEPHONE (OUTPATIENT)
Dept: PRIMARY CARE | Facility: CLINIC | Age: 89
End: 2024-06-05
Payer: COMMERCIAL

## 2024-06-05 NOTE — TELEPHONE ENCOUNTER
Patient developed hives bupropion the last 4 days. He went to dermatologist and they prescribed him prednisone, 2 pills for 5 days.    He wanted to see if he can get another RX: besides Bupropion.

## 2024-06-09 DIAGNOSIS — F33.2 SEVERE EPISODE OF RECURRENT MAJOR DEPRESSIVE DISORDER, WITHOUT PSYCHOTIC FEATURES (MULTI): ICD-10-CM

## 2024-06-10 RX ORDER — BUPROPION HYDROCHLORIDE 75 MG/1
75 TABLET ORAL 2 TIMES DAILY
Qty: 60 TABLET | Refills: 11 | Status: SHIPPED | OUTPATIENT
Start: 2024-06-10 | End: 2024-06-13 | Stop reason: SINTOL

## 2024-06-12 VITALS — DIASTOLIC BLOOD PRESSURE: 70 MMHG | RESPIRATION RATE: 16 BRPM | SYSTOLIC BLOOD PRESSURE: 120 MMHG | HEART RATE: 70 BPM

## 2024-06-12 ASSESSMENT — ENCOUNTER SYMPTOMS
NERVOUS/ANXIOUS: 0
SLEEP DISTURBANCE: 1
ACTIVITY CHANGE: 1
FATIGUE: 1
RESPIRATORY NEGATIVE: 1
HEADACHES: 0
GASTROINTESTINAL NEGATIVE: 1
CARDIOVASCULAR NEGATIVE: 1
DYSPHORIC MOOD: 1
UNEXPECTED WEIGHT CHANGE: 0

## 2024-06-13 ENCOUNTER — APPOINTMENT (OUTPATIENT)
Dept: PRIMARY CARE | Facility: CLINIC | Age: 89
End: 2024-06-13
Payer: COMMERCIAL

## 2024-06-13 DIAGNOSIS — F43.21 ADJUSTMENT DISORDER WITH DEPRESSED MOOD: Primary | ICD-10-CM

## 2024-06-13 DIAGNOSIS — Z88.9 DRUG ALLERGY: ICD-10-CM

## 2024-06-13 DIAGNOSIS — F33.2 SEVERE EPISODE OF RECURRENT MAJOR DEPRESSIVE DISORDER, WITHOUT PSYCHOTIC FEATURES (MULTI): ICD-10-CM

## 2024-06-13 DIAGNOSIS — R60.0 LOCALIZED EDEMA: ICD-10-CM

## 2024-06-13 PROCEDURE — 3078F DIAST BP <80 MM HG: CPT | Performed by: INTERNAL MEDICINE

## 2024-06-13 PROCEDURE — 3074F SYST BP LT 130 MM HG: CPT | Performed by: INTERNAL MEDICINE

## 2024-06-13 PROCEDURE — 99214 OFFICE O/P EST MOD 30 MIN: CPT | Performed by: INTERNAL MEDICINE

## 2024-06-13 ASSESSMENT — PATIENT HEALTH QUESTIONNAIRE - PHQ9
7. TROUBLE CONCENTRATING ON THINGS, SUCH AS READING THE NEWSPAPER OR WATCHING TELEVISION: NOT AT ALL
SUM OF ALL RESPONSES TO PHQ9 QUESTIONS 1 AND 2: 3
8. MOVING OR SPEAKING SO SLOWLY THAT OTHER PEOPLE COULD HAVE NOTICED. OR THE OPPOSITE, BEING SO FIGETY OR RESTLESS THAT YOU HAVE BEEN MOVING AROUND A LOT MORE THAN USUAL: NOT AT ALL
2. FEELING DOWN, DEPRESSED OR HOPELESS: SEVERAL DAYS
5. POOR APPETITE OR OVEREATING: SEVERAL DAYS
3. TROUBLE FALLING OR STAYING ASLEEP OR SLEEPING TOO MUCH: SEVERAL DAYS
6. FEELING BAD ABOUT YOURSELF - OR THAT YOU ARE A FAILURE OR HAVE LET YOURSELF OR YOUR FAMILY DOWN: NOT AT ALL
1. LITTLE INTEREST OR PLEASURE IN DOING THINGS: MORE THAN HALF THE DAYS
4. FEELING TIRED OR HAVING LITTLE ENERGY: SEVERAL DAYS
9. THOUGHTS THAT YOU WOULD BE BETTER OFF DEAD, OR OF HURTING YOURSELF: NOT AT ALL
SUM OF ALL RESPONSES TO PHQ QUESTIONS 1-9: 6
10. IF YOU CHECKED OFF ANY PROBLEMS, HOW DIFFICULT HAVE THESE PROBLEMS MADE IT FOR YOU TO DO YOUR WORK, TAKE CARE OF THINGS AT HOME, OR GET ALONG WITH OTHER PEOPLE: NOT DIFFICULT AT ALL

## 2024-06-13 ASSESSMENT — ENCOUNTER SYMPTOMS: DEPRESSION: 1

## 2024-06-13 NOTE — PROGRESS NOTES
"Subjective   Patient ID: Jf Watts \"ZORAN" is a 89 y.o. male who presents for Follow-up and Depression.  HPI  Rash on buproprion-allergy  Dr Bull  Derm prednisone   Spirits better   Diarrhea imodium   Triny   Family in [facility]     Review of Systems   Constitutional:  Positive for activity change and fatigue. Negative for unexpected weight change.   Respiratory: Negative.     Cardiovascular: Negative.    Gastrointestinal: Negative.    Skin:  Positive for rash.   Neurological:  Negative for light-headedness and headaches.   Psychiatric/Behavioral:  Positive for dysphoric mood and sleep disturbance. Negative for suicidal ideas. The patient is not nervous/anxious.        Objective   Physical Exam  Constitutional:       Appearance: Normal appearance.   Cardiovascular:      Rate and Rhythm: Normal rate and regular rhythm.      Heart sounds: Normal heart sounds.   Pulmonary:      Effort: Pulmonary effort is normal.      Breath sounds: Normal breath sounds.   Abdominal:      General: Abdomen is flat. Bowel sounds are normal.      Palpations: Abdomen is soft.   Musculoskeletal:      Right lower leg: Edema present.      Left lower leg: Edema present.      Comments: Compression stockings, nontender   Skin:     Findings: Lesion present.      Comments: Multiple keratosis and nevi without discrete rash/hives   Neurological:      General: No focal deficit present.      Mental Status: He is alert.      Gait: Gait abnormal.      Comments: Ambulatory with cane   Psychiatric:         Attention and Perception: Attention normal.         Mood and Affect: Mood is not anxious or depressed. Affect is not blunt, angry or tearful.         Speech: Speech normal.         Behavior: Behavior is slowed. Behavior is not agitated. Behavior is cooperative.         Thought Content: Thought content normal.         Cognition and Memory: Cognition normal.      Comments: Appears improved        /70   Pulse 68   Resp 16   Wt 90.7 kg (200 lb) "   BMI 31.32 kg/m²     Assessment/Plan     Drug allergy to bupropion noted  Recurrent major depressive disorder clinically improved despite intolerant trial of bupropion, perhaps reinforced with his bismark and family support  Psychiatry referral  Problem List Items Addressed This Visit       Adjustment disorder with depressed mood - Primary    Edema    Severe episode of recurrent major depressive disorder, without psychotic features (Multi)    Drug allergy

## 2024-06-17 ENCOUNTER — APPOINTMENT (OUTPATIENT)
Dept: PRIMARY CARE | Facility: CLINIC | Age: 89
End: 2024-06-17
Payer: COMMERCIAL

## 2024-06-29 DIAGNOSIS — R06.09 DYSPNEA ON EXERTION: ICD-10-CM

## 2024-06-30 VITALS
SYSTOLIC BLOOD PRESSURE: 100 MMHG | HEART RATE: 68 BPM | BODY MASS INDEX: 31.32 KG/M2 | WEIGHT: 200 LBS | RESPIRATION RATE: 16 BRPM | DIASTOLIC BLOOD PRESSURE: 70 MMHG

## 2024-06-30 PROBLEM — Z88.9 DRUG ALLERGY: Status: ACTIVE | Noted: 2024-06-30

## 2024-06-30 ASSESSMENT — ENCOUNTER SYMPTOMS
NERVOUS/ANXIOUS: 0
ACTIVITY CHANGE: 1
DYSPHORIC MOOD: 1
HEADACHES: 0
FATIGUE: 1
GASTROINTESTINAL NEGATIVE: 1
SLEEP DISTURBANCE: 1
UNEXPECTED WEIGHT CHANGE: 0
RESPIRATORY NEGATIVE: 1
LIGHT-HEADEDNESS: 0
CARDIOVASCULAR NEGATIVE: 1

## 2024-07-01 RX ORDER — METOPROLOL SUCCINATE 100 MG/1
100 TABLET, EXTENDED RELEASE ORAL 2 TIMES DAILY
Qty: 180 TABLET | Refills: 3 | Status: SHIPPED | OUTPATIENT
Start: 2024-07-01

## 2024-07-08 ENCOUNTER — APPOINTMENT (OUTPATIENT)
Dept: BEHAVIORAL HEALTH | Facility: CLINIC | Age: 89
End: 2024-07-08
Payer: COMMERCIAL

## 2024-07-08 VITALS
HEART RATE: 68 BPM | DIASTOLIC BLOOD PRESSURE: 69 MMHG | BODY MASS INDEX: 31.61 KG/M2 | WEIGHT: 201.4 LBS | TEMPERATURE: 97.7 F | HEIGHT: 67 IN | SYSTOLIC BLOOD PRESSURE: 149 MMHG

## 2024-07-08 DIAGNOSIS — F43.21 ADJUSTMENT DISORDER WITH DEPRESSED MOOD: ICD-10-CM

## 2024-07-08 DIAGNOSIS — F41.9 ANXIETY DISORDER, UNSPECIFIED TYPE: ICD-10-CM

## 2024-07-08 PROCEDURE — 3077F SYST BP >= 140 MM HG: CPT | Performed by: PSYCHIATRY & NEUROLOGY

## 2024-07-08 PROCEDURE — 3078F DIAST BP <80 MM HG: CPT | Performed by: PSYCHIATRY & NEUROLOGY

## 2024-07-08 PROCEDURE — 99214 OFFICE O/P EST MOD 30 MIN: CPT | Performed by: PSYCHIATRY & NEUROLOGY

## 2024-07-08 PROCEDURE — 1036F TOBACCO NON-USER: CPT | Performed by: PSYCHIATRY & NEUROLOGY

## 2024-07-08 PROCEDURE — 1126F AMNT PAIN NOTED NONE PRSNT: CPT | Performed by: PSYCHIATRY & NEUROLOGY

## 2024-07-08 PROCEDURE — 90833 PSYTX W PT W E/M 30 MIN: CPT | Performed by: PSYCHIATRY & NEUROLOGY

## 2024-07-08 RX ORDER — MIRTAZAPINE 15 MG/1
15 TABLET, FILM COATED ORAL NIGHTLY
Qty: 90 TABLET | Refills: 0 | Status: SHIPPED | OUTPATIENT
Start: 2024-07-08 | End: 2024-10-06

## 2024-07-08 ASSESSMENT — PAIN SCALES - GENERAL: PAINLEVEL: 0-NO PAIN

## 2024-07-08 NOTE — PATIENT INSTRUCTIONS
1) increase  remeron to 15  mg in the evenings,  trazadone 25-50 mg at night as needed for sleep  2) I will see you in about 1 month     -If there are any thoughts of harming your self, harming others, concerning worsening of your mental health symptoms or concerning medication side effects, please call [phone] or reports to the nearest emergency room.

## 2024-07-08 NOTE — PROGRESS NOTES
Outpatient Psychiatry        Reason for Visit: follow up for depression, unspecified, remission ; insomnia ; bereavement, uncomplicated .  -last week he was not able to walk due to rt ankle pain, getting better.   -his pcp is concerned that he is depressed. Pcp started wellbutrin which he was allergic to     HPI: 89 y/o  male who presents for F/U regarding depression and insomnia. . Sees children once per week.   Tries to stay active and socializes. He attends book club and play writing group. He acted in plays/musical  in the past. States at times he has to push himself to do things as he just does not feel like doing much (50% of the time and seems he has to push himself more in the past few months)   Feels sad daily as he is alone, misses wife, and needs to decide if he should move to [facility]. Facing decline in physical abilities/stamina.   Some nights it can be hard to fall asleeop, often awakens by 1 am. He is not feeling groggy once he gets up in the am.   Goes out to dinner one to 2 times per week. attends book club monthly. conducts a grief session at [employer] weekly , goes to coffee get together weekly in his building.   Attends [employer].   no death wish or SI.   There have been no hallucinations.   He is taking mirtazapine, melatonin nightly and trazadone.   nocturia awakens him at night.   USes a cane , gets tired when walking.   no new health issues. No falls  He has had difficulty diuresing      past psych h/o: previously following with Angelia Cuenca NP. Began to see her after inpatient admission [place] 2017 for depression and SI. No other psych tx. He has been on celexa, seroquel, trazadone, stoppped cymbalta 30 mg 9/2018. Took ambien for 10 years until 2017.      chem dep: no substance abuse h/o, but has one drink per night (1.5 ounces of liquor or 8 oz of wine).      family psych h/o: mom- alcoholic, brother - alcohol abuse and his 2 kids drink heavily.      past med h/o: glaucoma, HTN, GERD, HLD,  h/o prostate CA, cardiomyopathy, B/L TKA   all: sulfa, PCN, wellbutrin (hives)      social h/o: raised by mom and step-dad. Finished hs and straight to college - [employer]. Worked in , retired .  X2 . Currently  43 years. He has 3 bio sons- one son  from substance abuse in . One step daughter. All living children are in the area and they have close relationships. He sees his grandchildren at family gatherings. He loves sports and curls. . He enjoys activities. 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 Played football along with other sports.          Current Medications:    Current Outpatient Medications:     aspirin 81 mg EC tablet, Take 1 tablet (81 mg) by mouth once daily., Disp: , Rfl:     furosemide (Lasix) 20 mg tablet, Take 5 tablets (100 mg) by mouth once daily., Disp: 450 tablet, Rfl: 1    isosorbide mononitrate ER (Imdur) 60 mg 24 hr tablet, TAKE 1 TABLET BY MOUTH ONCE  DAILY, Disp: 90 tablet, Rfl: 3    losartan (Cozaar) 25 mg tablet, TAKE 1 TABLET BY MOUTH DAILY, Disp: 90 tablet, Rfl: 3    melatonin 10 mg capsule, Take 1 capsule (10 mg) by mouth once daily at bedtime., Disp: , Rfl:     metoprolol succinate XL (Toprol-XL) 100 mg 24 hr tablet, TAKE 1 TABLET BY MOUTH TWICE  DAILY, Disp: 180 tablet, Rfl: 3    mirtazapine (Remeron) 7.5 mg tablet, Take 1 tablet (7.5 mg) by mouth once daily at bedtime., Disp: 90 tablet, Rfl: 1    multivit-iron-minerals-folic acid (Centrum Silver) 0.4 mg-300 mcg- 250 mcg tab, Take 1 tablet by mouth once daily., Disp: , Rfl:     nepafenac (Nevanac) 0.1 % ophthalmic suspension, Administer 1 drop into affected eye(s) 3 times a day., Disp: , Rfl:     omeprazole (PriLOSEC) 20 mg DR capsule, TAKE 1 CAPSULE BY MOUTH DAILY IN THE MORNING BEFORE BREAKFAST, Disp: 90 capsule, Rfl: 1    simvastatin (Zocor) 20 mg tablet, TAKE 1 TABLET BY MOUTH IN THE  EVENING, Disp: 90 tablet, Rfl: 3    spironolactone (Aldactone) 25 mg tablet, TAKE 1 TABLET BY MOUTH DAILY,  Disp: 90 tablet, Rfl: 3    timolol (Timoptic) 0.5 % ophthalmic solution, Administer into affected eye(s) every 12 hours., Disp: , Rfl:     traZODone (Desyrel) 50 mg tablet, TAKE 1 TABLET BY MOUTH ONCE  DAILY AT BEDTIME, Disp: 90 tablet, Rfl: 0  Medical History:  Past Medical History:   Diagnosis Date    Acquired trigger finger 06/06/2023    Acute bronchitis 06/06/2023    Acute depression 06/06/2023    Anxiety in acute stress reaction 06/06/2023    Bacterial pneumonia 06/06/2023    Blepharitis 06/06/2023    Chronic otitis media of left ear 06/06/2023    Conjunctivitis 06/06/2023    Cough 06/06/2023    Disease due to severe acute respiratory syndrome coronavirus 2 (SARS-CoV-2) 06/06/2023    Dysphonia 05/15/2019    Dysphonia    Encounter for debridement of left postmastoidectomy cavity 06/06/2023    Exposure to COVID-19 virus 06/06/2023    Fever of unknown origin 06/06/2023    GERD (gastroesophageal reflux disease) 06/20/2023    Heart failure with preserved left ventricular function (HFpEF) (Multi) 06/06/2023    Hip pain, left 06/06/2023    Hyperlipidemia 06/20/2023    Impacted cerumen of left ear 06/06/2023    Lightheadedness 06/06/2023    Middle ear infection, chronic 06/06/2023    Osteoarthritis of knee, unspecified 05/16/2016    Osteoarthritis of knee    Other conditions influencing health status     Prostate Cancer    Other conditions influencing health status     Colonoscopy (Fiberoptic)    Other specified postprocedural states 11/03/2018    Status post cardiac catheterization    Personal history of other diseases of the circulatory system 02/15/2022    History of cardiomyopathy    Personal history of other diseases of the circulatory system     History of mitral valve disorder    Personal history of other diseases of the circulatory system     History of hypertension    Personal history of other diseases of the digestive system     History of esophageal reflux    Personal history of other endocrine, nutritional  and metabolic disease     History of hyperlipidemia    Senile ectropion of left lower eyelid 03/07/2018    Formatting of this note might be different from the original. Added automatically from request for surgery [id number]    Severe single current episode of major depressive disorder, with psychotic features (Multi) 06/06/2023    Spontaneous rupture of extensor tendons, left hand 12/15/2017    Nontraumatic rupture of sagittal band of extensor tendon of left upper extremity    Spontaneous rupture of extensor tendons, right hand 11/17/2017    Nontraumatic rupture of sagittal band of extensor tendon of right upper extremity    Unspecified systolic (congestive) heart failure (Multi) 09/26/2022    HFrEF (heart failure with reduced ejection fraction)     Surgical History:  Past Surgical History:   Procedure Laterality Date    ANKLE ARTHROSCOPY W/ OPEN REPAIR  10/23/2013    Ankle Repair    APPENDECTOMY  10/23/2013    Appendectomy    CATARACT EXTRACTION  10/23/2013    Cataract Surgery    OTHER SURGICAL HISTORY  10/23/2013    Mastoidectomy    TOTAL KNEE ARTHROPLASTY  02/04/2014    Total Knee Arthroplasty     Family History:  Family History   Problem Relation Name Age of Onset    Alcohol abuse Mother      No Known Problems Father      Heart disease Brother          Age 53    Other (Age 95) Brother      No Known Problems Daughter      No Known Problems Son      No Known Problems Son      Other (Drug overdose) Son       Social History:  Social History     Socioeconomic History    Marital status:      Spouse name: Not on file    Number of children: Not on file    Years of education: Not on file    Highest education level: Not on file   Occupational History    Not on file   Tobacco Use    Smoking status: Never     Passive exposure: Never    Smokeless tobacco: Never   Substance and Sexual Activity    Alcohol use: Yes     Comment: rare    Drug use: Never    Sexual activity: Not on file   Other Topics Concern    Not on file  "  Social History Narrative    Not on file     Social Determinants of Health     Financial Resource Strain: Not on file   Food Insecurity: Not on file   Transportation Needs: Not on file   Physical Activity: Not on file   Stress: Not on file   Social Connections: Not on file   Intimate Partner Violence: Not on file   Housing Stability: Not on file       Medical Review Of Systems:  Pertinent items are noted in HPI.    Psychiatric Review Of Systems:  Psychiatric ROS - Adult  Anxiety: Negative  Depression: sad at times due to loss of wife  Delirium: negative  Psychosis: negative  Jenn: negative  Safety Issues: none    Disordered Eating Symptoms:n/a  Inattentive Symptoms:n/a   Hyperactive/Impulsive Symptoms:n/a    Trauma Symptoms:n/a       There were no vitals taken for this visit.     LABS  Lab Results   Component Value Date    TSH 3.92 01/15/2024    25 60.2 04/08/2022    DXCCECYP40 417 10/25/2022    HDL 43.0 01/15/2024      Lab Results   Component Value Date    CHOL 137 01/15/2024    CHOL 145 06/12/2023    CHOL 145 06/09/2022     Lab Results   Component Value Date    HDL 43.0 01/15/2024    HDL 46.4 06/12/2023    HDL 45.6 06/09/2022     Lab Results   Component Value Date    LDLCALC 58 01/15/2024     Lab Results   Component Value Date    TRIG 181 (H) 01/15/2024    TRIG 138 06/12/2023    TRIG 129 06/09/2022       Chemistry    Lab Results   Component Value Date/Time     02/09/2024 1436    K 4.9 02/09/2024 1436     02/09/2024 1436    CO2 27 02/09/2024 1436    BUN 46 (H) 02/09/2024 1436    CREATININE 2.14 (H) 02/09/2024 1436    Lab Results   Component Value Date/Time    CALCIUM 9.9 02/09/2024 1436    ALKPHOS 62 01/04/2023 0000    AST 14 01/15/2024 1542    ALT 12 01/15/2024 1542    BILITOT 0.6 01/04/2023 0000          No components found for: \"CHOLHDL\"         2/19/2024     2:14 PM 3/11/2024     1:00 PM 3/18/2024     1:13 PM 5/17/2024     1:26 PM 5/20/2024    11:50 AM 6/13/2024    11:20 AM 7/8/2024     1:55 PM " "  Vitals   Systolic 91 148 105 89 120 100 149   Diastolic 51 73 67 54 70 70 69   Heart Rate 69 86 65 59 70 68 68   Temp  36.2 °C (97.2 °F) 36.2 °C (97.2 °F)    36.5 °C (97.7 °F)   Resp     16 16    Height (in) 1.727 m (5' 8\") 1.727 m (5' 8\")  1.702 m (5' 7\")   1.702 m (5' 7\")   Weight (lb) 204.1 196 201.8 202.4  200 201.4   BMI 31.03 kg/m2 29.8 kg/m2 30.68 kg/m2 31.7 kg/m2  31.32 kg/m2 31.54 kg/m2   BSA (m2) 2.11 m2 2.07 m2 2.1 m2 2.08 m2  2.07 m2 2.08 m2   Visit Report Report Report Report Report Report Report Report       Mental Status Examination  General Appearance: Well groomed, appropriate eye contact.  Gait/Station: Within normal limits  Speech: uses cane for stability   Mood: more down that when last seen   Affect: Euthymic, full-range  Thought Process: Linear, goal directed  Thought Associations: No loosening of associations  Thought Content: normal  Perception: No perceptual abnormalities noted  Level of Consciousness: Alert  Orientation: Alert and oriented to person, place, time and situation  Attention and Concentration: Intact  Recent Memory: Intact as evidenced by ability to recall details from the past 24 hours   Remote Memory: Intact as evidenced by ability to recall previous medical issues   Executive function: Intact  Language: Naming intact  Fund of knowledge: Good  Insight: intact  Judgment: intact         Assessment/Plan   This 89 y/o male has a h/o of one depressive episode (jan 2017) in the setting of low Na and stress. He has not had previous mood struggles- currently appropriate bereavement regarding loss of wife. Since last seen feeling more down and times , notes he has to push himself more to do activities and is facing issues related to aging.  Circumstances seem to be fueling mild depressive symptoms that are not unusual in his situation. PCP recently added wellbutrin which he was allergic to.   He is now taking remeron, trazadone, melatonin . Sleep is pretty good most nights, but there " are some episodes of middle insomnia. . No hypnopompic hallucinations.  There are no acute safety issues such as SI/HI.       dx: depression, unspecified,  ; insomnia ; bereavement, uncomplicated      1) increase mirtazapine to 15  mg at night and evaluate for mood improvement,  trazadone 25-50 mg at night, melatonin used as needed,  taking balance of nature vitamins  2) f/u 3 months or earlier if needed   3) labs reviewed  4) safety plan reviewed - if there are thoughts of self harm, concerning side effects or significant symptoms worsening report to the ER, call [phone]. [id number]- [phone] minutes -supportive counseling provided- Jf admits to feeling down and lonely most days, has been having to push himself more to do things. Facing aging is difficult and he realizes that stamina is declining and it is hard to do everything independently. Considering moving to [facility], leaving his apartment will make him feel like he will lose his wife again and they shared their apartment. He notes when he is worried about moving and his affairs it can be hard to go to sleep. He is working to make decisions so that he can move on with his life.       Review with patient: Treatment plan reviewed with the patient.  Medication risks/benefit reviewed with the patient  Psychiatric Risk Assessment  Violence Risk Assessment: male  Acute Risk of Harm to Others is Considered: low   Suicide Risk Assessment: age > 65 yrs old  Protective Factors against Suicide: adherence to  treatment, hopefulness/future orientation, moral objections to suicide, positive family relationships, Mosque affiliation/spirituality, sense of responsibility toward family, social support/connectedness, and strong coping skills  Acute Risk of Harm to Self is Considered: omari Hoyt,

## 2024-07-25 DIAGNOSIS — K22.70 BARRETT'S ESOPHAGUS WITHOUT DYSPLASIA: ICD-10-CM

## 2024-07-25 RX ORDER — OMEPRAZOLE 20 MG/1
20 CAPSULE, DELAYED RELEASE ORAL
Qty: 90 CAPSULE | Refills: 0 | Status: SHIPPED | OUTPATIENT
Start: 2024-07-25

## 2024-08-05 ENCOUNTER — APPOINTMENT (OUTPATIENT)
Dept: BEHAVIORAL HEALTH | Facility: CLINIC | Age: 89
End: 2024-08-05
Payer: COMMERCIAL

## 2024-08-05 VITALS
HEART RATE: 62 BPM | BODY MASS INDEX: 31.45 KG/M2 | SYSTOLIC BLOOD PRESSURE: 109 MMHG | DIASTOLIC BLOOD PRESSURE: 62 MMHG | WEIGHT: 200.8 LBS

## 2024-08-05 DIAGNOSIS — F33.2 SEVERE EPISODE OF RECURRENT MAJOR DEPRESSIVE DISORDER, WITHOUT PSYCHOTIC FEATURES (MULTI): ICD-10-CM

## 2024-08-05 DIAGNOSIS — F43.21 ADJUSTMENT DISORDER WITH DEPRESSED MOOD: ICD-10-CM

## 2024-08-05 DIAGNOSIS — F41.9 ANXIETY DISORDER, UNSPECIFIED TYPE: ICD-10-CM

## 2024-08-05 PROCEDURE — 3078F DIAST BP <80 MM HG: CPT | Performed by: PSYCHIATRY & NEUROLOGY

## 2024-08-05 PROCEDURE — 90833 PSYTX W PT W E/M 30 MIN: CPT | Performed by: PSYCHIATRY & NEUROLOGY

## 2024-08-05 PROCEDURE — 99214 OFFICE O/P EST MOD 30 MIN: CPT | Performed by: PSYCHIATRY & NEUROLOGY

## 2024-08-05 PROCEDURE — 3074F SYST BP LT 130 MM HG: CPT | Performed by: PSYCHIATRY & NEUROLOGY

## 2024-08-05 NOTE — PROGRESS NOTES
Outpatient Psychiatry        Reason for Visit: follow up for depression, unspecified, remission ; insomnia ; bereavement, uncomplicated .      HPI: 89 y/o  male who presents for F/U regarding depression and insomnia. . Sees children once per week.  At last visit mirtazepine increased. Jf reports since last seen he has had more anxiety that he related to upcoming. He is worried about logistics of moving , has not yet sold his home. Moving to [place]. The situation has made him feel restless. He likes to be able to plan ahead and his daughter is managing his move and not being in control is anxiety provoking.   Tries to stay active and socializes. He attends book club and play writing group. He acted in plays/musical  in the past.   Feels sad daily as he is alone, misses wife, . Facing decline in physical abilities/stamina.   Some nights it can be hard to fall asleeop, often awakens by 1 am. He is not feeling groggy once he gets up in the am.   Goes out to dinner one to 2 times per week. attends book club monthly. conducts a grief session at [employer] weekly , goes to coffee get together weekly in his building.   Attends [employer].   no death wish or SI.   There have been no hallucinations.   He is taking mirtazapine, melatonin nightly and trazadone.   nocturia awakens him at night.   USes a cane , gets tired when walking.   no new health issues. No falls     past psych h/o: previously following with Angelia Cuenca NP. Began to see her after inpatient admission [facility] 2017 for depression and SI. No other psych tx. He has been on celexa, seroquel, trazadone, stoppped cymbalta 30 mg 9/2018. Took ambien for 10 years until 2017.      chem dep: no substance abuse h/o, but has one drink per night (1.5 ounces of liquor or 8 oz of wine).      family psych h/o: mom- alcoholic, brother - alcohol abuse and his 2 kids drink heavily.      past med h/o: glaucoma, HTN, GERD, HLD, h/o prostate CA, cardiomyopathy, B/L TKA    all: sulfa, PCN, wellbutrin (hives)      social h/o: raised by mom and step-dad. Finished hs and straight to college - [employer]. Worked in , retired .  X2 . Currently  43 years. He has 3 bio sons- one son  from substance abuse in . One step daughter. All living children are in the area and they have close relationships. He sees his grandchildren at family gatherings. He loves sports and curls. . He enjoys activities. 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 Played football along with other sports.          Current Medications:    Current Outpatient Medications:     aspirin 81 mg EC tablet, Take 1 tablet (81 mg) by mouth once daily., Disp: , Rfl:     furosemide (Lasix) 20 mg tablet, Take 5 tablets (100 mg) by mouth once daily., Disp: 450 tablet, Rfl: 1    isosorbide mononitrate ER (Imdur) 60 mg 24 hr tablet, TAKE 1 TABLET BY MOUTH ONCE  DAILY, Disp: 90 tablet, Rfl: 3    losartan (Cozaar) 25 mg tablet, TAKE 1 TABLET BY MOUTH DAILY, Disp: 90 tablet, Rfl: 3    melatonin 10 mg capsule, Take 1 capsule (10 mg) by mouth once daily at bedtime., Disp: , Rfl:     metoprolol succinate XL (Toprol-XL) 100 mg 24 hr tablet, TAKE 1 TABLET BY MOUTH TWICE  DAILY, Disp: 180 tablet, Rfl: 3    mirtazapine (Remeron) 15 mg tablet, Take 1 tablet (15 mg) by mouth once daily at bedtime., Disp: 90 tablet, Rfl: 0    mirtazapine (Remeron) 7.5 mg tablet, Take 1 tablet (7.5 mg) by mouth once daily at bedtime., Disp: 90 tablet, Rfl: 1    multivit-iron-minerals-folic acid (Centrum Silver) 0.4 mg-300 mcg- 250 mcg tab, Take 1 tablet by mouth once daily., Disp: , Rfl:     nepafenac (Nevanac) 0.1 % ophthalmic suspension, Administer 1 drop into affected eye(s) 3 times a day., Disp: , Rfl:     omeprazole (PriLOSEC) 20 mg DR capsule, Take 1 capsule (20 mg) by mouth once daily in the morning. Take before meals. NEEDS TO MAKE AN APPOINTMENT., Disp: 90 capsule, Rfl: 0    simvastatin (Zocor) 20 mg tablet, TAKE 1 TABLET  BY MOUTH IN THE  EVENING, Disp: 90 tablet, Rfl: 3    spironolactone (Aldactone) 25 mg tablet, TAKE 1 TABLET BY MOUTH DAILY, Disp: 90 tablet, Rfl: 3    timolol (Timoptic) 0.5 % ophthalmic solution, Administer into affected eye(s) every 12 hours., Disp: , Rfl:     traZODone (Desyrel) 50 mg tablet, TAKE 1 TABLET BY MOUTH ONCE  DAILY AT BEDTIME, Disp: 90 tablet, Rfl: 0  Medical History:  Past Medical History:   Diagnosis Date    Acquired trigger finger 06/06/2023    Acute bronchitis 06/06/2023    Acute depression 06/06/2023    Anxiety in acute stress reaction 06/06/2023    Bacterial pneumonia 06/06/2023    Blepharitis 06/06/2023    Chronic otitis media of left ear 06/06/2023    Conjunctivitis 06/06/2023    Cough 06/06/2023    Disease due to severe acute respiratory syndrome coronavirus 2 (SARS-CoV-2) 06/06/2023    Dysphonia 05/15/2019    Dysphonia    Encounter for debridement of left postmastoidectomy cavity 06/06/2023    Exposure to COVID-19 virus 06/06/2023    Fever of unknown origin 06/06/2023    GERD (gastroesophageal reflux disease) 06/20/2023    Heart failure with preserved left ventricular function (HFpEF) (Multi) 06/06/2023    Hip pain, left 06/06/2023    Hyperlipidemia 06/20/2023    Impacted cerumen of left ear 06/06/2023    Lightheadedness 06/06/2023    Middle ear infection, chronic 06/06/2023    Osteoarthritis of knee, unspecified 05/16/2016    Osteoarthritis of knee    Other conditions influencing health status     Prostate Cancer    Other conditions influencing health status     Colonoscopy (Fiberoptic)    Other specified postprocedural states 11/03/2018    Status post cardiac catheterization    Personal history of other diseases of the circulatory system 02/15/2022    History of cardiomyopathy    Personal history of other diseases of the circulatory system     History of mitral valve disorder    Personal history of other diseases of the circulatory system     History of hypertension    Personal history of  other diseases of the digestive system     History of esophageal reflux    Personal history of other endocrine, nutritional and metabolic disease     History of hyperlipidemia    Senile ectropion of left lower eyelid 03/07/2018    Formatting of this note might be different from the original. Added automatically from request for surgery [id number]    Severe single current episode of major depressive disorder, with psychotic features (Multi) 06/06/2023    Spontaneous rupture of extensor tendons, left hand 12/15/2017    Nontraumatic rupture of sagittal band of extensor tendon of left upper extremity    Spontaneous rupture of extensor tendons, right hand 11/17/2017    Nontraumatic rupture of sagittal band of extensor tendon of right upper extremity    Unspecified systolic (congestive) heart failure (Multi) 09/26/2022    HFrEF (heart failure with reduced ejection fraction)     Surgical History:  Past Surgical History:   Procedure Laterality Date    ANKLE ARTHROSCOPY W/ OPEN REPAIR  10/23/2013    Ankle Repair    APPENDECTOMY  10/23/2013    Appendectomy    CATARACT EXTRACTION  10/23/2013    Cataract Surgery    OTHER SURGICAL HISTORY  10/23/2013    Mastoidectomy    TOTAL KNEE ARTHROPLASTY  02/04/2014    Total Knee Arthroplasty     Family History:  Family History   Problem Relation Name Age of Onset    Alcohol abuse Mother      No Known Problems Father      Heart disease Brother          Age 53    Other (Age 95) Brother      No Known Problems Daughter      No Known Problems Son      No Known Problems Son      Other (Drug overdose) Son       Social History:  Social History     Socioeconomic History    Marital status:      Spouse name: Not on file    Number of children: Not on file    Years of education: Not on file    Highest education level: Not on file   Occupational History    Not on file   Tobacco Use    Smoking status: Never     Passive exposure: Never    Smokeless tobacco: Never   Substance and Sexual Activity     "Alcohol use: Yes     Comment: rare    Drug use: Never    Sexual activity: Not on file   Other Topics Concern    Not on file   Social History Narrative    Not on file     Social Determinants of Health     Financial Resource Strain: Not on file   Food Insecurity: Not on file   Transportation Needs: Not on file   Physical Activity: Not on file   Stress: Not on file   Social Connections: Not on file   Intimate Partner Violence: Not on file   Housing Stability: Not on file       Medical Review Of Systems:  Pertinent items are noted in HPI.    Psychiatric Review Of Systems:  Psychiatric ROS - Adult  Anxiety: Negative  Depression: sad at times due to loss of wife  Delirium: negative  Psychosis: negative  Jenn: negative  Safety Issues: none    Disordered Eating Symptoms:n/a  Inattentive Symptoms:n/a   Hyperactive/Impulsive Symptoms:n/a    Trauma Symptoms:n/a       There were no vitals taken for this visit.     LABS  Lab Results   Component Value Date    TSH 3.92 01/15/2024    25 60.2 04/08/2022    HAYEAEKN48 417 10/25/2022    HDL 43.0 01/15/2024      Lab Results   Component Value Date    CHOL 137 01/15/2024    CHOL 145 06/12/2023    CHOL 145 06/09/2022     Lab Results   Component Value Date    HDL 43.0 01/15/2024    HDL 46.4 06/12/2023    HDL 45.6 06/09/2022     Lab Results   Component Value Date    LDLCALC 58 01/15/2024     Lab Results   Component Value Date    TRIG 181 (H) 01/15/2024    TRIG 138 06/12/2023    TRIG 129 06/09/2022       Chemistry    Lab Results   Component Value Date/Time     02/09/2024 1436    K 4.9 02/09/2024 1436     02/09/2024 1436    CO2 27 02/09/2024 1436    BUN 46 (H) 02/09/2024 1436    CREATININE 2.14 (H) 02/09/2024 1436    Lab Results   Component Value Date/Time    CALCIUM 9.9 02/09/2024 1436    ALKPHOS 62 01/04/2023 0000    AST 14 01/15/2024 1542    ALT 12 01/15/2024 1542    BILITOT 0.6 01/04/2023 0000          No components found for: \"CHOLHDL\"         2/19/2024     2:14 PM 3/11/2024 " "    1:00 PM 3/18/2024     1:13 PM 5/17/2024     1:26 PM 5/20/2024    11:50 AM 6/13/2024    11:20 AM 7/8/2024     1:55 PM   Vitals   Systolic 91 148 105 89 120 100 149   Diastolic 51 73 67 54 70 70 69   Heart Rate 69 86 65 59 70 68 68   Temp  36.2 °C (97.2 °F) 36.2 °C (97.2 °F)    36.5 °C (97.7 °F)   Resp     16 16    Height (in) 1.727 m (5' 8\") 1.727 m (5' 8\")  1.702 m (5' 7\")   1.702 m (5' 7\")   Weight (lb) 204.1 196 201.8 202.4  200 201.4   BMI 31.03 kg/m2 29.8 kg/m2 30.68 kg/m2 31.7 kg/m2  31.32 kg/m2 31.54 kg/m2   BSA (m2) 2.11 m2 2.07 m2 2.1 m2 2.08 m2  2.07 m2 2.08 m2   Visit Report Report Report Report Report Report Report Report       Mental Status Examination  General Appearance: Well groomed, appropriate eye contact.  Gait/Station: Within normal limits  Speech: uses cane for stability   Mood: anxious about upcoming move   Affect: Euthymic, full-range  Thought Process: Linear, goal directed  Thought Associations: No loosening of associations  Thought Content: normal  Perception: No perceptual abnormalities noted  Level of Consciousness: Alert  Orientation: Alert and oriented to person, place, time and situation  Attention and Concentration: Intact  Recent Memory: Intact as evidenced by ability to recall details from the past 24 hours   Remote Memory: Intact as evidenced by ability to recall previous medical issues   Executive function: Intact  Language: Naming intact  Fund of knowledge: Good  Insight: intact  Judgment: intact         Assessment/Plan   This 89 y/o male has a h/o of one depressive episode (jan 2017) in the setting of low Na and stress. He had not previous mood struggles- currently appropriate bereavement regarding loss of wife.  He reports mood is ok, but he has anxiety related to upcoming .  He is now taking remeron, trazadone, melatonin . Sleep is pretty good most nights, but there are some episodes of middle insomnia. . No hypnopompic hallucinations.  There are no acute safety issues " such as SI/HI.       dx: depression, unspecified,  ; insomnia ; bereavement, uncomplicated      1) continue mirtazapine  15  mg at night and evaluate for mood improvement,  trazadone 25-50 mg at night, melatonin used as needed,  taking balance of nature vitamins  2) follow up  8 weeks or earlier if needed   3) labs reviewed  4) safety plan reviewed - if there are thoughts of self harm, concerning side effects or significant symptoms worsening report to the ER, call [phone]. [phone]- 17 minutes -supportive counseling provided- Jf admits to feeling anxious about upcoming move. His daughter is managing his move to [place]. He realizes that he has always been a planner and not being part of move plan is anxiety provoking for him. He also has a sense of loss of independence leaving him apartment for [employer].  Facing aging is difficult and he realizes that stamina is declining and it is hard to do everything independently.   -leaving the apartment he shared with his wife is emotionally difficult       Review with patient: Treatment plan reviewed with the patient.  Medication risks/benefit reviewed with the patient  Psychiatric Risk Assessment  Violence Risk Assessment: male  Acute Risk of Harm to Others is Considered: low   Suicide Risk Assessment: age > 65 yrs old  Protective Factors against Suicide: adherence to  treatment, hopefulness/future orientation, moral objections to suicide, positive family relationships, Sikh affiliation/spirituality, sense of responsibility toward family, social support/connectedness, and strong coping skills  Acute Risk of Harm to Self is Considered: omari Hoyt, DO

## 2024-08-05 NOTE — PATIENT INSTRUCTIONS
1) continue  remeron 15  mg in the evenings,  trazadone 25-50 mg at night as needed for sleep  2) I will see you in about 1-2  months    -If there are any thoughts of harming your self, harming others, concerning worsening of your mental health symptoms or concerning medication side effects, please call [phone] or reports to the nearest emergency room.

## 2024-08-06 ENCOUNTER — APPOINTMENT (OUTPATIENT)
Dept: BEHAVIORAL HEALTH | Facility: CLINIC | Age: 89
End: 2024-08-06
Payer: COMMERCIAL

## 2024-08-08 ENCOUNTER — APPOINTMENT (OUTPATIENT)
Dept: PRIMARY CARE | Facility: CLINIC | Age: 89
End: 2024-08-08
Payer: COMMERCIAL

## 2024-08-08 DIAGNOSIS — C61 ADENOCARCINOMA OF PROSTATE (MULTI): ICD-10-CM

## 2024-08-08 DIAGNOSIS — R60.0 BILATERAL LOWER EXTREMITY EDEMA: ICD-10-CM

## 2024-08-08 DIAGNOSIS — I50.9 CONGESTIVE HEART FAILURE, UNSPECIFIED HF CHRONICITY, UNSPECIFIED HEART FAILURE TYPE (MULTI): ICD-10-CM

## 2024-08-08 DIAGNOSIS — G47.00 INSOMNIA, UNSPECIFIED TYPE: ICD-10-CM

## 2024-08-08 DIAGNOSIS — F43.21 ADJUSTMENT DISORDER WITH DEPRESSED MOOD: Primary | ICD-10-CM

## 2024-08-08 DIAGNOSIS — N18.4 CKD (CHRONIC KIDNEY DISEASE), STAGE IV (MULTI): ICD-10-CM

## 2024-08-08 DIAGNOSIS — I10 PRIMARY HYPERTENSION: ICD-10-CM

## 2024-08-08 DIAGNOSIS — K21.9 GASTROESOPHAGEAL REFLUX DISEASE, UNSPECIFIED WHETHER ESOPHAGITIS PRESENT: ICD-10-CM

## 2024-08-08 DIAGNOSIS — G60.9 IDIOPATHIC PERIPHERAL NEUROPATHY: ICD-10-CM

## 2024-08-08 PROCEDURE — 3078F DIAST BP <80 MM HG: CPT | Performed by: INTERNAL MEDICINE

## 2024-08-08 PROCEDURE — 1123F ACP DISCUSS/DSCN MKR DOCD: CPT | Performed by: INTERNAL MEDICINE

## 2024-08-08 PROCEDURE — 99214 OFFICE O/P EST MOD 30 MIN: CPT | Performed by: INTERNAL MEDICINE

## 2024-08-08 PROCEDURE — 1158F ADVNC CARE PLAN TLK DOCD: CPT | Performed by: INTERNAL MEDICINE

## 2024-08-08 PROCEDURE — 1159F MED LIST DOCD IN RCRD: CPT | Performed by: INTERNAL MEDICINE

## 2024-08-08 PROCEDURE — 3074F SYST BP LT 130 MM HG: CPT | Performed by: INTERNAL MEDICINE

## 2024-08-08 PROCEDURE — 1160F RVW MEDS BY RX/DR IN RCRD: CPT | Performed by: INTERNAL MEDICINE

## 2024-08-08 NOTE — PROGRESS NOTES
"Subjective   Patient ID: Jf Watts \"ZORAN" is a 89 y.o. male who presents for Follow-up.  HPI  Applying for residence at [facility]   No acute health questions, concerns or complaints    Review of Systems   Constitutional: Negative.    Respiratory: Negative.     Cardiovascular: Negative.    Neurological: Negative.        Objective   Physical Exam  Constitutional:       General: He is not in acute distress.     Appearance: Normal appearance.   Cardiovascular:      Rate and Rhythm: Normal rate and regular rhythm.      Heart sounds: Normal heart sounds.   Pulmonary:      Effort: Pulmonary effort is normal.      Breath sounds: Normal breath sounds.   Abdominal:      General: Abdomen is flat. Bowel sounds are normal.      Palpations: Abdomen is soft.   Musculoskeletal:      Right lower leg: Edema present.      Left lower leg: Edema present.      Comments: Compression stockings, nontender   Skin:     Findings: Lesion present.      Comments: Multiple keratosis and nevi without discrete rash/hives   Neurological:      General: No focal deficit present.      Mental Status: He is alert.      Gait: Gait abnormal.      Comments: Ambulatory with cane   Psychiatric:         Attention and Perception: Attention normal.         Mood and Affect: Mood is not anxious or depressed. Affect is not blunt, angry or tearful.         Speech: Speech normal.         Behavior: Behavior is slowed. Behavior is not agitated. Behavior is cooperative.         Thought Content: Thought content normal.         Cognition and Memory: Cognition normal.       /70   Pulse 68   Resp 16   Wt 90.3 kg (199 lb)   BMI 31.17 kg/m²     Data   Psych consult F/U 8/5 anxiety disorder   Reaction to buproprion     Assessment/Plan     Clinically stable and well compensated on current medical therapy. Appropriate for Senior Living residence.  Free of communicable disease.   Has Living Will and DPOA [facility] application form   Problem List Items " Addressed This Visit       Adenocarcinoma of prostate (Multi)    Adjustment disorder with depressed mood - Primary    CHF (congestive heart failure) (Multi)    Relevant Medications    furosemide (Lasix) 20 mg tablet    Esophageal reflux    Idiopathic peripheral neuropathy    Insomnia    Relevant Medications    traZODone (Desyrel) 50 mg tablet    Hypertension    Bilateral lower extremity edema    CKD (chronic kidney disease), stage IV (Multi)

## 2024-08-19 ENCOUNTER — LAB (OUTPATIENT)
Dept: LAB | Facility: LAB | Age: 89
End: 2024-08-19
Payer: MEDICARE

## 2024-08-19 ENCOUNTER — APPOINTMENT (OUTPATIENT)
Dept: NEPHROLOGY | Facility: CLINIC | Age: 89
End: 2024-08-19
Payer: COMMERCIAL

## 2024-08-19 VITALS
BODY MASS INDEX: 30.16 KG/M2 | HEIGHT: 68 IN | HEART RATE: 106 BPM | WEIGHT: 199 LBS | OXYGEN SATURATION: 92 % | TEMPERATURE: 97.3 F | DIASTOLIC BLOOD PRESSURE: 58 MMHG | SYSTOLIC BLOOD PRESSURE: 118 MMHG

## 2024-08-19 DIAGNOSIS — N18.32 STAGE 3B CHRONIC KIDNEY DISEASE (MULTI): ICD-10-CM

## 2024-08-19 DIAGNOSIS — N18.30 STAGE 3 CHRONIC KIDNEY DISEASE, UNSPECIFIED WHETHER STAGE 3A OR 3B CKD (MULTI): Primary | ICD-10-CM

## 2024-08-19 PROBLEM — L97.511 NON-PRESSURE CHRONIC ULCER OF OTHER PART OF RIGHT FOOT LIMITED TO BREAKDOWN OF SKIN (MULTI): Status: ACTIVE | Noted: 2024-08-19

## 2024-08-19 LAB
ALBUMIN SERPL BCP-MCNC: 4 G/DL (ref 3.4–5)
ANION GAP SERPL CALC-SCNC: 15 MMOL/L (ref 10–20)
BUN SERPL-MCNC: 56 MG/DL (ref 6–23)
CALCIUM SERPL-MCNC: 9.4 MG/DL (ref 8.6–10.6)
CHLORIDE SERPL-SCNC: 98 MMOL/L (ref 98–107)
CO2 SERPL-SCNC: 29 MMOL/L (ref 21–32)
CREAT SERPL-MCNC: 2.41 MG/DL (ref 0.5–1.3)
EGFRCR SERPLBLD CKD-EPI 2021: 24 ML/MIN/1.73M*2
GLUCOSE SERPL-MCNC: 113 MG/DL (ref 74–99)
PHOSPHATE SERPL-MCNC: 3.7 MG/DL (ref 2.5–4.9)
POTASSIUM SERPL-SCNC: 5.2 MMOL/L (ref 3.5–5.3)
SODIUM SERPL-SCNC: 137 MMOL/L (ref 136–145)

## 2024-08-19 PROCEDURE — 1036F TOBACCO NON-USER: CPT | Performed by: INTERNAL MEDICINE

## 2024-08-19 PROCEDURE — 82043 UR ALBUMIN QUANTITATIVE: CPT

## 2024-08-19 PROCEDURE — 82570 ASSAY OF URINE CREATININE: CPT

## 2024-08-19 PROCEDURE — 80069 RENAL FUNCTION PANEL: CPT

## 2024-08-19 PROCEDURE — 36415 COLL VENOUS BLD VENIPUNCTURE: CPT

## 2024-08-19 PROCEDURE — 1159F MED LIST DOCD IN RCRD: CPT | Performed by: INTERNAL MEDICINE

## 2024-08-19 PROCEDURE — 99214 OFFICE O/P EST MOD 30 MIN: CPT | Performed by: INTERNAL MEDICINE

## 2024-08-19 PROCEDURE — 3078F DIAST BP <80 MM HG: CPT | Performed by: INTERNAL MEDICINE

## 2024-08-19 PROCEDURE — 3074F SYST BP LT 130 MM HG: CPT | Performed by: INTERNAL MEDICINE

## 2024-08-19 NOTE — PROGRESS NOTES
For follow up, doing well.  Occasionally feels drowsy during the day, SoB when he bends down to wear shoes but not on walking/lying flat  Has been taking Lasix 80 mg daily by himself, does not notice a difference in swelling  No hospitalizations/illness since last visit   BP at home 110/60s  Denies orthostatic symptoms    RoS negative for all other systems except as noted above.    Vitals:    08/19/24 1359   BP: 118/58   Pulse: 106   Temp: 36.3 °C (97.3 °F)   SpO2: 92%     No distress  HEENT:  moist, no pallor  1-2+ edema olvin LE  Breath sounds olvin equal, clear  S1 S2 regular, normal, no rub or murmur  Abdomen soft  AAO x3, non focal    Lab Results   Component Value Date     02/09/2024     01/15/2024     10/20/2023    K 4.9 02/09/2024    K 4.9 01/15/2024    K 4.9 10/20/2023     02/09/2024    CL 97 (L) 01/15/2024    CL 98 10/20/2023    CO2 27 02/09/2024    CO2 32 01/15/2024    CO2 29 10/20/2023    BUN 46 (H) 02/09/2024    BUN 49 (H) 01/15/2024    BUN 37 (H) 10/20/2023    CREATININE 2.14 (H) 02/09/2024    CREATININE 2.22 (H) 01/15/2024    CREATININE 1.87 (H) 10/20/2023    CALCIUM 9.9 02/09/2024    CALCIUM 9.5 01/15/2024    CALCIUM 9.5 10/20/2023    PHOS 3.8 02/09/2024    PHOS 3.9 01/15/2024    PHOS 3.7 10/20/2023    VITD25 46 01/15/2024    VITD25 44 06/09/2022    MICROALBCREA  01/15/2024      Comment:      One or more analytes used in this calculation is outside of the analytical measurement range. Calculation cannot be performed.    HGB 11.8 (L) 01/15/2024    HGB 11.7 (L) 10/20/2023    HGB 12.5 (L) 06/12/2023    89 -year-old male with medical history of CKD 3b (baseline serum creatinine 1.4 - 2.4 mg/dL), HTN, mild CM, HFrEF -> HFpEF (EF 50-55%, 4/7/21), pulm HTN, mild CAD, and HLD.     # CKD3b: Creatinine 2.1 mg/dl, eGFR 28 ml/min on 2/9/24, no labs since then, advised to complete labs today. Ct Lasix  80 mg/day, leg elevation, compression sock use. Weigh daily and take extra dose of Lasix 100  mg daily in case of weight gain. He knows to avoid NSAIDs. Discussed low protein predominantly plant protein diet- he will move to an assisted living in a few mo when he will follow this, currently eats broiled meat and potato daily as that's all he can cook     # HTN: On losartan 25 mg once a day, metoprolol succinate 100 mg  a day, spironolactone 25 mg once a day, furosemide as above. Blood pressure acceptable. Continue current medications without changes. 2.5 g/day sodium restricted diet discussed again     # LE edema, weight gain: as above      RTC: In 4-6 months

## 2024-08-20 ENCOUNTER — OFFICE VISIT (OUTPATIENT)
Dept: CARDIOLOGY | Facility: CLINIC | Age: 89
End: 2024-08-20
Payer: MEDICARE

## 2024-08-20 VITALS
BODY MASS INDEX: 30.17 KG/M2 | DIASTOLIC BLOOD PRESSURE: 56 MMHG | HEART RATE: 60 BPM | HEIGHT: 68 IN | WEIGHT: 199.1 LBS | SYSTOLIC BLOOD PRESSURE: 89 MMHG | OXYGEN SATURATION: 90 %

## 2024-08-20 DIAGNOSIS — I50.9 CONGESTIVE HEART FAILURE, UNSPECIFIED HF CHRONICITY, UNSPECIFIED HEART FAILURE TYPE (MULTI): ICD-10-CM

## 2024-08-20 DIAGNOSIS — I25.10 MILD CAD: Primary | ICD-10-CM

## 2024-08-20 LAB
CREAT UR-MCNC: 60.8 MG/DL (ref 20–370)
MICROALBUMIN UR-MCNC: <7 MG/L
MICROALBUMIN/CREAT UR: NORMAL MG/G{CREAT}

## 2024-08-20 PROCEDURE — 93005 ELECTROCARDIOGRAM TRACING: CPT | Performed by: INTERNAL MEDICINE

## 2024-08-20 PROCEDURE — 99214 OFFICE O/P EST MOD 30 MIN: CPT | Performed by: INTERNAL MEDICINE

## 2024-08-20 PROCEDURE — 3078F DIAST BP <80 MM HG: CPT | Performed by: INTERNAL MEDICINE

## 2024-08-20 PROCEDURE — 93010 ELECTROCARDIOGRAM REPORT: CPT | Performed by: INTERNAL MEDICINE

## 2024-08-20 PROCEDURE — 1126F AMNT PAIN NOTED NONE PRSNT: CPT | Performed by: INTERNAL MEDICINE

## 2024-08-20 PROCEDURE — 99214 OFFICE O/P EST MOD 30 MIN: CPT | Mod: 25 | Performed by: INTERNAL MEDICINE

## 2024-08-20 PROCEDURE — 1159F MED LIST DOCD IN RCRD: CPT | Performed by: INTERNAL MEDICINE

## 2024-08-20 PROCEDURE — 1036F TOBACCO NON-USER: CPT | Performed by: INTERNAL MEDICINE

## 2024-08-20 PROCEDURE — 3074F SYST BP LT 130 MM HG: CPT | Performed by: INTERNAL MEDICINE

## 2024-08-20 ASSESSMENT — ENCOUNTER SYMPTOMS
ALTERED MENTAL STATUS: 0
CHILLS: 0
LOSS OF SENSATION IN FEET: 0
COUGH: 0
OCCASIONAL FEELINGS OF UNSTEADINESS: 1
NAUSEA: 0
DIARRHEA: 0
HEADACHES: 0
BLOATING: 0
HEMOPTYSIS: 0
VOMITING: 0
FEVER: 0
CONSTIPATION: 0
MYALGIAS: 0
DEPRESSION: 0
ABDOMINAL PAIN: 0
MEMORY LOSS: 0
FALLS: 0
HEMATURIA: 0
DYSURIA: 0

## 2024-08-20 ASSESSMENT — PATIENT HEALTH QUESTIONNAIRE - PHQ9
1. LITTLE INTEREST OR PLEASURE IN DOING THINGS: NOT AT ALL
SUM OF ALL RESPONSES TO PHQ9 QUESTIONS 1 AND 2: 0
2. FEELING DOWN, DEPRESSED OR HOPELESS: NOT AT ALL

## 2024-08-20 ASSESSMENT — PAIN SCALES - GENERAL: PAINLEVEL: 0-NO PAIN

## 2024-08-20 NOTE — PROGRESS NOTES
Chief Complaint   Patient presents with    Follow-up    Heart Failure       HPI  89 yo WM w/ h/o mild CM, HFrEF -> HFpEF, pulm HTN, mild CAD, elevated CCS, HTN, HPL, CKD, FHx CAD now here for cardiology f/u. No chest pain. No sig dyspnea at rest. +ch HANKINS (worst after bending over), prev no sig improvement on increased diuretics. +occ mild orthopnea/wheeze (more when 1st getting into bed and resolves after seconds). No PND. No palps. +occ mild exertional/positional LH/dizziness. No syncope. +ch dependent LE edema. No claudication. +occ cough (no change on allergy med or PPI). +occ fatigue. +occ snoring (when on back). +occ B arm weakness x 30-60 seconds. +ch foot numb. +easy bleeding. Wgt remains up.  BP at home (occ checked): usually <100/60 (occ up to 130/70), HR 60s  Wgt at home: ~194 lbs (base ~185 lbs)  ECG 6/17: SR (70), 1st deg AVB, LAFB  ECG 12/17: SR (81), LAFB, LVH w/ ST changes  ECG 12/18: SR (64), 1st deg AVB, LAFB, LVH  ECG 1/20: SB (58), 1st deg AVB, LBBB  ECG 6/22: SB (57), IVCD  ECG 1/23: SR (64), IVCD, PVCs/bigeminy  ECG 8/24: SR (60), 1st deg AVB, LBBB  Echo 11/17: EF 35-40%, ecc LVH, DD, LAE, mild MR, mild AI  Echo 4/21: EF 50-55%, DD, mod LAE, valves ok  CCS 12/17: 744 (LM 0, , LCx 0, ), small R pleural effusion, sm-mod HH  Cath 5/18: pLAD 30%, pRCA 10-30%, LVEDP 22-27, PCW 22, PA 60/26/41, CO 5.3, CI 2.6, no shunt, no AS  CXR 1/17: no acute abnl  CXR 3/19: HH, subseg atelectasis at LL base, otherwise lungs clear  CXR 5/24: tr L pl eff + atelectasis  CT brain 6/17: no acute abnl     Review of Systems   Constitutional: Negative for chills, fever and malaise/fatigue.   HENT:  Negative for hearing loss.    Eyes:  Negative for visual disturbance.   Respiratory:  Negative for cough and hemoptysis.    Skin:  Negative for rash.   Musculoskeletal:  Negative for falls and myalgias.   Gastrointestinal:  Negative for bloating, abdominal pain, constipation, diarrhea, dysphagia, nausea and  vomiting.   Genitourinary:  Negative for dysuria and hematuria.   Neurological:  Negative for headaches.   Psychiatric/Behavioral:  Negative for altered mental status, depression and memory loss.       Social History     Tobacco Use    Smoking status: Never     Passive exposure: Never    Smokeless tobacco: Never   Substance Use Topics    Alcohol use: Yes     Comment: rare      Family History   Problem Relation Name Age of Onset    Alcohol abuse Mother      No Known Problems Father      Heart disease Brother          Age 53    Other (Age 95) Brother      No Known Problems Daughter      No Known Problems Son      No Known Problems Son      Other (Drug overdose) Son        Allergies   Allergen Reactions    Penicillins Hives and Swelling    Sulfa (Sulfonamide Antibiotics) Hives and Swelling    Wellbutrin [Bupropion Hcl] Hives      Current Outpatient Medications   Medication Instructions    aspirin 81 mg EC tablet 1 tablet, oral, Every other day    furosemide (LASIX) 100 mg, oral, Daily    isosorbide mononitrate ER (IMDUR) 60 mg, oral, Daily    losartan (COZAAR) 25 mg, oral, Daily    melatonin 10 mg capsule 1 capsule, oral, Nightly    metoprolol succinate XL (TOPROL-XL) 100 mg, oral, 2 times daily    mirtazapine (REMERON) 15 mg, oral, Nightly    multivit-iron-minerals-folic acid (Centrum Silver) 0.4 mg-300 mcg- 250 mcg tab 1 tablet, oral, Daily    nepafenac (Nevanac) 0.1 % ophthalmic suspension 1 drop, ophthalmic (eye), 3 times daily    omeprazole (PRILOSEC) 20 mg, oral, Daily before breakfast, NEEDS TO MAKE AN APPOINTMENT.    simvastatin (ZOCOR) 20 mg, oral, Nightly    spironolactone (ALDACTONE) 25 mg, oral, Daily    timolol (Timoptic) 0.5 % ophthalmic solution ophthalmic (eye), Every 12 hours    traZODone (DESYREL) 50 mg, oral, Nightly      Vitals:    08/20/24 1307   BP: 89/56   Pulse:    SpO2:       Physical Exam  Constitutional:       Appearance: Normal appearance.   HENT:      Head: Normocephalic and atraumatic.       Nose: Nose normal.   Cardiovascular:      Rate and Rhythm: Normal rate and regular rhythm.      Heart sounds: No murmur heard.  Pulmonary:      Effort: Pulmonary effort is normal.      Breath sounds: Examination of the right-lower field reveals rales. Examination of the left-lower field reveals rales. Rales present.   Abdominal:      Palpations: Abdomen is soft.      Tenderness: There is no abdominal tenderness.   Musculoskeletal:      Right lower le+ Edema present.      Left lower le+ Edema present.   Skin:     General: Skin is warm and dry.   Neurological:      General: No focal deficit present.      Mental Status: He is alert.   Psychiatric:         Mood and Affect: Mood normal.         Judgment: Judgment normal.        Results/Data   Cr 2.41, K 5.2 (Lasix 100)   Cr 2.14, K 4.9   Cr 2.22, K 4.9, LDL 58, HGB 11.8, , TSH 3.92   Cr 1.53, K 4.8, ALT/AST nl, LDL 71, HDL 46, , Chol 145, HGB 12.5, , TSH 3.73   Cr 1.72, K 4.9, LFT nl, HGB 11.1,   10/22 Cr 1.35, K 4.3, LFT nl   Cr 1.62, K 4.8, Na 138, ALT/AST nl, LDL 74, HDL 46, , Chol 145, HGB 13.3, , TSH 3.48   Cr 1.56, K 4.6   Cr 2.02, K 5.2, LFT nl, HGB 12.9,   10/21 Cr 1.83, K 5.1   Cr 1.86, K 5.1, HGB 12.5,    Cr 2.33, K 4.8   Cr 3.02, K 4.3, Mg 2.24, BNP 39 (Lasix, [place], Metolazone)   Cr 2.38, K 4.6, BUN 52 (Lasix 80, Sprio 25)   Cr 2.02, K 5.1  3/21 HGB 13.4,    Cr 1.78, K 4.3, HGB 12.1, , BNP 96   Cr 1.4, K 4.7, HGB 11.9, ,    Cr 1.84, K 4.5   Cr 1.3, K 4.9, ALT/AST nl, LDL 77, HDL 48, TG 75, Chol 140, HGB 11.1, , TSH 5.2  10/19 Cr 1.7, K 4.6, ALT/AST nl, LDL 83, HDL 46, , Chol 157, HGB 13, , TSH 4.5, FT4 1.2   Cr 1.4, K 4.3  3/19 Cr 1.7, K 4.5, BNP 70   Cr 1.91, K 4.8, BNP 99   Cr 1.6, K 4.6, LFT nl, HGB 12.5, , HDL 48, Chol 182   Cr 1.49, K 4.4   Cr 1.4,  K 4.8, LFT nl, HGB 13.5, , BNP 59  4/18 Cr 1.3, K 5.2, HGB 12.8,   12/17 Cr 1.13, K 4.3,   12/17 Cr 0.9, K 3.8, HGB 13.4, , TSH 2.7  6/17 ALT/AST nl, LDL 91, HDL 59, , Chol 170  1/17 TPN neg     Assessment/Plan   89 yo WM w/ h/o mild CM, HFrEF -> HFpEF, pulm HTN, mild CAD, elevated CCS, HTN, HPL, CKD, FHx CAD now with continued acute on chronic decompensated CHF (HANKINS/edema). Problem is kidney gets irritated with more diuretics. May have to accept a balance between CHF and CKD. He feels overall okay, so will not change meds.  CM essentially resolved on BB/ARB - EF low normal to very mild reduced at 50-55%.  L/R press elevated. Pulm HTN most likely sec to CHF. Consider PFTs (earlene if more dyspnea/wheezing) or can just try MDI (he prefers to hold off for now).   Okay to remain conservative in this elderly patient - try to minimize symptoms.  -continue ASA 81 qMWF (easy bleeding on qd)  -continue Metoprolol Succinate 100 bid  -continue Losartan 25 qd  -continue Imdur 60 qd (can reduce if any symptomatic hypotension)  -continue Spironolactone 25 qd  -continue Furosemide 80 every day (just re-reduced from 100 by nephrology due to GARRET)  (he claims Bumex did not work for him; kidney prev irritated on Metolazone)  -continue Simva 20 qhs  -f/u 3 months (earlier if needed)     Mike Lao MD

## 2024-08-22 LAB
ATRIAL RATE: 60 BPM
P AXIS: -14 DEGREES
P OFFSET: 136 MS
P ONSET: 68 MS
PR INTERVAL: 282 MS
Q ONSET: 209 MS
QRS COUNT: 10 BEATS
QRS DURATION: 134 MS
QT INTERVAL: 418 MS
QTC CALCULATION(BAZETT): 418 MS
QTC FREDERICIA: 418 MS
R AXIS: -53 DEGREES
T AXIS: 18 DEGREES
T OFFSET: 418 MS
VENTRICULAR RATE: 60 BPM

## 2024-08-26 ENCOUNTER — TELEPHONE (OUTPATIENT)
Dept: PRIMARY CARE | Facility: CLINIC | Age: 89
End: 2024-08-26
Payer: MEDICARE

## 2024-08-27 VITALS
WEIGHT: 199 LBS | RESPIRATION RATE: 16 BRPM | SYSTOLIC BLOOD PRESSURE: 106 MMHG | HEART RATE: 68 BPM | BODY MASS INDEX: 31.17 KG/M2 | DIASTOLIC BLOOD PRESSURE: 70 MMHG

## 2024-08-27 RX ORDER — FUROSEMIDE 20 MG/1
80 TABLET ORAL DAILY
Qty: 120 TABLET | Refills: 0 | Status: SHIPPED | OUTPATIENT
Start: 2024-08-27 | End: 2025-08-27

## 2024-08-27 RX ORDER — TRAZODONE HYDROCHLORIDE 50 MG/1
25 TABLET ORAL NIGHTLY
Qty: 30 TABLET | Refills: 0 | Status: SHIPPED | OUTPATIENT
Start: 2024-08-27

## 2024-08-27 ASSESSMENT — ENCOUNTER SYMPTOMS
CONSTITUTIONAL NEGATIVE: 1
CARDIOVASCULAR NEGATIVE: 1
RESPIRATORY NEGATIVE: 1
NEUROLOGICAL NEGATIVE: 1

## 2024-09-13 ENCOUNTER — APPOINTMENT (OUTPATIENT)
Dept: PRIMARY CARE | Facility: CLINIC | Age: 89
End: 2024-09-13
Payer: COMMERCIAL

## 2024-09-17 ENCOUNTER — OFFICE VISIT (OUTPATIENT)
Dept: CARDIOLOGY | Facility: CLINIC | Age: 89
End: 2024-09-17
Payer: MEDICARE

## 2024-09-17 ENCOUNTER — HOSPITAL ENCOUNTER (OUTPATIENT)
Dept: CARDIOLOGY | Facility: CLINIC | Age: 89
Discharge: HOME | End: 2024-09-17
Payer: MEDICARE

## 2024-09-17 VITALS
HEIGHT: 68 IN | OXYGEN SATURATION: 94 % | DIASTOLIC BLOOD PRESSURE: 67 MMHG | SYSTOLIC BLOOD PRESSURE: 109 MMHG | HEART RATE: 64 BPM | WEIGHT: 193 LBS | BODY MASS INDEX: 29.25 KG/M2

## 2024-09-17 DIAGNOSIS — R00.0 TACHYCARDIA: ICD-10-CM

## 2024-09-17 DIAGNOSIS — R42 LIGHT HEADED: ICD-10-CM

## 2024-09-17 DIAGNOSIS — R42 LIGHT HEADED: Primary | ICD-10-CM

## 2024-09-17 LAB — BODY SURFACE AREA: 2.05 M2

## 2024-09-17 PROCEDURE — 1159F MED LIST DOCD IN RCRD: CPT | Performed by: INTERNAL MEDICINE

## 2024-09-17 PROCEDURE — 3078F DIAST BP <80 MM HG: CPT | Performed by: INTERNAL MEDICINE

## 2024-09-17 PROCEDURE — 99214 OFFICE O/P EST MOD 30 MIN: CPT | Performed by: INTERNAL MEDICINE

## 2024-09-17 PROCEDURE — 93243 EXT ECG>48HR<7D SCAN A/R: CPT

## 2024-09-17 PROCEDURE — 1036F TOBACCO NON-USER: CPT | Performed by: INTERNAL MEDICINE

## 2024-09-17 PROCEDURE — 3074F SYST BP LT 130 MM HG: CPT | Performed by: INTERNAL MEDICINE

## 2024-09-17 ASSESSMENT — ENCOUNTER SYMPTOMS
MYALGIAS: 0
DEPRESSION: 0
CONSTIPATION: 0
HEADACHES: 0
LOSS OF SENSATION IN FEET: 1
DYSURIA: 0
DIARRHEA: 0
BLOATING: 0
HEMATURIA: 0
ALTERED MENTAL STATUS: 0
CHILLS: 0
FALLS: 0
OCCASIONAL FEELINGS OF UNSTEADINESS: 1
COUGH: 0
ABDOMINAL PAIN: 0
FEVER: 0
MEMORY LOSS: 0
VOMITING: 0
NAUSEA: 0
HEMOPTYSIS: 0

## 2024-09-17 NOTE — PROGRESS NOTES
Chief Complaint   Patient presents with    Dizziness       HPI  89 yo WM w/ h/o mild CM, HFrEF -> HFpEF, pulm HTN, mild CAD, elevated CCS, HTN, HPL, CKD, FHx CAD now here for cardiology f/u. This past week he has noticed increased LH/dizzy (BP low during episodes). Also occ HR 90s without assoc symptoms.  No chest pain. No sig dyspnea at rest. +ch HANKINS (worst after bending over), prev no sig improvement on increased diuretics. +occ mild orthopnea/wheeze (more when 1st getting into bed and resolves after seconds). No PND. No palps. +occ mild exertional/positional LH/dizziness. No syncope. +ch dependent LE edema. No claudication. +occ cough (no change on allergy med or PPI). +occ fatigue. +occ snoring (when on back). +occ B arm weakness x 30-60 seconds. +ch foot numb. +easy bleeding. Wgt remains up.  BP at home (occ checked): usually <100/60 (occ up to 130/70), HR 60s (rare 90s)  Wgt at home: ~193 lbs (base ~185 lbs)  ECG 6/17: SR (70), 1st deg AVB, LAFB  ECG 12/17: SR (81), LAFB, LVH w/ ST changes  ECG 12/18: SR (64), 1st deg AVB, LAFB, LVH  ECG 1/20: SB (58), 1st deg AVB, LBBB  ECG 6/22: SB (57), IVCD  ECG 1/23: SR (64), IVCD, PVCs/bigeminy  ECG 8/24: SR (60), 1st deg AVB, LBBB  Echo 11/17: EF 35-40%, ecc LVH, DD, LAE, mild MR, mild AI  Echo 4/21: EF 50-55%, DD, mod LAE, valves ok  CCS 12/17: 744 (LM 0, , LCx 0, ), small R pleural effusion, sm-mod HH  Cath 5/18: pLAD 30%, pRCA 10-30%, LVEDP 22-27, PCW 22, PA 60/26/41, CO 5.3, CI 2.6, no shunt, no AS  CXR 1/17: no acute abnl  CXR 3/19: HH, subseg atelectasis at LL base, otherwise lungs clear  CXR 5/24: tr L pl eff + atelectasis  CT brain 6/17: no acute abnl     Review of Systems   Constitutional: Negative for chills, fever and malaise/fatigue.   HENT:  Negative for hearing loss.    Eyes:  Negative for visual disturbance.   Respiratory:  Negative for cough and hemoptysis.    Skin:  Negative for rash.   Musculoskeletal:  Negative for falls and myalgias.    Gastrointestinal:  Negative for bloating, abdominal pain, constipation, diarrhea, dysphagia, nausea and vomiting.   Genitourinary:  Negative for dysuria and hematuria.   Neurological:  Negative for headaches.   Psychiatric/Behavioral:  Negative for altered mental status, depression and memory loss.       Social History     Tobacco Use    Smoking status: Never     Passive exposure: Never    Smokeless tobacco: Never   Substance Use Topics    Alcohol use: Yes     Comment: rare      Family History   Problem Relation Name Age of Onset    Alcohol abuse Mother      No Known Problems Father      Heart disease Brother          Age 53    Other (Age 95) Brother      No Known Problems Daughter      No Known Problems Son      No Known Problems Son      Other (Drug overdose) Son        Allergies   Allergen Reactions    Penicillins Hives and Swelling    Sulfa (Sulfonamide Antibiotics) Hives and Swelling    Wellbutrin [Bupropion Hcl] Hives      Current Outpatient Medications   Medication Instructions    aspirin 81 mg EC tablet 1 tablet, oral, Every other day    furosemide (LASIX) 80 mg, oral, Daily    isosorbide mononitrate ER (IMDUR) 60 mg, oral, Daily    losartan (COZAAR) 25 mg, oral, Daily    melatonin 10 mg capsule 1 capsule, oral, Nightly    metoprolol succinate XL (TOPROL-XL) 100 mg, oral, 2 times daily    mirtazapine (REMERON) 15 mg, oral, Nightly    multivit-iron-minerals-folic acid (Centrum Silver) 0.4 mg-300 mcg- 250 mcg tab 1 tablet, oral, Daily    nepafenac (Nevanac) 0.1 % ophthalmic suspension 1 drop, ophthalmic (eye), 3 times daily    simvastatin (ZOCOR) 20 mg, oral, Nightly    spironolactone (ALDACTONE) 25 mg, oral, Daily    timolol (Timoptic) 0.5 % ophthalmic solution ophthalmic (eye), Every 12 hours    traZODone (DESYREL) 25 mg, oral, Nightly      Vitals:    09/17/24 1401   BP: 109/67   Pulse: 64   SpO2: 94%      Physical Exam  Constitutional:       Appearance: Normal appearance.   HENT:      Head: Normocephalic  and atraumatic.      Nose: Nose normal.   Cardiovascular:      Rate and Rhythm: Normal rate and regular rhythm.      Heart sounds: No murmur heard.  Pulmonary:      Effort: Pulmonary effort is normal.      Breath sounds: Examination of the right-lower field reveals rales. Examination of the left-lower field reveals rales. Rales present.   Abdominal:      Palpations: Abdomen is soft.      Tenderness: There is no abdominal tenderness.   Musculoskeletal:      Right lower le+ Edema present.      Left lower le+ Edema present.   Skin:     General: Skin is warm and dry.   Neurological:      General: No focal deficit present.      Mental Status: He is alert.   Psychiatric:         Mood and Affect: Mood normal.         Judgment: Judgment normal.        Results/Data   Cr 2.41, K 5.2 (Lasix 100)   Cr 2.14, K 4.9   Cr 2.22, K 4.9, LDL 58, HGB 11.8, , TSH 3.92   Cr 1.53, K 4.8, ALT/AST nl, LDL 71, HDL 46, , Chol 145, HGB 12.5, , TSH 3.73   Cr 1.72, K 4.9, LFT nl, HGB 11.1,   10/22 Cr 1.35, K 4.3, LFT nl   Cr 1.62, K 4.8, Na 138, ALT/AST nl, LDL 74, HDL 46, , Chol 145, HGB 13.3, , TSH 3.48   Cr 1.56, K 4.6   Cr 2.02, K 5.2, LFT nl, HGB 12.9,   10/21 Cr 1.83, K 5.1   Cr 1.86, K 5.1, HGB 12.5,    Cr 2.33, K 4.8   Cr 3.02, K 4.3, Mg 2.24, BNP 39 (Lasix, [place], Metolazone)   Cr 2.38, K 4.6, BUN 52 (Lasix 80, Sprio 25)   Cr 2.02, K 5.1  3/21 HGB 13.4,    Cr 1.78, K 4.3, HGB 12.1, , BNP 96   Cr 1.4, K 4.7, HGB 11.9, ,    Cr 1.84, K 4.5   Cr 1.3, K 4.9, ALT/AST nl, LDL 77, HDL 48, TG 75, Chol 140, HGB 11.1, , TSH 5.2  10/19 Cr 1.7, K 4.6, ALT/AST nl, LDL 83, HDL 46, , Chol 157, HGB 13, , TSH 4.5, FT4 1.2   Cr 1.4, K 4.3  3/19 Cr 1.7, K 4.5, BNP 70   Cr 1.91, K 4.8, BNP 99   Cr 1.6, K 4.6, LFT nl, HGB 12.5, , HDL 48, Chol 182   Cr 1.49,  K 4.4  5/18 Cr 1.4, K 4.8, LFT nl, HGB 13.5, , BNP 59  4/18 Cr 1.3, K 5.2, HGB 12.8,   12/17 Cr 1.13, K 4.3,   12/17 Cr 0.9, K 3.8, HGB 13.4, , TSH 2.7  6/17 ALT/AST nl, LDL 91, HDL 59, , Chol 170  1/17 TPN neg     Assessment/Plan   89 yo WM w/ h/o mild CM, HFrEF -> HFpEF, pulm HTN, mild CAD, elevated CCS, HTN, HPL, CKD, FHx CAD now w/ occ LH/dizzy likely related to hypotension (as he gets low BP on home check). Reduce meds. Unclear etiology of rare 90s. Check monitor.  He has chronic decompensated CHF (HANKINS/edema). Problem is kidney gets irritated with more diuretics. May have to accept a balance between CHF and CKD. He feels overall okay, so will not change meds.  CM essentially resolved on BB/ARB - EF low normal to very mild reduced at 50-55%.  L/R press elevated. Pulm HTN most likely sec to CHF. Consider PFTs (earlene if more dyspnea/wheezing) or can just try MDI (he prefers to hold off for now).   Okay to remain conservative in this elderly patient - try to minimize symptoms.  -continue ASA 81 qMWF (easy bleeding on qd)  -continue Metoprolol Succinate 100 bid  -continue Losartan 25 qd  -stop  Imdur 60 qd (symptomatic hypotension)  -continue Spironolactone 25 qd  -continue Furosemide 80 every day (just re-reduced from 100 by nephrology due to GARRET)  (he claims Bumex did not work for him; kidney prev irritated on Metolazone)  -continue Simva 20 qhs  -f/u 3 months (earlier if needed)     Mike Lao MD

## 2024-10-01 ENCOUNTER — APPOINTMENT (OUTPATIENT)
Dept: PRIMARY CARE | Facility: CLINIC | Age: 89
End: 2024-10-01
Payer: MEDICARE

## 2024-10-01 DIAGNOSIS — G60.9 IDIOPATHIC PERIPHERAL NEUROPATHY: ICD-10-CM

## 2024-10-01 DIAGNOSIS — I10 HYPERTENSION, UNSPECIFIED TYPE: ICD-10-CM

## 2024-10-01 DIAGNOSIS — M17.0 PRIMARY OSTEOARTHRITIS OF BOTH KNEES: ICD-10-CM

## 2024-10-01 DIAGNOSIS — R60.0 BILATERAL LOWER EXTREMITY EDEMA: ICD-10-CM

## 2024-10-01 DIAGNOSIS — E87.1 HYPONATREMIA: ICD-10-CM

## 2024-10-01 DIAGNOSIS — N18.4 CKD (CHRONIC KIDNEY DISEASE), STAGE IV (MULTI): Primary | ICD-10-CM

## 2024-10-01 DIAGNOSIS — Z23 ENCOUNTER FOR IMMUNIZATION: ICD-10-CM

## 2024-10-01 DIAGNOSIS — L97.511 NON-PRESSURE CHRONIC ULCER OF OTHER PART OF RIGHT FOOT LIMITED TO BREAKDOWN OF SKIN: ICD-10-CM

## 2024-10-01 PROCEDURE — 1036F TOBACCO NON-USER: CPT | Performed by: INTERNAL MEDICINE

## 2024-10-01 PROCEDURE — 1159F MED LIST DOCD IN RCRD: CPT | Performed by: INTERNAL MEDICINE

## 2024-10-01 PROCEDURE — 3078F DIAST BP <80 MM HG: CPT | Performed by: INTERNAL MEDICINE

## 2024-10-01 PROCEDURE — 1160F RVW MEDS BY RX/DR IN RCRD: CPT | Performed by: INTERNAL MEDICINE

## 2024-10-01 PROCEDURE — 3074F SYST BP LT 130 MM HG: CPT | Performed by: INTERNAL MEDICINE

## 2024-10-01 PROCEDURE — 99215 OFFICE O/P EST HI 40 MIN: CPT | Performed by: INTERNAL MEDICINE

## 2024-10-01 NOTE — PROGRESS NOTES
"Subjective   Patient ID: Jf Watts \"TORIE\" is a 89 y.o. male who presents for Follow-up.  HPI  Dizzy driving car Dr Lao -keith off isosorbide   nl monitor   Diarrhea intermittent loose imodium helps   Metamucil   Move to [place]- application forms     Review of Systems   Constitutional: Negative.  Negative for fever.   Respiratory: Negative.     Cardiovascular: Negative.    Neurological:  Positive for dizziness. Negative for syncope, weakness, light-headedness and headaches.       Objective   Physical Exam  Constitutional:       General: He is not in acute distress.     Appearance: Normal appearance.   Cardiovascular:      Rate and Rhythm: Normal rate and regular rhythm.      Pulses: Normal pulses.      Heart sounds: Normal heart sounds.   Pulmonary:      Effort: Pulmonary effort is normal.      Breath sounds: Normal breath sounds.   Abdominal:      General: Abdomen is flat. Bowel sounds are normal.      Palpations: Abdomen is soft.   Musculoskeletal:         General: No tenderness.      Right lower leg: Edema present.      Left lower leg: Edema present.      Comments: Compression stockings, nontender   Skin:     Findings: Lesion present.      Comments: Multiple keratosis and nevi without discrete rash/hives   Neurological:      General: No focal deficit present.      Mental Status: He is alert.      Gait: Gait abnormal.      Comments: Ambulatory with cane   Psychiatric:         Attention and Perception: Attention normal.         Mood and Affect: Mood normal. Mood is not anxious or depressed. Affect is not blunt, angry or tearful.         Speech: Speech normal.         Behavior: Behavior is slowed. Behavior is not agitated. Behavior is cooperative.         Thought Content: Thought content normal.         Cognition and Memory: Cognition normal.      Comments: Comfortable and relaxed  Comfortable with decision to move       /70   Pulse 60   Resp 16   Wt 86.6 kg (191 lb)   BMI 29.04 kg/m² "     Data  Cardiac monitor 9/17/2024 satisfactory over 1 week  8/19/2024 renal function creatinine 2.4, GFR 24  Nephrology consult 8/8/2024 reviewed  Cardiology consult 9/17/2024 reviewed  CBC 1/15/2024 platelets 203  Assessment/Plan     Overall you are doing well today  Heart monitor was satisfactory  Sodium level was good  Recommend 3 furosemide daily equals 60 mg, recheck in 2 months  Administer pneumonia shot Prevnar 20  Consider renewal pneumonia shot Pneumovax 23 in future  Application to [place]    Problem List Items Addressed This Visit       Hyponatremia    Idiopathic peripheral neuropathy    Hypertension    Osteoarthritis of knee    Bilateral lower extremity edema    CKD (chronic kidney disease), stage IV (Multi) - Primary    Encounter for immunization

## 2024-10-01 NOTE — PATIENT INSTRUCTIONS
Overall you are doing well today  Heart monitor was satisfactory  Sodium level was good  Recommend 3 furosemide daily equals 60 mg, recheck in 2 months  Administer pneumonia shot Prevnar 20  Consider renewal pneumonia shot Pneumovax 23 in future

## 2024-10-04 LAB — BODY SURFACE AREA: 2.05 M2

## 2024-10-07 ENCOUNTER — APPOINTMENT (OUTPATIENT)
Dept: BEHAVIORAL HEALTH | Facility: CLINIC | Age: 89
End: 2024-10-07
Payer: MEDICARE

## 2024-10-07 VITALS
TEMPERATURE: 97.2 F | HEART RATE: 64 BPM | HEIGHT: 68 IN | SYSTOLIC BLOOD PRESSURE: 128 MMHG | WEIGHT: 197 LBS | DIASTOLIC BLOOD PRESSURE: 74 MMHG | BODY MASS INDEX: 29.86 KG/M2

## 2024-10-07 DIAGNOSIS — F33.2 SEVERE EPISODE OF RECURRENT MAJOR DEPRESSIVE DISORDER, WITHOUT PSYCHOTIC FEATURES (MULTI): ICD-10-CM

## 2024-10-07 DIAGNOSIS — G47.00 INSOMNIA, UNSPECIFIED TYPE: ICD-10-CM

## 2024-10-07 DIAGNOSIS — F41.9 ANXIETY DISORDER, UNSPECIFIED TYPE: ICD-10-CM

## 2024-10-07 PROCEDURE — 1036F TOBACCO NON-USER: CPT | Performed by: PSYCHIATRY & NEUROLOGY

## 2024-10-07 PROCEDURE — 90833 PSYTX W PT W E/M 30 MIN: CPT | Performed by: PSYCHIATRY & NEUROLOGY

## 2024-10-07 PROCEDURE — 1126F AMNT PAIN NOTED NONE PRSNT: CPT | Performed by: PSYCHIATRY & NEUROLOGY

## 2024-10-07 PROCEDURE — 3078F DIAST BP <80 MM HG: CPT | Performed by: PSYCHIATRY & NEUROLOGY

## 2024-10-07 PROCEDURE — 99214 OFFICE O/P EST MOD 30 MIN: CPT | Performed by: PSYCHIATRY & NEUROLOGY

## 2024-10-07 PROCEDURE — 3074F SYST BP LT 130 MM HG: CPT | Performed by: PSYCHIATRY & NEUROLOGY

## 2024-10-07 RX ORDER — TRAZODONE HYDROCHLORIDE 50 MG/1
25 TABLET ORAL NIGHTLY
Qty: 45 TABLET | Refills: 1 | Status: SHIPPED | OUTPATIENT
Start: 2024-10-07 | End: 2025-04-05

## 2024-10-07 ASSESSMENT — PAIN SCALES - GENERAL: PAINLEVEL: 0-NO PAIN

## 2024-10-07 NOTE — PROGRESS NOTES
Outpatient Psychiatry        Reason for Visit: follow up for depression, unspecified, remission ; insomnia ; bereavement, uncomplicated .  -he is now off isosorbide       HPI: 89 y/o  male who presents for F/U regarding depression and insomnia. Sees children once per week. Sleep is ok, still wakes up at 3 am but can resume sleep most nights following urination. On nights he can not fall back to sleep he does not feeling great the next day. Moving to [place], his daughter is helping and states she will take care of the move. Jf still worries about the move/logistics.   He plans to buy motorized wheel chair.     Tries to stay active and socializes. He attends book club and play writing group.  Feels sad daily as he is alone, misses wife, . Facing decline in physical abilities/stamina.     Goes out to dinner one to 2 times per week. attends book club monthly. conducts a grief session at [employer] weekly , goes to coffee get together weekly in his building.     no death wish or SI.   There have been no hallucinations.   He is taking mirtazapine, melatonin nightly and trazadone.   nocturia awakens him at night.   USes a cane , gets tired when walking. Back hurts with standing/walking   no new health issues. No falls     past psych h/o: previously following with Angelia Cuenca NP. Began to see her after inpatient admission [facility] 2017 for depression and SI. No other psych tx. He has been on celexa, seroquel, trazadone, stoppped cymbalta 30 mg 9/2018. Took ambien for 10 years until 2017.      chem dep: no substance abuse h/o, but has one drink per night (1.5 ounces of liquor or 8 oz of wine).      family psych h/o: mom- alcoholic, brother - alcohol abuse and his 2 kids drink heavily.      past med h/o: glaucoma, HTN, GERD, HLD, h/o prostate CA, cardiomyopathy, B/L TKA   all: sulfa, PCN, wellbutrin (hives)      social h/o: raised by mom and step-dad. Finished hs and straight to college - [employer]. Worked in  , retired .  X2 . Currently  43 years. He has 3 bio sons- one son  from substance abuse in . One step daughter. All living children are in the area and they have close relationships. He sees his grandchildren at family gatherings. He loves sports and curls. . He enjoys activities. 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 Played football along with other sports.          Current Medications:    Current Outpatient Medications:     aspirin 81 mg EC tablet, Take 1 tablet (81 mg) by mouth every other day., Disp: , Rfl:     furosemide (Lasix) 20 mg tablet, Take 4 tablets (80 mg) by mouth once daily., Disp: 120 tablet, Rfl: 0    losartan (Cozaar) 25 mg tablet, TAKE 1 TABLET BY MOUTH DAILY, Disp: 90 tablet, Rfl: 3    melatonin 10 mg capsule, Take 1 capsule (10 mg) by mouth once daily at bedtime., Disp: , Rfl:     metoprolol succinate XL (Toprol-XL) 100 mg 24 hr tablet, TAKE 1 TABLET BY MOUTH TWICE  DAILY, Disp: 180 tablet, Rfl: 3    mirtazapine (Remeron) 15 mg tablet, TAKE 1 TABLET BY MOUTH ONCE  DAILY AT BEDTIME, Disp: 90 tablet, Rfl: 0    multivit-iron-minerals-folic acid (Centrum Silver) 0.4 mg-300 mcg- 250 mcg tab, Take 1 tablet by mouth once daily., Disp: , Rfl:     nepafenac (Nevanac) 0.1 % ophthalmic suspension, Administer 1 drop into affected eye(s) 3 times a day., Disp: , Rfl:     simvastatin (Zocor) 20 mg tablet, TAKE 1 TABLET BY MOUTH IN THE  EVENING, Disp: 90 tablet, Rfl: 3    spironolactone (Aldactone) 25 mg tablet, TAKE 1 TABLET BY MOUTH DAILY, Disp: 90 tablet, Rfl: 3    timolol (Timoptic) 0.5 % ophthalmic solution, Administer into affected eye(s) every 12 hours., Disp: , Rfl:     traZODone (Desyrel) 50 mg tablet, Take 0.5 tablets (25 mg) by mouth once daily at bedtime., Disp: 30 tablet, Rfl: 0  Medical History:  Past Medical History:   Diagnosis Date    Acquired trigger finger 2023    Acute bronchitis 2023    Acute depression 2023    Anxiety in  acute stress reaction 06/06/2023    Bacterial pneumonia 06/06/2023    Blepharitis 06/06/2023    Chronic otitis media of left ear 06/06/2023    Conjunctivitis 06/06/2023    Cough 06/06/2023    Disease due to severe acute respiratory syndrome coronavirus 2 (SARS-CoV-2) 06/06/2023    Dysphonia 05/15/2019    Dysphonia    Encounter for debridement of left postmastoidectomy cavity 06/06/2023    Exposure to COVID-19 virus 06/06/2023    Fever of unknown origin 06/06/2023    GERD (gastroesophageal reflux disease) 06/20/2023    Heart failure with preserved left ventricular function (HFpEF) 06/06/2023    Hip pain, left 06/06/2023    Hyperlipidemia 06/20/2023    Impacted cerumen of left ear 06/06/2023    Lightheadedness 06/06/2023    Middle ear infection, chronic 06/06/2023    Osteoarthritis of knee, unspecified 05/16/2016    Osteoarthritis of knee    Other conditions influencing health status     Prostate Cancer    Other conditions influencing health status     Colonoscopy (Fiberoptic)    Other specified postprocedural states 11/03/2018    Status post cardiac catheterization    Personal history of other diseases of the circulatory system 02/15/2022    History of cardiomyopathy    Personal history of other diseases of the circulatory system     History of mitral valve disorder    Personal history of other diseases of the circulatory system     History of hypertension    Personal history of other diseases of the digestive system     History of esophageal reflux    Personal history of other endocrine, nutritional and metabolic disease     History of hyperlipidemia    Senile ectropion of left lower eyelid 03/07/2018    Formatting of this note might be different from the original. Added automatically from request for surgery [id number]    Severe single current episode of major depressive disorder, with psychotic features (Multi) 06/06/2023    Spontaneous rupture of extensor tendons, left hand 12/15/2017    Nontraumatic rupture of  sagittal band of extensor tendon of left upper extremity    Spontaneous rupture of extensor tendons, right hand 11/17/2017    Nontraumatic rupture of sagittal band of extensor tendon of right upper extremity    Unspecified systolic (congestive) heart failure 09/26/2022    HFrEF (heart failure with reduced ejection fraction)     Surgical History:  Past Surgical History:   Procedure Laterality Date    ANKLE ARTHROSCOPY W/ OPEN REPAIR  10/23/2013    Ankle Repair    APPENDECTOMY  10/23/2013    Appendectomy    CATARACT EXTRACTION  10/23/2013    Cataract Surgery    OTHER SURGICAL HISTORY  10/23/2013    Mastoidectomy    TOTAL KNEE ARTHROPLASTY  02/04/2014    Total Knee Arthroplasty     Family History:  Family History   Problem Relation Name Age of Onset    Alcohol abuse Mother      No Known Problems Father      Heart disease Brother          Age 53    Other (Age 95) Brother      No Known Problems Daughter      No Known Problems Son      No Known Problems Son      Other (Drug overdose) Son       Social History:  Social History     Socioeconomic History    Marital status:      Spouse name: Not on file    Number of children: Not on file    Years of education: Not on file    Highest education level: Not on file   Occupational History    Not on file   Tobacco Use    Smoking status: Never     Passive exposure: Never    Smokeless tobacco: Never   Substance and Sexual Activity    Alcohol use: Yes     Comment: rare    Drug use: Never    Sexual activity: Not on file   Other Topics Concern    Not on file   Social History Narrative    Not on file     Social Determinants of Health     Financial Resource Strain: Not on file   Food Insecurity: Not on file   Transportation Needs: Not on file   Physical Activity: Not on file   Stress: Not on file   Social Connections: Not on file   Intimate Partner Violence: Not on file   Housing Stability: Not on file       Medical Review Of Systems:  Pertinent items are noted in  "HPI.    Psychiatric Review Of Systems:  Psychiatric ROS - Adult  Anxiety: Negative  Depression: sad at times due to loss of wife  Delirium: negative  Psychosis: negative  Jenn: negative  Safety Issues: none    Disordered Eating Symptoms:n/a  Inattentive Symptoms:n/a   Hyperactive/Impulsive Symptoms:n/a    Trauma Symptoms:n/a       /74   Pulse 64   Temp 36.2 °C (97.2 °F)   Ht 1.727 m (5' 8\")   Wt 89.4 kg (197 lb)   BMI 29.95 kg/m²      LABS  Lab Results   Component Value Date    TSH 3.92 01/15/2024    25 60.2 04/08/2022    [id number] 417 10/25/2022    HDL 43.0 01/15/2024      Lab Results   Component Value Date    CHOL 137 01/15/2024    CHOL 145 06/12/2023    CHOL 145 06/09/2022     Lab Results   Component Value Date    HDL 43.0 01/15/2024    HDL 46.4 06/12/2023    HDL 45.6 06/09/2022     Lab Results   Component Value Date    LDLCALC 58 01/15/2024     Lab Results   Component Value Date    TRIG 181 (H) 01/15/2024    TRIG 138 06/12/2023    TRIG 129 06/09/2022       Chemistry    Lab Results   Component Value Date/Time     08/19/2024 1551    K 5.2 08/19/2024 1551    CL 98 08/19/2024 1551    CO2 29 08/19/2024 1551    BUN 56 (H) 08/19/2024 1551    CREATININE 2.41 (H) 08/19/2024 1551    Lab Results   Component Value Date/Time    CALCIUM 9.4 08/19/2024 1551    ALKPHOS 62 01/04/2023 0000    AST 14 01/15/2024 1542    ALT 12 01/15/2024 1542    BILITOT 0.6 01/04/2023 0000          No components found for: \"CHOLHDL\"         8/5/2024     1:10 PM 8/8/2024     2:50 PM 8/19/2024     1:59 PM 8/20/2024     1:06 PM 8/20/2024     1:07 PM 9/17/2024     2:01 PM 10/7/2024     1:23 PM   Vitals   Systolic 109 106 118 94 89 109 128   Diastolic 62 70 58 59 56 67 74   Heart Rate 62 68 106 60  64 64   Temp   36.3 °C (97.3 °F)    36.2 °C (97.2 °F)   Resp  16        Height (in)   1.727 m (5' 8\") 1.727 m (5' 8\")  1.727 m (5' 8\") 1.727 m (5' 8\")   Weight (lb) 200.8 199 199 199.1  193 197   BMI 31.45 kg/m2 31.17 kg/m2 30.26 kg/m2 " 30.27 kg/m2  29.35 kg/m2 29.95 kg/m2   BSA (m2) 2.08 m2 2.07 m2 2.08 m2 2.08 m2  2.05 m2 2.07 m2   Visit Report Report Report Report Report Report Report Report       Mental Status Examination  General Appearance: Well groomed, appropriate eye contact.  Gait/Station: Within normal limits  Speech: uses cane for stability   Mood: anxious about upcoming move   Affect: Euthymic, full-range  Thought Process: Linear, goal directed  Thought Associations: No loosening of associations  Thought Content: normal  Perception: No perceptual abnormalities noted  Level of Consciousness: Alert  Orientation: Alert and oriented to person, place, time and situation  Attention and Concentration: Intact  Recent Memory: Intact as evidenced by ability to recall details from the past 24 hours   Remote Memory: Intact as evidenced by ability to recall previous medical issues   Executive function: Intact  Language: Naming intact  Fund of knowledge: Good  Insight: intact  Judgment: intact         Assessment/Plan   This 89 y/o male has a h/o of one depressive episode (jan 2017) in the setting of low Na and stress. He had not previous mood struggles- currently appropriate bereavement regarding loss of wife.  He reports mood is ok, but he has anxiety related to upcoming .  He is now taking remeron, trazadone, melatonin . Sleep is pretty good most nights, but there are some episodes of middle insomnia. . No hypnopompic hallucinations.  There are no acute safety issues such as SI/HI.       dx: depression, unspecified,  ; insomnia ; bereavement, uncomplicated      1) continue mirtazapine  15  mg at night and evaluate for mood improvement,  trazadone 25-50 mg at night, melatonin used as needed,  taking balance of nature vitamins  2) follow up  8 weeks or earlier if needed   3) labs reviewed, worsening renal function - lasix cut down   4) safety plan reviewed - if there are thoughts of self harm, concerning side effects or significant symptoms  worsening report to the ER, call [phone].        [id number]- 17 minutes -supportive counseling provided- Jf continues to be anxious about upcoming move. His daughter is managing his move to [place] and reassures him that she will take care of everything. Sad to leave his home that he shared with his wife.  Realizes he can not do as much as he has pain with standing and walking. He is able to appreciate that there benefits of moving to [facility].   -concern that he may not pass driving test due to vision issues (central vision) . 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  Medication risks/benefit reviewed with the patient  Psychiatric Risk Assessment  Violence Risk Assessment: male  Acute Risk of Harm to Others is Considered: low   Suicide Risk Assessment: age > 65 yrs old  Protective Factors against Suicide: adherence to  treatment, hopefulness/future orientation, moral objections to suicide, positive family relationships, [facility] affiliation/spirituality, sense of responsibility toward family, social support/connectedness, and strong coping skills  Acute Risk of Harm to Self is Considered: low        Bee Hoyt, DO

## 2024-10-07 NOTE — PATIENT INSTRUCTIONS
1) continue  remeron 15  mg in the evenings,  trazadone 25-50 mg at night as needed for sleep  2) I will see you in about 2  months    -If there are any thoughts of harming your self, harming others, concerning worsening of your mental health symptoms or concerning medication side effects, please call [phone] or reports to the nearest emergency room.

## 2024-10-20 VITALS
DIASTOLIC BLOOD PRESSURE: 70 MMHG | BODY MASS INDEX: 29.04 KG/M2 | SYSTOLIC BLOOD PRESSURE: 104 MMHG | HEART RATE: 60 BPM | RESPIRATION RATE: 16 BRPM | WEIGHT: 191 LBS

## 2024-10-20 PROBLEM — D69.6 THROMBOCYTOPENIA (CMS-HCC): Status: RESOLVED | Noted: 2023-06-06 | Resolved: 2024-10-20

## 2024-10-20 ASSESSMENT — ENCOUNTER SYMPTOMS
DIZZINESS: 1
CONSTITUTIONAL NEGATIVE: 1
CARDIOVASCULAR NEGATIVE: 1
FEVER: 0
HEADACHES: 0
RESPIRATORY NEGATIVE: 1
WEAKNESS: 0
LIGHT-HEADEDNESS: 0

## 2024-10-30 ENCOUNTER — APPOINTMENT (OUTPATIENT)
Dept: OTOLARYNGOLOGY | Facility: CLINIC | Age: 89
End: 2024-10-30
Payer: COMMERCIAL

## 2024-11-06 ENCOUNTER — APPOINTMENT (OUTPATIENT)
Dept: OTOLARYNGOLOGY | Facility: CLINIC | Age: 89
End: 2024-11-06
Payer: MEDICARE

## 2024-11-06 VITALS — BODY MASS INDEX: 29.86 KG/M2 | HEIGHT: 68 IN | WEIGHT: 197 LBS

## 2024-11-06 DIAGNOSIS — H93.92: Primary | ICD-10-CM

## 2024-11-06 PROCEDURE — 1160F RVW MEDS BY RX/DR IN RCRD: CPT | Performed by: OTOLARYNGOLOGY

## 2024-11-06 PROCEDURE — 99213 OFFICE O/P EST LOW 20 MIN: CPT | Performed by: OTOLARYNGOLOGY

## 2024-11-06 PROCEDURE — 1159F MED LIST DOCD IN RCRD: CPT | Performed by: OTOLARYNGOLOGY

## 2024-11-06 PROCEDURE — 69220 CLEAN OUT MASTOID CAVITY: CPT | Performed by: OTOLARYNGOLOGY

## 2024-11-06 PROCEDURE — 1036F TOBACCO NON-USER: CPT | Performed by: OTOLARYNGOLOGY

## 2024-11-06 ASSESSMENT — PATIENT HEALTH QUESTIONNAIRE - PHQ9
SUM OF ALL RESPONSES TO PHQ9 QUESTIONS 1 AND 2: 0
2. FEELING DOWN, DEPRESSED OR HOPELESS: NOT AT ALL
2. FEELING DOWN, DEPRESSED OR HOPELESS: NOT AT ALL
SUM OF ALL RESPONSES TO PHQ9 QUESTIONS 1 AND 2: 0
1. LITTLE INTEREST OR PLEASURE IN DOING THINGS: NOT AT ALL
1. LITTLE INTEREST OR PLEASURE IN DOING THINGS: NOT AT ALL

## 2024-11-22 DIAGNOSIS — R06.09 DYSPNEA ON EXERTION: ICD-10-CM

## 2024-11-22 RX ORDER — LOSARTAN POTASSIUM 25 MG/1
25 TABLET ORAL DAILY
Qty: 100 TABLET | Refills: 3 | Status: SHIPPED | OUTPATIENT
Start: 2024-11-22

## 2024-11-22 RX ORDER — LOSARTAN POTASSIUM 25 MG/1
25 TABLET ORAL DAILY
Qty: 100 TABLET | Refills: 3 | Status: CANCELLED | OUTPATIENT
Start: 2024-11-22

## 2024-11-25 DIAGNOSIS — I50.9 CONGESTIVE HEART FAILURE, UNSPECIFIED HF CHRONICITY, UNSPECIFIED HEART FAILURE TYPE: ICD-10-CM

## 2024-11-25 RX ORDER — SPIRONOLACTONE 25 MG/1
25 TABLET ORAL DAILY
Qty: 90 TABLET | Refills: 3 | Status: SHIPPED | OUTPATIENT
Start: 2024-11-25

## 2024-12-02 ENCOUNTER — APPOINTMENT (OUTPATIENT)
Dept: BEHAVIORAL HEALTH | Facility: CLINIC | Age: 89
End: 2024-12-02
Payer: MEDICARE

## 2024-12-02 VITALS
TEMPERATURE: 97.6 F | SYSTOLIC BLOOD PRESSURE: 146 MMHG | HEART RATE: 66 BPM | BODY MASS INDEX: 30.22 KG/M2 | HEIGHT: 68 IN | DIASTOLIC BLOOD PRESSURE: 76 MMHG | WEIGHT: 199.4 LBS

## 2024-12-02 DIAGNOSIS — F41.9 ANXIETY DISORDER, UNSPECIFIED TYPE: ICD-10-CM

## 2024-12-02 DIAGNOSIS — F43.21 ADJUSTMENT DISORDER WITH DEPRESSED MOOD: ICD-10-CM

## 2024-12-02 DIAGNOSIS — G47.00 INSOMNIA, UNSPECIFIED TYPE: ICD-10-CM

## 2024-12-02 PROCEDURE — 3077F SYST BP >= 140 MM HG: CPT | Performed by: PSYCHIATRY & NEUROLOGY

## 2024-12-02 PROCEDURE — 99214 OFFICE O/P EST MOD 30 MIN: CPT | Performed by: PSYCHIATRY & NEUROLOGY

## 2024-12-02 PROCEDURE — 1036F TOBACCO NON-USER: CPT | Performed by: PSYCHIATRY & NEUROLOGY

## 2024-12-02 PROCEDURE — 90833 PSYTX W PT W E/M 30 MIN: CPT | Performed by: PSYCHIATRY & NEUROLOGY

## 2024-12-02 PROCEDURE — 3078F DIAST BP <80 MM HG: CPT | Performed by: PSYCHIATRY & NEUROLOGY

## 2024-12-02 PROCEDURE — 1126F AMNT PAIN NOTED NONE PRSNT: CPT | Performed by: PSYCHIATRY & NEUROLOGY

## 2024-12-02 RX ORDER — TRAZODONE HYDROCHLORIDE 50 MG/1
50 TABLET ORAL NIGHTLY
Qty: 90 TABLET | Refills: 0 | Status: SHIPPED | OUTPATIENT
Start: 2024-12-02 | End: 2025-03-02

## 2024-12-02 RX ORDER — MIRTAZAPINE 15 MG/1
15 TABLET, FILM COATED ORAL NIGHTLY
Qty: 90 TABLET | Refills: 0 | Status: SHIPPED | OUTPATIENT
Start: 2024-12-02

## 2024-12-02 ASSESSMENT — PAIN SCALES - GENERAL: PAINLEVEL_OUTOF10: 0-NO PAIN

## 2024-12-02 NOTE — PATIENT INSTRUCTIONS
1) continue  remeron 15  mg in the evenings,  increase trazadone to 50 mg at night as needed for sleep  2) I will see you in about 2  months    -If there are any thoughts of harming your self, harming others, concerning worsening of your mental health symptoms or concerning medication side effects, please call [phone] or reports to the nearest emergency room.

## 2024-12-02 NOTE — PROGRESS NOTES
Outpatient Psychiatry        Reason for Visit: follow up for depression, unspecified, remission ; insomnia ; bereavement, uncomplicated .  -living at [facility] [place]LLEN[place] no longer drives. Not sure he will be able to have transportation to see me on [place], he may try to learn to use video       HPI: 89 y/o  male who presents for F/U regarding depression and insomnia. Sees children once per week.  States he is awaking at 2 am and can not fall back to sleep. He then lays in bed until 6 am.   He plans to buy motorized wheel chair.       Started OT and working on strengthening legs   There is frustration that he is now dependent on others for transportation.   Tries to stay active and socializes.   Feels sad daily as he is alone, misses wife, . Facing decline in physical abilities/stamina.     no death wish or SI.   There have been no hallucinations.   He is taking mirtazapine, melatonin nightly and trazadone.   nocturia awakens him at night.   Uses a cane , gets tired when walking. Back hurts with standing/walking   no new health issues. No falls     past psych h/o: previously following with Angelia Cuenca NP. Began to see her after inpatient admission [id number] for depression and SI. No other psych tx. He has been on celexa, seroquel, trazadone, stoppped cymbalta 30 mg 2018. Took ambien for 10 years until 2017.      chem dep: no substance abuse h/o, but has one drink per night (1.5 ounces of liquor or 8 oz of wine).      family psych h/o: mom- alcoholic, brother - alcohol abuse and his 2 kids drink heavily.      past med h/o: glaucoma, HTN, GERD, HLD, h/o prostate CA, cardiomyopathy, B/L TKA   all: sulfa, PCN, wellbutrin (hives)      social h/o: raised by mom and step-dad. Finished hs and straight to college - [employer]. Worked in , retired .  X2 . Currently  43 years. He has 3 bio sons- one son  from substance abuse in . One step daughter. All living children are in the area and  they have close relationships. He sees his grandchildren at family gatherings. He loves sports and curls. . He enjoys activities. 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 Played football along with other sports.          Current Medications:    Current Outpatient Medications:     aspirin 81 mg EC tablet, Take 1 tablet (81 mg) by mouth every other day., Disp: , Rfl:     furosemide (Lasix) 20 mg tablet, Take 4 tablets (80 mg) by mouth once daily., Disp: 120 tablet, Rfl: 0    losartan (Cozaar) 25 mg tablet, Take 1 tablet (25 mg) by mouth once daily., Disp: 100 tablet, Rfl: 3    melatonin 10 mg capsule, Take 1 capsule (10 mg) by mouth once daily at bedtime., Disp: , Rfl:     metoprolol succinate XL (Toprol-XL) 100 mg 24 hr tablet, TAKE 1 TABLET BY MOUTH TWICE  DAILY, Disp: 180 tablet, Rfl: 3    mirtazapine (Remeron) 15 mg tablet, TAKE 1 TABLET BY MOUTH ONCE  DAILY AT BEDTIME, Disp: 90 tablet, Rfl: 0    multivit-iron-minerals-folic acid (Centrum Silver) 0.4 mg-300 mcg- 250 mcg tab, Take 1 tablet by mouth once daily., Disp: , Rfl:     nepafenac (Nevanac) 0.1 % ophthalmic suspension, Administer 1 drop into affected eye(s) 3 times a day., Disp: , Rfl:     simvastatin (Zocor) 20 mg tablet, TAKE 1 TABLET BY MOUTH IN THE  EVENING, Disp: 90 tablet, Rfl: 3    spironolactone (Aldactone) 25 mg tablet, Take 1 tablet (25 mg) by mouth once daily., Disp: 90 tablet, Rfl: 3    timolol (Timoptic) 0.5 % ophthalmic solution, Administer into affected eye(s) every 12 hours., Disp: , Rfl:     traZODone (Desyrel) 50 mg tablet, Take 0.5 tablets (25 mg) by mouth once daily at bedtime., Disp: 45 tablet, Rfl: 1  Medical History:  Past Medical History:   Diagnosis Date    Acquired trigger finger 06/06/2023    Acute bronchitis 06/06/2023    Acute depression 06/06/2023    Anxiety in acute stress reaction 06/06/2023    Bacterial pneumonia 06/06/2023    Blepharitis 06/06/2023    Chronic otitis media of left ear 06/06/2023    Conjunctivitis  06/06/2023    Cough 06/06/2023    Disease due to severe acute respiratory syndrome coronavirus 2 (SARS-CoV-2) 06/06/2023    Dysphonia 05/15/2019    Dysphonia    Encounter for debridement of left postmastoidectomy cavity 06/06/2023    Exposure to COVID-19 virus 06/06/2023    Fever of unknown origin 06/06/2023    GERD (gastroesophageal reflux disease) 06/20/2023    Heart failure with preserved left ventricular function (HFpEF) 06/06/2023    Hip pain, left 06/06/2023    Hyperlipidemia 06/20/2023    Impacted cerumen of left ear 06/06/2023    Lightheadedness 06/06/2023    Middle ear infection, chronic 06/06/2023    Osteoarthritis of knee, unspecified 05/16/2016    Osteoarthritis of knee    Other conditions influencing health status     Prostate Cancer    Other conditions influencing health status     Colonoscopy (Fiberoptic)    Other specified postprocedural states 11/03/2018    Status post cardiac catheterization    Personal history of other diseases of the circulatory system 02/15/2022    History of cardiomyopathy    Personal history of other diseases of the circulatory system     History of mitral valve disorder    Personal history of other diseases of the circulatory system     History of hypertension    Personal history of other diseases of the digestive system     History of esophageal reflux    Personal history of other endocrine, nutritional and metabolic disease     History of hyperlipidemia    Senile ectropion of left lower eyelid 03/07/2018    Formatting of this note might be different from the original. Added automatically from request for surgery [id number]    Severe single current episode of major depressive disorder, with psychotic features (Multi) 06/06/2023    Spontaneous rupture of extensor tendons, left hand 12/15/2017    Nontraumatic rupture of sagittal band of extensor tendon of left upper extremity    Spontaneous rupture of extensor tendons, right hand 11/17/2017    Nontraumatic rupture of sagittal  band of extensor tendon of right upper extremity    Unspecified systolic (congestive) heart failure 09/26/2022    HFrEF (heart failure with reduced ejection fraction)     Surgical History:  Past Surgical History:   Procedure Laterality Date    ANKLE ARTHROSCOPY W/ OPEN REPAIR  10/23/2013    Ankle Repair    APPENDECTOMY  10/23/2013    Appendectomy    CATARACT EXTRACTION  10/23/2013    Cataract Surgery    OTHER SURGICAL HISTORY  10/23/2013    Mastoidectomy    TOTAL KNEE ARTHROPLASTY  02/04/2014    Total Knee Arthroplasty     Family History:  Family History   Problem Relation Name Age of Onset    Alcohol abuse Mother      No Known Problems Father      Heart disease Brother          Age 53    Other (Age 95) Brother      No Known Problems Daughter      No Known Problems Son      No Known Problems Son      Other (Drug overdose) Son       Social History:  Social History     Socioeconomic History    Marital status:      Spouse name: Not on file    Number of children: Not on file    Years of education: Not on file    Highest education level: Not on file   Occupational History    Not on file   Tobacco Use    Smoking status: Never     Passive exposure: Never    Smokeless tobacco: Never   Substance and Sexual Activity    Alcohol use: Yes     Comment: rare    Drug use: Never    Sexual activity: Not on file   Other Topics Concern    Not on file   Social History Narrative    Not on file     Social Drivers of Health     Financial Resource Strain: Not on file   Food Insecurity: Not on file   Transportation Needs: Not on file   Physical Activity: Not on file   Stress: Not on file   Social Connections: Not on file   Intimate Partner Violence: Not on file   Housing Stability: Not on file       Medical Review Of Systems:  Pertinent items are noted in HPI.    Psychiatric Review Of Systems:  Psychiatric ROS - Adult  Anxiety: Negative  Depression: sad at times due to loss of wife  Delirium: negative  Psychosis: negative  Jenn:  "negative  Safety Issues: none    Disordered Eating Symptoms:n/a  Inattentive Symptoms:n/a   Hyperactive/Impulsive Symptoms:n/a    Trauma Symptoms:n/a       /76   Pulse 66   Temp 36.4 °C (97.6 °F)   Ht 1.727 m (5' 8\")   Wt 90.4 kg (199 lb 6.4 oz)   BMI 30.32 kg/m²      LABS  Lab Results   Component Value Date    TSH 3.92 01/15/2024    25 60.2 04/08/2022    LHNGGLBJ30 417 10/25/2022    HDL 43.0 01/15/2024      Lab Results   Component Value Date    CHOL 137 01/15/2024    CHOL 145 06/12/2023    CHOL 145 06/09/2022     Lab Results   Component Value Date    HDL 43.0 01/15/2024    HDL 46.4 06/12/2023    HDL 45.6 06/09/2022     Lab Results   Component Value Date    LDLCALC 58 01/15/2024     Lab Results   Component Value Date    TRIG 181 (H) 01/15/2024    TRIG 138 06/12/2023    TRIG 129 06/09/2022       Chemistry    Lab Results   Component Value Date/Time     08/19/2024 1551    K 5.2 08/19/2024 1551    CL 98 08/19/2024 1551    CO2 29 08/19/2024 1551    BUN 56 (H) 08/19/2024 1551    CREATININE 2.41 (H) 08/19/2024 1551    Lab Results   Component Value Date/Time    CALCIUM 9.4 08/19/2024 1551    ALKPHOS 62 01/04/2023 0000    AST 14 01/15/2024 1542    ALT 12 01/15/2024 1542    BILITOT 0.6 01/04/2023 0000          No components found for: \"CHOLHDL\"         8/20/2024     1:06 PM 8/20/2024     1:07 PM 9/17/2024     2:01 PM 10/1/2024     2:50 PM 10/7/2024     1:23 PM 11/6/2024    11:57 AM 12/2/2024     2:54 PM   Vitals   Systolic 94 89 109 104 128  146   Diastolic 59 56 67 70 74  76   BP Location Left arm Right arm Right arm       Heart Rate 60  64 60 64  66   Temp     36.2 °C (97.2 °F)  36.4 °C (97.6 °F)   Resp    16      Height 1.727 m (5' 8\")  1.727 m (5' 8\")  1.727 m (5' 8\") 1.727 m (5' 8\") 1.727 m (5' 8\")   Weight (lb) 199.1  193 191 197 197 199.4   BMI 30.27 kg/m2  29.35 kg/m2 29.04 kg/m2 29.95 kg/m2 29.95 kg/m2 30.32 kg/m2   BSA (m2) 2.08 m2  2.05 m2 2.04 m2 2.07 m2 2.07 m2 2.08 m2   Visit Report Report " Report Report Report Report Report Report       Mental Status Examination  General Appearance: Well groomed, appropriate eye contact.  Gait/Station: Within normal limits  Speech: uses cane for stability   Mood: anxious about upcoming move   Affect: Euthymic, full-range  Thought Process: Linear, goal directed  Thought Associations: No loosening of associations  Thought Content: normal  Perception: No perceptual abnormalities noted  Level of Consciousness: Alert  Orientation: Alert and oriented to person, place, time and situation  Attention and Concentration: Intact  Recent Memory: Intact as evidenced by ability to recall details from the past 24 hours   Remote Memory: Intact as evidenced by ability to recall previous medical issues   Executive function: Intact  Language: Naming intact  Fund of knowledge: Good  Insight: intact  Judgment: intact         Assessment/Plan   This 89 y/o male has a h/o of one depressive episode (jan 2017) in the setting of low Na and stress. He had not previous mood struggles. There is sadness related to bereavement.   He is now taking remeron, trazadone, melatonin. Complains of middle insomnia. No hypnopompic hallucinations.  There are no acute safety issues such as SI/HI.       dx: depression, unspecified,  ; insomnia ; bereavement, uncomplicated      1) continue mirtazapine  15  mg at night and evaluate for mood improvement,  increase trazadone to 50 mg at night as needed , melatonin used as needed,  taking balance of nature vitamins  2) follow up  8 weeks or earlier if needed   3) labs reviewed, worsening renal function - lasix cut down   4) safety plan reviewed - if there are thoughts of self harm, concerning side effects or significant symptoms worsening report to the ER, call [phone]. [id number]- [phone] minutes -supportive counseling provided- Jf has moved to hospitals. He is still adjusting and not sure how much he likes it. He no longer is driving which is frustrating. He can use  "uber if needed. He has met new people at his [facility]. He is attending activities. Frustrated with middle insomnia. .   It is hard to be around \"old\" people.   Keeping an open mind toward meeting people and activities.       Review with patient: Treatment plan reviewed with the patient.  Medication risks/benefit reviewed with the patient  Psychiatric Risk Assessment  Violence Risk Assessment: male  Acute Risk of Harm to Others is Considered: low   Suicide Risk Assessment: age > 65 yrs old  Protective Factors against Suicide: adherence to  treatment, hopefulness/future orientation, moral objections to suicide, positive family relationships, Spiritism affiliation/spirituality, sense of responsibility toward family, social support/connectedness, and strong coping skills  Acute Risk of Harm to Self is Considered: low        Bee Hoyt,   "

## 2024-12-03 ENCOUNTER — APPOINTMENT (OUTPATIENT)
Dept: PRIMARY CARE | Facility: CLINIC | Age: 89
End: 2024-12-03
Payer: MEDICARE

## 2024-12-16 ENCOUNTER — LAB (OUTPATIENT)
Dept: LAB | Facility: LAB | Age: 89
End: 2024-12-16
Payer: MEDICARE

## 2024-12-16 ENCOUNTER — OFFICE VISIT (OUTPATIENT)
Dept: CARDIOLOGY | Facility: CLINIC | Age: 89
End: 2024-12-16
Payer: MEDICARE

## 2024-12-16 VITALS
HEIGHT: 67 IN | DIASTOLIC BLOOD PRESSURE: 72 MMHG | RESPIRATION RATE: 20 BRPM | HEART RATE: 60 BPM | BODY MASS INDEX: 31.39 KG/M2 | OXYGEN SATURATION: 94 % | SYSTOLIC BLOOD PRESSURE: 122 MMHG | WEIGHT: 200 LBS

## 2024-12-16 DIAGNOSIS — I50.9 CONGESTIVE HEART FAILURE, UNSPECIFIED HF CHRONICITY, UNSPECIFIED HEART FAILURE TYPE: ICD-10-CM

## 2024-12-16 LAB
ALBUMIN SERPL BCP-MCNC: 3.9 G/DL (ref 3.4–5)
ANION GAP SERPL CALC-SCNC: 14 MMOL/L (ref 10–20)
BNP SERPL-MCNC: 194 PG/ML (ref 0–99)
BUN SERPL-MCNC: 34 MG/DL (ref 6–23)
CALCIUM SERPL-MCNC: 9.4 MG/DL (ref 8.6–10.6)
CHLORIDE SERPL-SCNC: 99 MMOL/L (ref 98–107)
CO2 SERPL-SCNC: 31 MMOL/L (ref 21–32)
CREAT SERPL-MCNC: 1.94 MG/DL (ref 0.5–1.3)
EGFRCR SERPLBLD CKD-EPI 2021: 31 ML/MIN/1.73M*2
ERYTHROCYTE [DISTWIDTH] IN BLOOD BY AUTOMATED COUNT: 13 % (ref 11.5–14.5)
GLUCOSE SERPL-MCNC: 110 MG/DL (ref 74–99)
HCT VFR BLD AUTO: 35.7 % (ref 41–52)
HGB BLD-MCNC: 11.4 G/DL (ref 13.5–17.5)
MAGNESIUM SERPL-MCNC: 2.31 MG/DL (ref 1.6–2.4)
MCH RBC QN AUTO: 30.9 PG (ref 26–34)
MCHC RBC AUTO-ENTMCNC: 31.9 G/DL (ref 32–36)
MCV RBC AUTO: 97 FL (ref 80–100)
NRBC BLD-RTO: 0 /100 WBCS (ref 0–0)
PHOSPHATE SERPL-MCNC: 4 MG/DL (ref 2.5–4.9)
PLATELET # BLD AUTO: 146 X10*3/UL (ref 150–450)
POTASSIUM SERPL-SCNC: 4.8 MMOL/L (ref 3.5–5.3)
RBC # BLD AUTO: 3.69 X10*6/UL (ref 4.5–5.9)
SODIUM SERPL-SCNC: 139 MMOL/L (ref 136–145)
WBC # BLD AUTO: 8.3 X10*3/UL (ref 4.4–11.3)

## 2024-12-16 PROCEDURE — 36415 COLL VENOUS BLD VENIPUNCTURE: CPT

## 2024-12-16 PROCEDURE — 85027 COMPLETE CBC AUTOMATED: CPT

## 2024-12-16 PROCEDURE — 83735 ASSAY OF MAGNESIUM: CPT

## 2024-12-16 PROCEDURE — 3078F DIAST BP <80 MM HG: CPT | Performed by: INTERNAL MEDICINE

## 2024-12-16 PROCEDURE — 99214 OFFICE O/P EST MOD 30 MIN: CPT | Performed by: INTERNAL MEDICINE

## 2024-12-16 PROCEDURE — 3074F SYST BP LT 130 MM HG: CPT | Performed by: INTERNAL MEDICINE

## 2024-12-16 PROCEDURE — 83880 ASSAY OF NATRIURETIC PEPTIDE: CPT

## 2024-12-16 PROCEDURE — 80069 RENAL FUNCTION PANEL: CPT

## 2024-12-16 PROCEDURE — 1159F MED LIST DOCD IN RCRD: CPT | Performed by: INTERNAL MEDICINE

## 2024-12-16 RX ORDER — FUROSEMIDE 20 MG/1
80 TABLET ORAL DAILY
Qty: 360 TABLET | Refills: 1 | Status: SHIPPED | OUTPATIENT
Start: 2024-12-16 | End: 2025-12-16

## 2024-12-16 ASSESSMENT — ENCOUNTER SYMPTOMS
FEVER: 0
DEPRESSION: 0
MYALGIAS: 0
MEMORY LOSS: 0
ALTERED MENTAL STATUS: 0
NAUSEA: 0
CONSTIPATION: 0
CHILLS: 0
ABDOMINAL PAIN: 0
HEMATURIA: 0
HEADACHES: 0
BLOATING: 0
HEMOPTYSIS: 0
VOMITING: 0
DIARRHEA: 0
FALLS: 0
COUGH: 0
DYSURIA: 0

## 2024-12-16 NOTE — PROGRESS NOTES
Chief Complaint   Patient presents with    Heart Failure        HPI  89 yo WM w/ h/o mild CM, HFrEF -> HFpEF, pulm HTN, mild CAD, HTN, HPL, CKD, FHx CAD now here for cardiology f/u. No further LH/dizzy on reduced meds (Imdur stopped).   No chest pain. No sig dyspnea at rest. +ch HANKINS (worst after bending over), prev no sig improvement on increased diuretics. +occ mild orthopnea/wheeze (more when 1st getting into bed and resolves after seconds). No PND. No palps. No further LH/dizziness. No syncope. +ch dependent LE edema. No claudication. +occ cough (no change on allergy med or PPI). +occ fatigue. +occ snoring (when on back). +occ B arm weakness x 30-60 seconds. +ch foot numb. +easy bleeding. Wgt remains up.  BP at home (occ checked): usually <100/60 (occ up to 130/70), HR 60s (rare 90s)  Wgt at home: ~193 lbs (base ~185 lbs)  ECG 6/17: SR (70), 1st deg AVB, LAFB  ECG 12/17: SR (81), LAFB, LVH w/ ST changes  ECG 12/18: SR (64), 1st deg AVB, LAFB, LVH  ECG 1/20: SB (58), 1st deg AVB, LBBB  ECG 6/22: SB (57), IVCD  ECG 1/23: SR (64), IVCD, PVCs/bigeminy  ECG 8/24: SR (60), 1st deg AVB, LBBB  HM 9/24: SR, HR  (avg 62), VT [suspect SVT w/ aber] (long 9b)  Echo 11/17: EF 35-40%, ecc LVH, DD, LAE, mild MR, mild AI  Echo 4/21: EF 50-55%, DD, mod LAE, valves ok  CCS 12/17: 744 (LM 0, , LCx 0, ), small R pleural effusion, sm-mod HH  Cath 5/18: pLAD 30%, pRCA 10-30%, LVEDP 22-27, PCW 22, PA 60/26/41, CO 5.3, CI 2.6, no shunt, no AS  CXR 1/17: no acute abnl  CXR 3/19: HH, subseg atelectasis at LL base, otherwise lungs clear  CXR 5/24: tr L pl eff + atelectasis  CT brain 6/17: no acute abnl     Review of Systems   Constitutional: Negative for chills, fever and malaise/fatigue.   HENT:  Negative for hearing loss.    Eyes:  Negative for visual disturbance.   Respiratory:  Negative for cough and hemoptysis.    Skin:  Negative for rash.   Musculoskeletal:  Negative for falls and myalgias.   Gastrointestinal:   "Negative for bloating, abdominal pain, constipation, diarrhea, dysphagia, nausea and vomiting.   Genitourinary:  Negative for dysuria and hematuria.   Neurological:  Negative for headaches.   Psychiatric/Behavioral:  Negative for altered mental status, depression and memory loss.       Social History     Tobacco Use    Smoking status: Never     Passive exposure: Never    Smokeless tobacco: Never   Substance Use Topics    Alcohol use: Yes     Comment: rare      Family History   Problem Relation Name Age of Onset    Alcohol abuse Mother      No Known Problems Father      Heart disease Brother          Age 53    Other (Age 95) Brother      No Known Problems Daughter      No Known Problems Son      No Known Problems Son      Other (Drug overdose) Son        Allergies   Allergen Reactions    Penicillins Hives and Swelling    Sulfa (Sulfonamide Antibiotics) Hives and Swelling    Wellbutrin [Bupropion Hcl] Hives      Current Outpatient Medications   Medication Instructions    aspirin 81 mg EC tablet 1 tablet, Every other day    furosemide (LASIX) 80 mg, oral, Daily    losartan (COZAAR) 25 mg, oral, Daily    melatonin 10 mg capsule 1 capsule, Nightly    metoprolol succinate XL (TOPROL-XL) 100 mg, oral, 2 times daily    mirtazapine (REMERON) 15 mg, oral, Nightly    multivit-iron-minerals-folic acid (Centrum Silver) 0.4 mg-300 mcg- 250 mcg tab 1 tablet, Daily    nepafenac (Nevanac) 0.1 % ophthalmic suspension 1 drop, 3 times daily    simvastatin (ZOCOR) 20 mg, oral, Nightly    spironolactone (ALDACTONE) 25 mg, oral, Daily    timolol (Timoptic) 0.5 % ophthalmic solution Every 12 hours    traZODone (DESYREL) 50 mg, oral, Nightly      /72 (BP Location: Right arm, Patient Position: Sitting)   Pulse 60   Resp 20   Ht 1.702 m (5' 7\")   Wt 90.7 kg (200 lb)   SpO2 94%   BMI 31.32 kg/m²       Physical Exam  Constitutional:       Appearance: Normal appearance.   HENT:      Head: Normocephalic and atraumatic.      Nose: Nose " normal.   Cardiovascular:      Rate and Rhythm: Normal rate and regular rhythm.      Heart sounds: No murmur heard.  Pulmonary:      Effort: Pulmonary effort is normal.      Breath sounds: Examination of the right-lower field reveals rales. Examination of the left-lower field reveals rales. Rales present.   Abdominal:      Palpations: Abdomen is soft.      Tenderness: There is no abdominal tenderness.   Musculoskeletal:      Right lower le+ Edema present.      Left lower le+ Edema present.   Skin:     General: Skin is warm and dry.   Neurological:      General: No focal deficit present.      Mental Status: He is alert.   Psychiatric:         Mood and Affect: Mood normal.         Judgment: Judgment normal.        Results/Data   Cr 2.41, K 5.2 (Lasix 100)   Cr 2.14, K 4.9   Cr 2.22, K 4.9, LDL 58, HGB 11.8, , TSH 3.92   Cr 1.53, K 4.8, ALT/AST nl, LDL 71, HDL 46, , Chol 145, HGB 12.5, , TSH 3.73   Cr 1.72, K 4.9, LFT nl, HGB 11.1,   10/22 Cr 1.35, K 4.3, LFT nl   Cr 1.62, K 4.8, Na 138, ALT/AST nl, LDL 74, HDL 46, , Chol 145, HGB 13.3, , TSH 3.48   Cr 1.56, K 4.6   Cr 2.02, K 5.2, LFT nl, HGB 12.9,   10/21 Cr 1.83, K 5.1   Cr 1.86, K 5.1, HGB 12.5,    Cr 2.33, K 4.8   Cr 3.02, K 4.3, Mg 2.24, BNP 39 (Lasix, Braymer, Metolazone)   Cr 2.38, K 4.6, BUN 52 (Lasix 80, Sprio 25)   Cr 2.02, K 5.1  3/21 HGB 13.4,    Cr 1.78, K 4.3, HGB 12.1, , BNP 96   Cr 1.4, K 4.7, HGB 11.9, ,    Cr 1.84, K 4.5   Cr 1.3, K 4.9, ALT/AST nl, LDL 77, HDL 48, TG 75, Chol 140, HGB 11.1, , TSH 5.2  10/19 Cr 1.7, K 4.6, ALT/AST nl, LDL 83, HDL 46, , Chol 157, HGB 13, , TSH 4.5, FT4 1.2   Cr 1.4, K 4.3  3/19 Cr 1.7, K 4.5, BNP 70   Cr 1.91, K 4.8, BNP 99   Cr 1.6, K 4.6, LFT nl, HGB 12.5, , HDL 48, Chol 182   Cr 1.49, K 4.4   Cr 1.4, K 4.8, LFT  nl, HGB 13.5, , BNP 59  4/18 Cr 1.3, K 5.2, HGB 12.8,   12/17 Cr 1.13, K 4.3,   12/17 Cr 0.9, K 3.8, HGB 13.4, , TSH 2.7  6/17 ALT/AST nl, LDL 91, HDL 59, , Chol 170  1/17 TPN neg     Assessment/Plan   89 yo WM w/ h/o mild CM, HFrEF -> HFpEF, pulm HTN, mild CAD, HTN, HPL, CKD, FHx CAD.   He has chronic decompensated CHF (HANKINS/edema). Problem is kidney gets irritated with more diuretics. May have to accept a balance between CHF and CKD.   CM essentially resolved on BB/ARB - EF low normal to very mild reduced at 50-55%.  L/R press elevated. Pulm HTN most likely sec to CHF. Consider PFTs (earlene if more dyspnea/wheezing) or can just try MDI (he prefers to hold off for now).   Okay to remain conservative in this elderly patient - try to minimize symptoms.  -continue ASA 81 qMWF (easy bleeding on qd)  -continue Metoprolol Succinate 100 bid  -continue Losartan 25 qd  -continue Spironolactone 25 qd  -increase Furosemide from 60 to 80 every day (he claims Bumex did not work for him; kidney prev irritated on Metolazone)  -continue Simva 20 qhs  -f/u 4 months (earlier if needed)     Mike Lao MD

## 2024-12-18 DIAGNOSIS — I50.9 CONGESTIVE HEART FAILURE, UNSPECIFIED HF CHRONICITY, UNSPECIFIED HEART FAILURE TYPE: ICD-10-CM

## 2024-12-18 RX ORDER — FUROSEMIDE 20 MG/1
TABLET ORAL
Qty: 400 TABLET | Refills: 3 | OUTPATIENT
Start: 2024-12-18

## 2024-12-20 ENCOUNTER — APPOINTMENT (OUTPATIENT)
Dept: CARDIOLOGY | Facility: CLINIC | Age: 89
End: 2024-12-20
Payer: MEDICARE

## 2025-01-06 ENCOUNTER — LAB (OUTPATIENT)
Dept: LAB | Facility: LAB | Age: OVER 89
End: 2025-01-06
Payer: MEDICARE

## 2025-01-06 ENCOUNTER — APPOINTMENT (OUTPATIENT)
Dept: NEPHROLOGY | Facility: CLINIC | Age: OVER 89
End: 2025-01-06
Payer: MEDICARE

## 2025-01-06 ENCOUNTER — OFFICE VISIT (OUTPATIENT)
Dept: PRIMARY CARE | Facility: CLINIC | Age: OVER 89
End: 2025-01-06
Payer: MEDICARE

## 2025-01-06 ENCOUNTER — HOSPITAL ENCOUNTER (OUTPATIENT)
Dept: RADIOLOGY | Facility: CLINIC | Age: OVER 89
Discharge: HOME | End: 2025-01-06
Payer: MEDICARE

## 2025-01-06 DIAGNOSIS — I27.20 PULMONARY HYPERTENSION (MULTI): ICD-10-CM

## 2025-01-06 DIAGNOSIS — I50.30 HEART FAILURE WITH PRESERVED EJECTION FRACTION, UNSPECIFIED HF CHRONICITY: ICD-10-CM

## 2025-01-06 DIAGNOSIS — N18.4 CKD (CHRONIC KIDNEY DISEASE), STAGE IV (MULTI): ICD-10-CM

## 2025-01-06 DIAGNOSIS — K21.9 GASTROESOPHAGEAL REFLUX DISEASE, UNSPECIFIED WHETHER ESOPHAGITIS PRESENT: ICD-10-CM

## 2025-01-06 DIAGNOSIS — K29.00 OTHER ACUTE GASTRITIS WITHOUT HEMORRHAGE: ICD-10-CM

## 2025-01-06 DIAGNOSIS — K22.70 BARRETT'S ESOPHAGUS WITHOUT DYSPLASIA: ICD-10-CM

## 2025-01-06 DIAGNOSIS — K29.00 OTHER ACUTE GASTRITIS WITHOUT HEMORRHAGE: Primary | ICD-10-CM

## 2025-01-06 DIAGNOSIS — K44.9 HIATAL HERNIA: ICD-10-CM

## 2025-01-06 DIAGNOSIS — R06.09 DOE (DYSPNEA ON EXERTION): ICD-10-CM

## 2025-01-06 LAB
ALBUMIN SERPL BCP-MCNC: 3.5 G/DL (ref 3.4–5)
ALP SERPL-CCNC: 66 U/L (ref 33–136)
ALT SERPL W P-5'-P-CCNC: 18 U/L (ref 10–52)
AMYLASE SERPL-CCNC: 39 U/L (ref 29–103)
ANION GAP SERPL CALC-SCNC: 16 MMOL/L (ref 10–20)
AST SERPL W P-5'-P-CCNC: 20 U/L (ref 9–39)
BASOPHILS # BLD AUTO: 0.04 X10*3/UL (ref 0–0.1)
BASOPHILS NFR BLD AUTO: 0.3 %
BILIRUB SERPL-MCNC: 0.3 MG/DL (ref 0–1.2)
BUN SERPL-MCNC: 26 MG/DL (ref 6–23)
CALCIUM SERPL-MCNC: 9.2 MG/DL (ref 8.6–10.6)
CHLORIDE SERPL-SCNC: 96 MMOL/L (ref 98–107)
CO2 SERPL-SCNC: 30 MMOL/L (ref 21–32)
CREAT SERPL-MCNC: 1.46 MG/DL (ref 0.5–1.3)
EGFRCR SERPLBLD CKD-EPI 2021: 43 ML/MIN/1.73M*2
EOSINOPHIL # BLD AUTO: 0.13 X10*3/UL (ref 0–0.4)
EOSINOPHIL NFR BLD AUTO: 1 %
ERYTHROCYTE [DISTWIDTH] IN BLOOD BY AUTOMATED COUNT: 12.8 % (ref 11.5–14.5)
GLUCOSE SERPL-MCNC: 115 MG/DL (ref 74–99)
HCT VFR BLD AUTO: 32 % (ref 41–52)
HGB BLD-MCNC: 10.3 G/DL (ref 13.5–17.5)
IMM GRANULOCYTES # BLD AUTO: 0.13 X10*3/UL (ref 0–0.5)
IMM GRANULOCYTES NFR BLD AUTO: 1 % (ref 0–0.9)
LIPASE SERPL-CCNC: 89 U/L (ref 9–82)
LYMPHOCYTES # BLD AUTO: 1.64 X10*3/UL (ref 0.8–3)
LYMPHOCYTES NFR BLD AUTO: 12.4 %
MCH RBC QN AUTO: 30.3 PG (ref 26–34)
MCHC RBC AUTO-ENTMCNC: 32.2 G/DL (ref 32–36)
MCV RBC AUTO: 94 FL (ref 80–100)
MONOCYTES # BLD AUTO: 1.05 X10*3/UL (ref 0.05–0.8)
MONOCYTES NFR BLD AUTO: 7.9 %
NEUTROPHILS # BLD AUTO: 10.27 X10*3/UL (ref 1.6–5.5)
NEUTROPHILS NFR BLD AUTO: 77.4 %
NRBC BLD-RTO: 0 /100 WBCS (ref 0–0)
PHOSPHATE SERPL-MCNC: 3.3 MG/DL (ref 2.5–4.9)
PLATELET # BLD AUTO: 289 X10*3/UL (ref 150–450)
POTASSIUM SERPL-SCNC: 4.8 MMOL/L (ref 3.5–5.3)
PROT SERPL-MCNC: 6.1 G/DL (ref 6.4–8.2)
RBC # BLD AUTO: 3.4 X10*6/UL (ref 4.5–5.9)
SODIUM SERPL-SCNC: 137 MMOL/L (ref 136–145)
WBC # BLD AUTO: 13.3 X10*3/UL (ref 4.4–11.3)

## 2025-01-06 PROCEDURE — 74019 RADEX ABDOMEN 2 VIEWS: CPT

## 2025-01-06 PROCEDURE — 82150 ASSAY OF AMYLASE: CPT

## 2025-01-06 PROCEDURE — 74019 RADEX ABDOMEN 2 VIEWS: CPT | Performed by: RADIOLOGY

## 2025-01-06 PROCEDURE — 84100 ASSAY OF PHOSPHORUS: CPT

## 2025-01-06 PROCEDURE — G2211 COMPLEX E/M VISIT ADD ON: HCPCS | Performed by: INTERNAL MEDICINE

## 2025-01-06 PROCEDURE — 83690 ASSAY OF LIPASE: CPT

## 2025-01-06 PROCEDURE — 80053 COMPREHEN METABOLIC PANEL: CPT

## 2025-01-06 PROCEDURE — 3075F SYST BP GE 130 - 139MM HG: CPT | Performed by: INTERNAL MEDICINE

## 2025-01-06 PROCEDURE — 83880 ASSAY OF NATRIURETIC PEPTIDE: CPT

## 2025-01-06 PROCEDURE — 3078F DIAST BP <80 MM HG: CPT | Performed by: INTERNAL MEDICINE

## 2025-01-06 PROCEDURE — 85025 COMPLETE CBC W/AUTO DIFF WBC: CPT

## 2025-01-06 PROCEDURE — 99214 OFFICE O/P EST MOD 30 MIN: CPT | Performed by: INTERNAL MEDICINE

## 2025-01-06 RX ORDER — OMEPRAZOLE 40 MG/1
40 CAPSULE, DELAYED RELEASE ORAL
Qty: 30 CAPSULE | Refills: 2 | Status: SHIPPED | OUTPATIENT
Start: 2025-01-06 | End: 2025-04-06

## 2025-01-06 RX ORDER — METOCLOPRAMIDE 10 MG/1
10 TABLET ORAL 4 TIMES DAILY PRN
Qty: 30 TABLET | Refills: 0 | Status: SHIPPED | OUTPATIENT
Start: 2025-01-06 | End: 2025-03-07

## 2025-01-06 NOTE — PATIENT INSTRUCTIONS
Gastritis/gastroenteritis-stomach and bowel irritation  Resume omeprazole 40 mg daily for acid reduction to settle your stomach  Metoclopramide/Reglan 10 mg before meals and at bedtime AS NEEDED FOR NAUSEA  Light meals with adequate fluids  Check x-ray of abdomen and chest for gas patterns  Check blood test for liver function, blood counts, pancreatic enzymes  Schedule ultrasound of abdomen to check liver gallbladder pancreas

## 2025-01-06 NOTE — PROGRESS NOTES
"Subjective   Patient ID: Jf Watts \"ZORAN" is a 89 y.o. male who presents for Follow-up.  HPI  Cold symptoms over 2 weeks   Stomach poor appetite Theraflu Peptobismol not helpful 1 week   At [place]   diarrhea occ black 1 week   No energy   No fever     Review of Systems   Constitutional:  Negative for fever.   HENT:  Positive for congestion, postnasal drip and rhinorrhea. Negative for sinus pressure, sore throat, trouble swallowing and voice change.    Respiratory: Negative.  Negative for cough, shortness of breath and wheezing.    Cardiovascular: Negative.    Gastrointestinal:  Positive for diarrhea. Negative for abdominal pain.       Objective   Physical Exam  Constitutional:       General: He is not in acute distress.     Appearance: Normal appearance.   HENT:      Nose: Congestion present.      Mouth/Throat:      Pharynx: Oropharynx is clear. No oropharyngeal exudate.   Neck:      Comments: No JVD or thyromegaly  Cardiovascular:      Rate and Rhythm: Normal rate and regular rhythm.      Pulses: Normal pulses.      Heart sounds: Normal heart sounds.   Pulmonary:      Effort: Pulmonary effort is normal.      Breath sounds: Normal breath sounds.   Abdominal:      General: Abdomen is flat. Bowel sounds are normal.      Palpations: Abdomen is soft. There is no mass.      Tenderness: There is no abdominal tenderness. There is no right CVA tenderness or left CVA tenderness.   Musculoskeletal:         General: No tenderness.      Right lower leg: Edema present.      Left lower leg: Edema present.      Comments: Compression stockings, nontender   Lymphadenopathy:      Cervical: No cervical adenopathy.   Skin:     Comments: Multiple keratosis and nevi without discrete rash/hives   Neurological:      General: No focal deficit present.      Mental Status: He is alert.      Gait: Gait abnormal.      Comments: Ambulatory with cane   Psychiatric:         Attention and Perception: Attention normal.         Mood and " Affect: Mood normal. Mood is not anxious or depressed. Affect is not blunt, angry or tearful.         Speech: Speech normal.         Behavior: Behavior is slowed. Behavior is not agitated. Behavior is cooperative.         Thought Content: Thought content normal.         Cognition and Memory: Cognition normal.      Comments: Comfortable and relaxed  Comfortable with the decision to move       /74   Pulse 64   Temp 36.4 °C (97.6 °F)   Resp 16     Data  Chest x-ray 5/17/2024  Lab 12/16/2024 H/H 11.4/36%, creatinine 1.9/GFR 31  Last EGD 2/2015 showed short-segment BE, otherwise normal    Assessment/Plan   Gastritis/gastroenteritis-stomach and bowel irritation  Resume omeprazole 40 mg daily for acid reduction to settle your stomach  Metoclopramide/Reglan 10 mg before meals and at bedtime AS NEEDED FOR NAUSEA  Light meals with adequate fluids  Check x-ray of abdomen and chest for gas patterns  Check blood test for liver function, blood counts, pancreatic enzymes  Schedule ultrasound of abdomen to check liver gallbladder pancreas    Problem List Items Addressed This Visit       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

## 2025-01-07 LAB — BNP SERPL-MCNC: 202 PG/ML (ref 0–99)

## 2025-01-10 ENCOUNTER — HOSPITAL ENCOUNTER (OUTPATIENT)
Dept: RADIOLOGY | Facility: CLINIC | Age: OVER 89
Discharge: HOME | End: 2025-01-10
Payer: MEDICARE

## 2025-01-10 DIAGNOSIS — K29.00 OTHER ACUTE GASTRITIS WITHOUT HEMORRHAGE: ICD-10-CM

## 2025-01-10 PROCEDURE — 76705 ECHO EXAM OF ABDOMEN: CPT

## 2025-01-13 ENCOUNTER — APPOINTMENT (OUTPATIENT)
Dept: NEPHROLOGY | Facility: CLINIC | Age: OVER 89
End: 2025-01-13
Payer: MEDICARE

## 2025-01-21 ENCOUNTER — APPOINTMENT (OUTPATIENT)
Dept: PRIMARY CARE | Facility: CLINIC | Age: OVER 89
End: 2025-01-21
Payer: MEDICARE

## 2025-01-29 DIAGNOSIS — K29.00 OTHER ACUTE GASTRITIS WITHOUT HEMORRHAGE: ICD-10-CM

## 2025-01-29 RX ORDER — OMEPRAZOLE 40 MG/1
40 CAPSULE, DELAYED RELEASE ORAL
Qty: 90 CAPSULE | Refills: 3 | Status: SHIPPED | OUTPATIENT
Start: 2025-01-29 | End: 2026-01-24

## 2025-02-05 ENCOUNTER — APPOINTMENT (OUTPATIENT)
Dept: PRIMARY CARE | Facility: CLINIC | Age: OVER 89
End: 2025-02-05
Payer: MEDICARE

## 2025-02-05 VITALS
SYSTOLIC BLOOD PRESSURE: 126 MMHG | BODY MASS INDEX: 30.7 KG/M2 | HEART RATE: 60 BPM | OXYGEN SATURATION: 95 % | DIASTOLIC BLOOD PRESSURE: 74 MMHG | WEIGHT: 196 LBS

## 2025-02-05 DIAGNOSIS — C61 ADENOCARCINOMA OF PROSTATE (MULTI): ICD-10-CM

## 2025-02-05 DIAGNOSIS — F33.2 SEVERE EPISODE OF RECURRENT MAJOR DEPRESSIVE DISORDER, WITHOUT PSYCHOTIC FEATURES (MULTI): ICD-10-CM

## 2025-02-05 DIAGNOSIS — I50.9 CONGESTIVE HEART FAILURE, UNSPECIFIED HF CHRONICITY, UNSPECIFIED HEART FAILURE TYPE: ICD-10-CM

## 2025-02-05 DIAGNOSIS — N18.32 STAGE 3B CHRONIC KIDNEY DISEASE (MULTI): ICD-10-CM

## 2025-02-05 PROCEDURE — 1160F RVW MEDS BY RX/DR IN RCRD: CPT | Performed by: INTERNAL MEDICINE

## 2025-02-05 PROCEDURE — 99214 OFFICE O/P EST MOD 30 MIN: CPT | Performed by: INTERNAL MEDICINE

## 2025-02-05 PROCEDURE — 3074F SYST BP LT 130 MM HG: CPT | Performed by: INTERNAL MEDICINE

## 2025-02-05 PROCEDURE — G2211 COMPLEX E/M VISIT ADD ON: HCPCS | Performed by: INTERNAL MEDICINE

## 2025-02-05 PROCEDURE — 3078F DIAST BP <80 MM HG: CPT | Performed by: INTERNAL MEDICINE

## 2025-02-05 PROCEDURE — 1159F MED LIST DOCD IN RCRD: CPT | Performed by: INTERNAL MEDICINE

## 2025-02-05 NOTE — PATIENT INSTRUCTIONS
Overall you are doing well today  Blood tests look favorable with improved kidney function  Anemia stable  Continue omeprazole indefinitely to protect your stomach  Ultrasound of the liver, gallbladder and pancreas was satisfactory  Kidneys with chronic stable changes

## 2025-02-10 ENCOUNTER — APPOINTMENT (OUTPATIENT)
Dept: NEPHROLOGY | Facility: CLINIC | Age: OVER 89
End: 2025-02-10
Payer: MEDICARE

## 2025-02-17 ENCOUNTER — APPOINTMENT (OUTPATIENT)
Dept: NEPHROLOGY | Facility: CLINIC | Age: OVER 89
End: 2025-02-17
Payer: MEDICARE

## 2025-03-03 ENCOUNTER — APPOINTMENT (OUTPATIENT)
Dept: BEHAVIORAL HEALTH | Facility: CLINIC | Age: OVER 89
End: 2025-03-03
Payer: MEDICARE

## 2025-03-03 DIAGNOSIS — F41.9 ANXIETY DISORDER, UNSPECIFIED TYPE: Primary | ICD-10-CM

## 2025-03-03 PROCEDURE — 1036F TOBACCO NON-USER: CPT | Performed by: PSYCHIATRY & NEUROLOGY

## 2025-03-03 PROCEDURE — 1160F RVW MEDS BY RX/DR IN RCRD: CPT | Performed by: PSYCHIATRY & NEUROLOGY

## 2025-03-03 PROCEDURE — 99214 OFFICE O/P EST MOD 30 MIN: CPT | Performed by: PSYCHIATRY & NEUROLOGY

## 2025-03-03 PROCEDURE — 1159F MED LIST DOCD IN RCRD: CPT | Performed by: PSYCHIATRY & NEUROLOGY

## 2025-03-03 RX ORDER — MIRTAZAPINE 15 MG/1
22.5 TABLET, FILM COATED ORAL NIGHTLY
Qty: 135 TABLET | Refills: 0 | Status: SHIPPED | OUTPATIENT
Start: 2025-03-03 | End: 2025-06-01

## 2025-03-03 NOTE — PROGRESS NOTES
Outpatient Psychiatry        Reason for Visit: follow up for depression, unspecified, remission ; insomnia ; bereavement, uncomplicated .  -living at [facility] [place] no longer drives. Not sure he will be able to have transportation to see me on [place], he may try to learn to use video       HPI: 89 y/o  male who presents for F/U regarding depression and insomnia. Sees children once per week.  Sleep has been a little better, still has some amount of middle insomnia - but able to go back to sleep.   Has motorized wheel chair. This helps a lot as there are long hallways where he resides. Able to do more      Exercising on his own as PT coverage stopped.   There is frustration that he is now dependent on others for transportation.   Tries to stay active and socializes.   Less daily sadness/grief.  Getting more used to schedule of where he lives.   He leased a new car which he is happy about it, feels more independent.   no death wish or SI.   There have been no hallucinations.    nocturia awakens him at night.   Uses a cane , gets tired when walking. Back hurts with standing/walking   no new health issues aside from recent GI flu. Notices arms fall asleep at times - will discuss with pcp.    No falls     past psych h/o: previously following with Angelia Cuenca NP. Began to see her after inpatient admission [place] 2017 for depression and SI. No other psych tx. He has been on celexa, seroquel, trazadone, stoppped cymbalta 30 mg 9/2018. Took ambien for 10 years until 2017.      chem dep: no substance abuse h/o, but has one drink per night (1.5 ounces of liquor or 8 oz of wine).      family psych h/o: mom- alcoholic, brother - alcohol abuse and his 2 kids drink heavily.      past med h/o: glaucoma, HTN, GERD, HLD, h/o prostate CA, cardiomyopathy, B/L TKA   all: sulfa, PCN, wellbutrin (hives)      social h/o: raised by mom and step-dad. Finished hs and straight to college - [employer]. Worked in , retired 1992.   X2 . Currently  43 years. He has 3 bio sons- one son  from substance abuse in . One step daughter. All living children are in the area and they have close relationships. He sees his grandchildren at family gatherings. He loves sports and curls. . He enjoys activities. He was in the  Olympics trial in track. Played football along with other sports.          Current Medications:    Current Outpatient Medications:     aspirin 81 mg EC tablet, Take 1 tablet (81 mg) by mouth every other day., Disp: , Rfl:     furosemide (Lasix) 20 mg tablet, Take 4 tablets (80 mg) by mouth once daily., Disp: 360 tablet, Rfl: 1    losartan (Cozaar) 25 mg tablet, Take 1 tablet (25 mg) by mouth once daily., Disp: 100 tablet, Rfl: 3    melatonin 10 mg capsule, Take 1 capsule (10 mg) by mouth once daily at bedtime., Disp: , Rfl:     metoclopramide (Reglan) 10 mg tablet, Take 1 tablet (10 mg) by mouth 4 times a day as needed (nausea)., Disp: 30 tablet, Rfl: 0    metoprolol succinate XL (Toprol-XL) 100 mg 24 hr tablet, TAKE 1 TABLET BY MOUTH TWICE  DAILY, Disp: 180 tablet, Rfl: 3    mirtazapine (Remeron) 15 mg tablet, Take 1 tablet (15 mg) by mouth once daily at bedtime., Disp: 90 tablet, Rfl: 0    multivit-iron-minerals-folic acid (Centrum Silver) 0.4 mg-300 mcg- 250 mcg tab, Take 1 tablet by mouth once daily., Disp: , Rfl:     nepafenac (Nevanac) 0.1 % ophthalmic suspension, Administer 1 drop into affected eye(s) 3 times a day., Disp: , Rfl:     omeprazole (PriLOSEC) 40 mg DR capsule, Take 1 capsule (40 mg) by mouth once daily in the morning. Take before meals. Do not crush or chew., Disp: 90 capsule, Rfl: 3    simvastatin (Zocor) 20 mg tablet, TAKE 1 TABLET BY MOUTH IN THE  EVENING, Disp: 80 tablet, Rfl: 3    spironolactone (Aldactone) 25 mg tablet, Take 1 tablet (25 mg) by mouth once daily., Disp: 90 tablet, Rfl: 3    timolol (Timoptic) 0.5 % ophthalmic solution, Administer into affected eye(s) every 12 hours.,  Disp: , Rfl:     traZODone (Desyrel) 50 mg tablet, TAKE 1 TABLET BY MOUTH ONCE  DAILY AT BEDTIME, Disp: 90 tablet, Rfl: 0  Medical History:  Past Medical History:   Diagnosis Date    Acquired trigger finger 06/06/2023    Acute bronchitis 06/06/2023    Acute depression 06/06/2023    Anxiety in acute stress reaction 06/06/2023    Bacterial pneumonia 06/06/2023    Blepharitis 06/06/2023    Chronic otitis media of left ear 06/06/2023    Conjunctivitis 06/06/2023    Cough 06/06/2023    Disease due to severe acute respiratory syndrome coronavirus 2 (SARS-CoV-2) 06/06/2023    Dysphonia 05/15/2019    Dysphonia    Encounter for debridement of left postmastoidectomy cavity 06/06/2023    Exposure to COVID-19 virus 06/06/2023    Fever of unknown origin 06/06/2023    GERD (gastroesophageal reflux disease) 06/20/2023    Heart failure with preserved left ventricular function (HFpEF) 06/06/2023    Hip pain, left 06/06/2023    Hyperlipidemia 06/20/2023    Impacted cerumen of left ear 06/06/2023    Lightheadedness 06/06/2023    Middle ear infection, chronic 06/06/2023    Osteoarthritis of knee, unspecified 05/16/2016    Osteoarthritis of knee    Other conditions influencing health status     Prostate Cancer    Other conditions influencing health status     Colonoscopy (Fiberoptic)    Other specified postprocedural states 11/03/2018    Status post cardiac catheterization    Personal history of other diseases of the circulatory system 02/15/2022    History of cardiomyopathy    Personal history of other diseases of the circulatory system     History of mitral valve disorder    Personal history of other diseases of the circulatory system     History of hypertension    Personal history of other diseases of the digestive system     History of esophageal reflux    Personal history of other endocrine, nutritional and metabolic disease     History of hyperlipidemia    Senile ectropion of left lower eyelid 03/07/2018    Formatting of this note  might be different from the original. Added automatically from request for surgery [id number]    Severe single current episode of major depressive disorder, with psychotic features (Multi) 06/06/2023    Spontaneous rupture of extensor tendons, left hand 12/15/2017    Nontraumatic rupture of sagittal band of extensor tendon of left upper extremity    Spontaneous rupture of extensor tendons, right hand 11/17/2017    Nontraumatic rupture of sagittal band of extensor tendon of right upper extremity    Unspecified systolic (congestive) heart failure 09/26/2022    HFrEF (heart failure with reduced ejection fraction)     Surgical History:  Past Surgical History:   Procedure Laterality Date    ANKLE ARTHROSCOPY W/ OPEN REPAIR  10/23/2013    Ankle Repair    APPENDECTOMY  10/23/2013    Appendectomy    CATARACT EXTRACTION  10/23/2013    Cataract Surgery    OTHER SURGICAL HISTORY  10/23/2013    Mastoidectomy    TOTAL KNEE ARTHROPLASTY  02/04/2014    Total Knee Arthroplasty     Family History:  Family History   Problem Relation Name Age of Onset    Alcohol abuse Mother      No Known Problems Father      Heart disease Brother          Age 53    Other (Age 95) Brother      No Known Problems Daughter      No Known Problems Son      No Known Problems Son      Other (Drug overdose) Son       Social History:  Social History     Socioeconomic History    Marital status:      Spouse name: Not on file    Number of children: Not on file    Years of education: Not on file    Highest education level: Not on file   Occupational History    Not on file   Tobacco Use    Smoking status: Never     Passive exposure: Never    Smokeless tobacco: Never   Substance and Sexual Activity    Alcohol use: Yes     Comment: rare    Drug use: Never    Sexual activity: Not on file   Other Topics Concern    Not on file   Social History Narrative    Not on file     Social Drivers of Health     Financial Resource Strain: Not on file   Food Insecurity: Not on  "file   Transportation Needs: Not on file   Physical Activity: Not on file   Stress: Not on file   Social Connections: Not on file   Intimate Partner Violence: Not on file   Housing Stability: Not on file       Medical Review Of Systems:  Pertinent items are noted in HPI.    Psychiatric Review Of Systems:  Psychiatric ROS - Adult  Anxiety: Negative  Depression: sad at times due to loss of wife  Delirium: negative  Psychosis: negative  Jenn: negative  Safety Issues: none    Disordered Eating Symptoms:n/a  Inattentive Symptoms:n/a   Hyperactive/Impulsive Symptoms:n/a    Trauma Symptoms:n/a       There were no vitals taken for this visit.     LABS  Lab Results   Component Value Date    TSH 3.92 01/15/2024    25 60.2 04/08/2022    [id number] 417 10/25/2022    HDL 43.0 01/15/2024      Lab Results   Component Value Date    CHOL 137 01/15/2024    CHOL 145 06/12/2023    CHOL 145 06/09/2022     Lab Results   Component Value Date    HDL 43.0 01/15/2024    HDL 46.4 06/12/2023    HDL 45.6 06/09/2022     Lab Results   Component Value Date    LDLCALC 58 01/15/2024     Lab Results   Component Value Date    TRIG 181 (H) 01/15/2024    TRIG 138 06/12/2023    TRIG 129 06/09/2022       Chemistry    Lab Results   Component Value Date/Time     01/06/2025 1538    K 4.8 01/06/2025 1538    CL 96 (L) 01/06/2025 1538    CO2 30 01/06/2025 1538    BUN 26 (H) 01/06/2025 1538    CREATININE 1.46 (H) 01/06/2025 1538    Lab Results   Component Value Date/Time    CALCIUM 9.2 01/06/2025 1538    ALKPHOS 66 01/06/2025 1538    AST 20 01/06/2025 1538    ALT 18 01/06/2025 1538    BILITOT 0.3 01/06/2025 1538          No components found for: \"CHOLHDL\"         10/7/2024     1:23 PM 11/6/2024    11:57 AM 12/2/2024     2:54 PM 12/16/2024    11:39 AM 12/16/2024    11:53 AM 2/5/2025    11:23 AM 2/5/2025    11:59 AM   Vitals   Systolic 128  146 157 122 159 126   Diastolic 74  76 83 72 76 74   BP Location    Right arm Right arm  Left arm   Heart Rate 64  66 " "62 60 60    Temp 36.2 °C (97.2 °F)  36.4 °C (97.6 °F)       Resp    20      Height 1.727 m (5' 8\") 1.727 m (5' 8\") 1.727 m (5' 8\") 1.702 m (5' 7\")      Weight (lb) 197 197 199.4 200  196    BMI 29.95 kg/m2 29.95 kg/m2 30.32 kg/m2 31.32 kg/m2  30.7 kg/m2    BSA (m2) 2.07 m2 2.07 m2 2.08 m2 2.07 m2  2.05 m2    Visit Report Report Report Report Report Report Report Report       Mental Status Examination  General Appearance: Well groomed, appropriate eye contact.  Gait/Station: Within normal limits  Speech: uses cane for stability   Mood: better mood   Affect: Euthymic, full-range  Thought Process: Linear, goal directed  Thought Associations: No loosening of associations  Thought Content: normal  Perception: No perceptual abnormalities noted  Level of Consciousness: Alert  Orientation: Alert and oriented to person, place, time and situation  Attention and Concentration: Intact  Recent Memory: Intact as evidenced by ability to recall details from the past 24 hours   Remote Memory: Intact as evidenced by ability to recall previous medical issues   Executive function: Intact  Language: Naming intact  Fund of knowledge: Good  Insight: intact  Judgment: intact         Assessment/Plan   This 89 y/o male has a h/o of one depressive episode (jan 2017) in the setting of low Na and stress. He had not previous mood struggles. There is sadness related to bereavement.   He is now taking remeron, trazadone, melatonin. Middle insomnia is present to various degree. No hypnopompic hallucinations.  There are no acute safety issues such as SI/HI.       dx: depression, unspecified,  ; insomnia ; bereavement, uncomplicated      1) continue mirtazapine  15  mg at night and evaluate for mood improvement, trazadone 50 mg at night as needed , melatonin used as needed,  taking balance of nature vitamins  2) follow up  8 weeks or earlier if needed   3) labs reviewed, worsening renal function - lasix cut down   4) safety plan reviewed - if there " are thoughts of self harm, concerning side effects or significant symptoms worsening report to the ER, call [phone]       Review with patient: Treatment plan reviewed with the patient.  Medication risks/benefit reviewed with the patient  Psychiatric Risk Assessment  Violence Risk Assessment: male  Acute Risk of Harm to Others is Considered: low   Suicide Risk Assessment: age > 65 yrs old  Protective Factors against Suicide: adherence to  treatment, hopefulness/future orientation, moral objections to suicide, positive family relationships, Anab[employer]ism affiliation/spirituality, sense of responsibility toward family, social support/connectedness, and strong coping skills  Acute Risk of Harm to Self is Considered: low        Bee Hoyt, DO

## 2025-03-03 NOTE — PATIENT INSTRUCTIONS
1) continue  remeron 15  mg in the evenings,  trazadone  50 mg at night as needed for sleep  2) I will see you in about 2  months    -If there are any thoughts of harming your self, harming others, concerning worsening of your mental health symptoms or concerning medication side effects, please call [phone] or reports to the nearest emergency room.

## 2025-03-06 ENCOUNTER — TELEPHONE (OUTPATIENT)
Dept: CARDIOLOGY | Facility: HOSPITAL | Age: OVER 89
End: 2025-03-06

## 2025-03-06 DIAGNOSIS — R06.09 DYSPNEA ON EXERTION: ICD-10-CM

## 2025-03-07 RX ORDER — METOPROLOL SUCCINATE 100 MG/1
100 TABLET, EXTENDED RELEASE ORAL 2 TIMES DAILY
Qty: 180 TABLET | Refills: 3 | Status: SHIPPED | OUTPATIENT
Start: 2025-03-07

## 2025-03-14 VITALS
RESPIRATION RATE: 16 BRPM | TEMPERATURE: 97.6 F | DIASTOLIC BLOOD PRESSURE: 74 MMHG | SYSTOLIC BLOOD PRESSURE: 130 MMHG | HEART RATE: 64 BPM

## 2025-03-14 ASSESSMENT — ENCOUNTER SYMPTOMS
SHORTNESS OF BREATH: 0
VOICE CHANGE: 0
COUGH: 0
SINUS PRESSURE: 0
ABDOMINAL PAIN: 0
TROUBLE SWALLOWING: 0
RESPIRATORY NEGATIVE: 1
CARDIOVASCULAR NEGATIVE: 1
SORE THROAT: 0
FEVER: 0
DIARRHEA: 1
WHEEZING: 0
RHINORRHEA: 1

## 2025-03-27 ASSESSMENT — ENCOUNTER SYMPTOMS
NAUSEA: 0
DIARRHEA: 0
LIGHT-HEADEDNESS: 0
VOMITING: 0
WEAKNESS: 0
RESPIRATORY NEGATIVE: 1
ABDOMINAL PAIN: 0
CONSTITUTIONAL NEGATIVE: 1
CARDIOVASCULAR NEGATIVE: 1
DIZZINESS: 0
HEADACHES: 0
SORE THROAT: 0
TROUBLE SWALLOWING: 0
FEVER: 0
VOICE CHANGE: 0
CONSTIPATION: 0

## 2025-03-27 NOTE — PROGRESS NOTES
"Subjective   Patient ID: Jf Watts \"ZORAN" is a 89 y.o. male who presents for Follow-up and Results.  HPI  No acute complaints  Move to [facility], comfortable    Review of Systems   Constitutional: Negative.  Negative for fever.   HENT:  Negative for sore throat, trouble swallowing and voice change.    Respiratory: Negative.     Cardiovascular: Negative.    Gastrointestinal:  Negative for abdominal pain, constipation, diarrhea, nausea and vomiting.   Neurological:  Negative for dizziness, syncope, weakness, light-headedness and headaches.       Objective   Physical Exam  Constitutional:       General: He is not in acute distress.     Appearance: Normal appearance.   HENT:      Nose: No congestion.      Mouth/Throat:      Pharynx: Oropharynx is clear. No oropharyngeal exudate.   Neck:      Comments: No JVD or thyromegaly  Cardiovascular:      Rate and Rhythm: Normal rate and regular rhythm.      Pulses: Normal pulses.      Heart sounds: Normal heart sounds.   Pulmonary:      Effort: Pulmonary effort is normal.      Breath sounds: Normal breath sounds.   Abdominal:      General: Abdomen is flat. Bowel sounds are normal.      Palpations: Abdomen is soft. There is no mass.      Tenderness: There is no abdominal tenderness.   Musculoskeletal:         General: No tenderness.      Right lower leg: Edema present.      Left lower leg: Edema present.      Comments: Compression stockings, nontender   Lymphadenopathy:      Cervical: No cervical adenopathy.   Neurological:      General: No focal deficit present.      Mental Status: He is alert.      Gait: Gait abnormal.      Comments: Ambulatory with cane   Psychiatric:         Attention and Perception: Attention normal.         Mood and Affect: Mood normal. Mood is not anxious or depressed. Affect is not blunt, angry or tearful.         Speech: Speech normal.         Behavior: Behavior is slowed. Behavior is not agitated. Behavior is cooperative.         " Cognition and Memory: Cognition normal.      Comments: Comfortable and relaxed  Comfortable with the decision to move       /74 (BP Location: Left arm, Patient Position: Sitting, BP Cuff Size: Adult)   Pulse 60   Wt 88.9 kg (196 lb)   SpO2 95%   BMI 30.70 kg/m²     Data  Lab 1/6/2025 H/H 10/32%, CMP glucose 115, creatinine 1.5/GFR 43,   PSA 1/15/2024 3.56  Ultrasound abdomen 1/10/2025-coarse liver, renal cyst  Reviewed consultations:  Cardiology 12/16/2024  ENT 11/6  Behavioral health 12/2  Ophthalmology 10/21, 1/21/2025    Assessment/Plan     Overall you are doing well today  Blood tests look favorable with improved kidney function  Anemia stable  Continue omeprazole indefinitely to protect your stomach  Ultrasound of the liver, gallbladder and pancreas was satisfactory  Kidneys with chronic stable changes    Problem List Items Addressed This Visit       Adenocarcinoma of prostate (Multi)    Stage 3b chronic kidney disease (Multi)    CHF (congestive heart failure)    Severe episode of recurrent major depressive disorder, without psychotic features (Multi)

## 2025-04-22 ENCOUNTER — APPOINTMENT (OUTPATIENT)
Dept: CARDIOLOGY | Facility: CLINIC | Age: OVER 89
End: 2025-04-22
Payer: MEDICARE

## 2025-04-22 VITALS
SYSTOLIC BLOOD PRESSURE: 98 MMHG | HEART RATE: 65 BPM | HEIGHT: 68 IN | DIASTOLIC BLOOD PRESSURE: 51 MMHG | OXYGEN SATURATION: 90 % | BODY MASS INDEX: 30.52 KG/M2 | WEIGHT: 201.38 LBS

## 2025-04-22 DIAGNOSIS — R06.09 DYSPNEA ON EXERTION: ICD-10-CM

## 2025-04-22 DIAGNOSIS — I10 HYPERTENSION, UNSPECIFIED TYPE: ICD-10-CM

## 2025-04-22 DIAGNOSIS — I27.20 PULMONARY HYPERTENSION (MULTI): ICD-10-CM

## 2025-04-22 DIAGNOSIS — I25.10 MILD CAD: Primary | ICD-10-CM

## 2025-04-22 DIAGNOSIS — R14.0 DISTENDED ABDOMEN: ICD-10-CM

## 2025-04-22 DIAGNOSIS — I50.30 HEART FAILURE WITH PRESERVED EJECTION FRACTION, UNSPECIFIED HF CHRONICITY: ICD-10-CM

## 2025-04-22 DIAGNOSIS — I50.9 CONGESTIVE HEART FAILURE, UNSPECIFIED HF CHRONICITY, UNSPECIFIED HEART FAILURE TYPE: ICD-10-CM

## 2025-04-22 PROCEDURE — 1159F MED LIST DOCD IN RCRD: CPT | Performed by: INTERNAL MEDICINE

## 2025-04-22 PROCEDURE — 99212 OFFICE O/P EST SF 10 MIN: CPT

## 2025-04-22 PROCEDURE — 3078F DIAST BP <80 MM HG: CPT | Performed by: INTERNAL MEDICINE

## 2025-04-22 PROCEDURE — 1036F TOBACCO NON-USER: CPT | Performed by: INTERNAL MEDICINE

## 2025-04-22 PROCEDURE — 1126F AMNT PAIN NOTED NONE PRSNT: CPT | Performed by: INTERNAL MEDICINE

## 2025-04-22 PROCEDURE — 99214 OFFICE O/P EST MOD 30 MIN: CPT | Performed by: INTERNAL MEDICINE

## 2025-04-22 PROCEDURE — 3074F SYST BP LT 130 MM HG: CPT | Performed by: INTERNAL MEDICINE

## 2025-04-22 RX ORDER — METOPROLOL SUCCINATE 100 MG/1
100 TABLET, EXTENDED RELEASE ORAL DAILY
Qty: 90 TABLET | Refills: 3 | Status: SHIPPED | OUTPATIENT
Start: 2025-04-22 | End: 2026-04-22

## 2025-04-22 RX ORDER — FUROSEMIDE 20 MG/1
100 TABLET ORAL DAILY
Qty: 360 TABLET | Refills: 1 | Status: SHIPPED | OUTPATIENT
Start: 2025-04-22 | End: 2026-04-22

## 2025-04-22 ASSESSMENT — ENCOUNTER SYMPTOMS
DEPRESSION: 0
COUGH: 0
OCCASIONAL FEELINGS OF UNSTEADINESS: 1
CONSTIPATION: 0
MEMORY LOSS: 0
VOMITING: 0
DYSURIA: 0
FEVER: 0
BLOATING: 0
CHILLS: 0
HEADACHES: 0
HEMATURIA: 0
HEMOPTYSIS: 0
LOSS OF SENSATION IN FEET: 1
NAUSEA: 0
ABDOMINAL PAIN: 0
MYALGIAS: 0
ALTERED MENTAL STATUS: 0
DIARRHEA: 0
FALLS: 0

## 2025-04-22 ASSESSMENT — COLUMBIA-SUICIDE SEVERITY RATING SCALE - C-SSRS
1. IN THE PAST MONTH, HAVE YOU WISHED YOU WERE DEAD OR WISHED YOU COULD GO TO SLEEP AND NOT WAKE UP?: NO
6. HAVE YOU EVER DONE ANYTHING, STARTED TO DO ANYTHING, OR PREPARED TO DO ANYTHING TO END YOUR LIFE?: NO
2. HAVE YOU ACTUALLY HAD ANY THOUGHTS OF KILLING YOURSELF?: NO

## 2025-04-22 ASSESSMENT — PATIENT HEALTH QUESTIONNAIRE - PHQ9
2. FEELING DOWN, DEPRESSED OR HOPELESS: NOT AT ALL
1. LITTLE INTEREST OR PLEASURE IN DOING THINGS: NOT AT ALL
SUM OF ALL RESPONSES TO PHQ9 QUESTIONS 1 AND 2: 0

## 2025-04-22 ASSESSMENT — PAIN SCALES - GENERAL: PAINLEVEL_OUTOF10: 0-NO PAIN

## 2025-04-22 NOTE — PROGRESS NOTES
Chief Complaint   Patient presents with    Follow-up     CHF    Coronary Artery Disease    Hypertension    Hyperlipidemia        HPI  89 yo WM w/ h/o mild CM, HFrEF -> HFpEF, pulm HTN, mild CAD, HTN, HPL, CKD, FHx CAD now here for cardiology f/u. No further LH/dizzy on reduced meds (Imdur stopped).  No chest pain. No sig dyspnea at rest. +ch HANKINS (worst after bending over), prev no sig improvement on increased diuretics. +occ mild orthopnea/wheeze (more when 1st getting into bed and resolves after seconds). No PND. No palps. No further LH/dizziness. No def syncope (he had an episode last week where he sat on couch and woke up later). +ch dependent LE edema. No claudication. +occ cough (no change on allergy med or PPI). +occ fatigue. +occ snoring (when on back). +occ B arm weakness x 30-60 seconds. +ch foot numb. +easy bleeding. Wgt remains up.  BP at home (occ checked): usually <100/60 (occ up to 130/70), HR 60s (rare 90s)  Wgt at home: ~195 lbs (base ~185 lbs)  ECG 6/17: SR (70), 1st deg AVB, LAFB  ECG 12/17: SR (81), LAFB, LVH w/ ST changes  ECG 12/18: SR (64), 1st deg AVB, LAFB, LVH  ECG 1/20: SB (58), 1st deg AVB, LBBB  ECG 6/22: SB (57), IVCD  ECG 1/23: SR (64), IVCD, PVCs/bigeminy  ECG 8/24: SR (60), 1st deg AVB, LBBB  HM 9/24: SR, HR  (avg 62), VT [suspect SVT w/ aber] (long 9b)  Echo 11/17: EF 35-40%, ecc LVH, DD, LAE, mild MR, mild AI  Echo 4/21: EF 50-55%, DD, mod LAE, valves ok  CCS 12/17: 744 (LM 0, , LCx 0, ), small R pleural effusion, sm-mod HH  Cath 5/18: pLAD 30%, pRCA 10-30%, LVEDP 22-27, PCW 22, PA 60/26/41, CO 5.3, CI 2.6, no shunt, no AS  CXR 1/17: no acute abnl  CXR 3/19: HH, subseg atelectasis at LL base, otherwise lungs clear  CXR 5/24: tr L pl eff + atelectasis  CT brain 6/17: no acute abnl     Review of Systems   Constitutional: Negative for chills, fever and malaise/fatigue.   HENT:  Negative for hearing loss.    Eyes:  Negative for visual disturbance.   Respiratory:   Negative for cough and hemoptysis.    Skin:  Negative for rash.   Musculoskeletal:  Negative for falls and myalgias.   Gastrointestinal:  Negative for bloating, abdominal pain, constipation, diarrhea, dysphagia, nausea and vomiting.   Genitourinary:  Negative for dysuria and hematuria.   Neurological:  Negative for headaches.   Psychiatric/Behavioral:  Negative for altered mental status, depression and memory loss.       Social History     Tobacco Use    Smoking status: Never     Passive exposure: Never    Smokeless tobacco: Never   Substance Use Topics    Alcohol use: Yes     Comment: rare      Family History   Problem Relation Name Age of Onset    Alcohol abuse Mother      No Known Problems Father      Heart disease Brother          Age 53    Other (Age 95) Brother      No Known Problems Daughter      No Known Problems Son      No Known Problems Son      Other (Drug overdose) Son        Allergies   Allergen Reactions    Penicillins Hives and Swelling    Sulfa (Sulfonamide Antibiotics) Hives and Swelling    Wellbutrin [Bupropion Hcl] Hives      Current Outpatient Medications   Medication Instructions    aspirin 81 mg EC tablet 1 tablet, Every other day    furosemide (LASIX) 80 mg, oral, Daily    losartan (COZAAR) 25 mg, oral, Daily    melatonin 10 mg capsule 1 capsule, Nightly    metoclopramide (REGLAN) 10 mg, oral, 4 times daily PRN    metoprolol succinate XL (TOPROL-XL) 100 mg, oral, 2 times daily    mirtazapine (REMERON) 22.5 mg, oral, Nightly    multivit-iron-minerals-folic acid (Centrum Silver) 0.4 mg-300 mcg- 250 mcg tab 1 tablet, Daily    nepafenac (Nevanac) 0.1 % ophthalmic suspension 1 drop, 3 times daily    omeprazole (PRILOSEC) 40 mg, oral, Daily before breakfast, Do not crush or chew.    simvastatin (ZOCOR) 20 mg, oral, Nightly    spironolactone (ALDACTONE) 25 mg, oral, Daily    timolol (Timoptic) 0.5 % ophthalmic solution Every 12 hours    traZODone (DESYREL) 50 mg, oral, Nightly      BP 98/51   Pulse  "65   Ht 1.727 m (5' 8\")   Wt 91.3 kg (201 lb 6 oz)   SpO2 90%   BMI 30.62 kg/m²       Physical Exam  Constitutional:       Appearance: Normal appearance.   HENT:      Head: Normocephalic and atraumatic.      Nose: Nose normal.   Cardiovascular:      Rate and Rhythm: Normal rate and regular rhythm.      Heart sounds: No murmur heard.  Pulmonary:      Effort: Pulmonary effort is normal.      Breath sounds: Examination of the right-lower field reveals rales. Examination of the left-lower field reveals rales. Rales present.   Abdominal:      General: There is distension.      Palpations: Abdomen is soft.      Tenderness: There is no abdominal tenderness.   Musculoskeletal:      Right lower le+ Edema present.      Left lower le+ Edema present.   Skin:     General: Skin is warm and dry.   Neurological:      General: No focal deficit present.      Mental Status: He is alert.   Psychiatric:         Mood and Affect: Mood normal.         Judgment: Judgment normal.        Results/Data   Cr 1.46, K 4.8, LFT nl, HGB 10.3, ,  (Lasix 80)   Cr 1.94, K 4.8, Mg 2.31, BNP 1.94   Cr 2.41, K 5.2 (Lasix 100)   Cr 2.14, K 4.9   Cr 2.22, K 4.9, LDL 58, HGB 11.8, , TSH 3.92   Cr 1.53, K 4.8, ALT/AST nl, LDL 71, HDL 46, , Chol 145, HGB 12.5, , TSH 3.73   Cr 1.72, K 4.9, LFT nl, HGB 11.1,   10/22 Cr 1.35, K 4.3, LFT nl   Cr 1.62, K 4.8, Na 138, ALT/AST nl, LDL 74, HDL 46, , Chol 145, HGB 13.3, , TSH 3.48   Cr 1.56, K 4.6   Cr 2.02, K 5.2, LFT nl, HGB 12.9,   10/21 Cr 1.83, K 5.1   Cr 1.86, K 5.1, HGB 12.5,    Cr 2.33, K 4.8   Cr 3.02, K 4.3, Mg 2.24, BNP 39 (Lasix, Sundeep, Metolazone)   Cr 2.38, K 4.6, BUN 52 (Lasix 80, Sprio 25)   Cr 2.02, K 5.1  3/21 HGB 13.4,    Cr 1.78, K 4.3, HGB 12.1, , BNP 96   Cr 1.4, K 4.7, HGB 11.9, ,    Cr 1.84, K 4.5   Cr 1.3, K " 4.9, ALT/AST nl, LDL 77, HDL 48, TG 75, Chol 140, HGB 11.1, , TSH 5.2  10/19 Cr 1.7, K 4.6, ALT/AST nl, LDL 83, HDL 46, , Chol 157, HGB 13, , TSH 4.5, FT4 1.2  5/19 Cr 1.4, K 4.3  3/19 Cr 1.7, K 4.5, BNP 70  2/19 Cr 1.91, K 4.8, BNP 99  11/18 Cr 1.6, K 4.6, LFT nl, HGB 12.5, , HDL 48, Chol 182  5/18 Cr 1.49, K 4.4  5/18 Cr 1.4, K 4.8, LFT nl, HGB 13.5, , BNP 59  4/18 Cr 1.3, K 5.2, HGB 12.8,   12/17 Cr 1.13, K 4.3,   12/17 Cr 0.9, K 3.8, HGB 13.4, , TSH 2.7  6/17 ALT/AST nl, LDL 91, HDL 59, , Chol 170  1/17 TPN neg     Assessment/Plan   89 yo WM w/ h/o mild CM, HFrEF -> HFpEF, pulm HTN, mild CAD, HTN, HPL, CKD, FHx CAD.   He has chronic decompensated CHF (HANKINS/edema). Problem is kidney gets irritated with more diuretics. May have to accept a balance between CHF and CKD.   CM essentially resolved on BB/ARB - last EF low normal at 50-55%.  L/R press elevated. Pulm HTN most likely sec to CHF. Consider PFTs (earlene if more dyspnea/wheezing) or can just try MDI (he prefers to hold off for now).   Okay to remain conservative in this elderly patient - try to minimize symptoms.  -continue ASA 81 qMWF (easy bleeding on qd)  -okay to try reduce Metoprolol Succinate from 100 bid to qd (low BP) -> may need to eventually repeat an echo to make sure EF still low normal - especially if any worsened symptoms.  Check CT ab to eval for ascites. Check labs today. He is seeing nephrology next week.  -continue Losartan 25 qd  -continue Spironolactone 25 qd  -increase Furosemide from 80 to 100 every day (he claims Bumex did not work for him; kidney prev irritated on Metolazone)  -continue Simva 20 qhs  -f/u 4 months (earlier if needed)     Mike Lao MD

## 2025-04-22 NOTE — PATIENT INSTRUCTIONS
Decrease Metoprolol from 100mg 2x/day to 1x/day    Increase Furosemide from 4 to 5 tabs/day (100mg total)

## 2025-04-23 LAB
ALBUMIN SERPL-MCNC: 4.1 G/DL (ref 3.6–5.1)
BNP SERPL-MCNC: 168 PG/ML
BUN SERPL-MCNC: 41 MG/DL (ref 7–25)
BUN/CREAT SERPL: 19 (CALC) (ref 6–22)
CALCIUM SERPL-MCNC: 9.3 MG/DL (ref 8.6–10.3)
CHLORIDE SERPL-SCNC: 98 MMOL/L (ref 98–110)
CO2 SERPL-SCNC: 30 MMOL/L (ref 20–32)
CREAT SERPL-MCNC: 2.2 MG/DL (ref 0.7–1.22)
EGFRCR SERPLBLD CKD-EPI 2021: 26 ML/MIN/1.73M2
ERYTHROCYTE [DISTWIDTH] IN BLOOD BY AUTOMATED COUNT: 14.3 % (ref 11–15)
GLUCOSE SERPL-MCNC: 108 MG/DL (ref 65–139)
HCT VFR BLD AUTO: 32.8 % (ref 38.5–50)
HGB BLD-MCNC: 10.5 G/DL (ref 13.2–17.1)
MCH RBC QN AUTO: 28.6 PG (ref 27–33)
MCHC RBC AUTO-ENTMCNC: 32 G/DL (ref 32–36)
MCV RBC AUTO: 89.4 FL (ref 80–100)
PHOSPHATE SERPL-MCNC: 3.5 MG/DL (ref 2.1–4.3)
PLATELET # BLD AUTO: 231 THOUSAND/UL (ref 140–400)
PMV BLD REES-ECKER: 10.5 FL (ref 7.5–12.5)
POTASSIUM SERPL-SCNC: 5 MMOL/L (ref 3.5–5.3)
RBC # BLD AUTO: 3.67 MILLION/UL (ref 4.2–5.8)
SODIUM SERPL-SCNC: 139 MMOL/L (ref 135–146)
WBC # BLD AUTO: 7.7 THOUSAND/UL (ref 3.8–10.8)

## 2025-04-28 ENCOUNTER — APPOINTMENT (OUTPATIENT)
Dept: NEPHROLOGY | Facility: CLINIC | Age: OVER 89
End: 2025-04-28
Payer: MEDICARE

## 2025-04-28 VITALS
DIASTOLIC BLOOD PRESSURE: 64 MMHG | OXYGEN SATURATION: 85 % | TEMPERATURE: 97.8 F | HEART RATE: 63 BPM | SYSTOLIC BLOOD PRESSURE: 124 MMHG

## 2025-04-28 DIAGNOSIS — N18.30 STAGE 3 CHRONIC KIDNEY DISEASE, UNSPECIFIED WHETHER STAGE 3A OR 3B CKD (MULTI): Primary | ICD-10-CM

## 2025-04-28 PROCEDURE — 3074F SYST BP LT 130 MM HG: CPT | Performed by: INTERNAL MEDICINE

## 2025-04-28 PROCEDURE — 99214 OFFICE O/P EST MOD 30 MIN: CPT | Performed by: INTERNAL MEDICINE

## 2025-04-28 PROCEDURE — 3078F DIAST BP <80 MM HG: CPT | Performed by: INTERNAL MEDICINE

## 2025-04-28 NOTE — PATIENT INSTRUCTIONS
Once your weight stabilizes, you can take 100 mg Furosemide daily alternating with 80 mg daily  See me in 4 mo

## 2025-04-28 NOTE — PROGRESS NOTES
"For follow up, doing well.  No complaints  Had gained weight and had worsened swelling, dos eof Lasix increased to 100 mg daily by his cardiologist, since then weight has been decreasing  C/o Sob on bending, believes he has fluid in his abdomen  Now living in independent living, makes his own lunch consisting of cheese, crackers, ham and yogurt, has dinner in evening consisting of meat and vegs.  Denies orthostatic symptoms    RoS negative for all other systems except as noted above.        12/16/2024    11:53 AM 1/6/2025     2:45 PM 2/5/2025    11:23 AM 2/5/2025    11:59 AM 4/22/2025     1:41 PM 4/22/2025     2:09 PM 4/28/2025     3:21 PM   Vitals   Systolic 122 130 159 126 93 98 124   Diastolic 72 74 76 74 56 51 64   BP Location Right arm   Left arm Right arm  Left arm   Heart Rate 60 64 60  65  63   Temp  36.4 °C (97.6 °F)     36.6 °C (97.8 °F)   Resp  16        Height     1.727 m (5' 8\")     Weight (lb)   196  201.38     BMI   30.7 kg/m2  30.62 kg/m2     BSA (m2)   2.05 m2  2.09 m2     Visit Report Report Report Report Report Report Report Report     No distress  HEENT:  moist, no pallor  2+ edema olvin LE, wearing compression stockings  Breath sounds olvin equal, clear  S1 S2 regular, normal, no rub or murmur  Abdomen soft  AAO x3, non focal    Lab Results   Component Value Date     04/22/2025     01/06/2025     12/16/2024    K 5.0 04/22/2025    K 4.8 01/06/2025    K 4.8 12/16/2024    CL 98 04/22/2025    CL 96 (L) 01/06/2025    CL 99 12/16/2024    CO2 30 04/22/2025    CO2 30 01/06/2025    CO2 31 12/16/2024    BUN 41 (H) 04/22/2025    BUN 26 (H) 01/06/2025    BUN 34 (H) 12/16/2024    CREATININE 2.20 (H) 04/22/2025    CREATININE 1.46 (H) 01/06/2025    CREATININE 1.94 (H) 12/16/2024    CALCIUM 9.3 04/22/2025    CALCIUM 9.2 01/06/2025    CALCIUM 9.4 12/16/2024    PHOS 3.5 04/22/2025    PHOS 3.3 01/06/2025    PHOS 4.0 12/16/2024    VITD25 46 01/15/2024    VITD25 44 06/09/2022    MICROALBCREA  " 08/19/2024      Comment:      One or more analytes used in this calculation is outside of the analytical measurement range. Calculation cannot be performed.    MICROALBCREA  01/15/2024      Comment:      One or more analytes used in this calculation is outside of the analytical measurement range. Calculation cannot be performed.    HGB 10.5 (L) 04/22/2025    HGB 10.3 (L) 01/06/2025    HGB 11.4 (L) 12/16/2024      Current Outpatient Medications   Medication Instructions    aspirin 81 mg EC tablet 1 tablet, Every other day    furosemide (LASIX) 100 mg, oral, Daily    losartan (COZAAR) 25 mg, oral, Daily    melatonin 10 mg capsule 1 capsule, Nightly    metoclopramide (REGLAN) 10 mg, oral, 4 times daily PRN    metoprolol succinate XL (TOPROL-XL) 100 mg, oral, Daily    mirtazapine (REMERON) 22.5 mg, oral, Nightly    multivit-iron-minerals-folic acid (Centrum Silver) 0.4 mg-300 mcg- 250 mcg tab 1 tablet, Daily    nepafenac (Nevanac) 0.1 % ophthalmic suspension 1 drop, 3 times daily    omeprazole (PRILOSEC) 40 mg, oral, Daily before breakfast, Do not crush or chew.    simvastatin (ZOCOR) 20 mg, oral, Nightly    spironolactone (ALDACTONE) 25 mg, oral, Daily    timolol (Timoptic) 0.5 % ophthalmic solution Every 12 hours    traZODone (DESYREL) 50 mg, oral, Nightly     89-year-old male with medical history of CKD 3b (baseline serum creatinine 1.4 - 2.4 mg/dL), HTN, mild CM, HFrEF -> HFpEF (EF 50-55%, 4/7/21), pulm HTN, mild CAD, and HLD for follow up.     # CKD3b: Creatinine 2.2 mg/dl, eGFR 26 ml/min on 4/22/25, worsened from diuretics and cardiorenal physiology. Ct Lasix  100 mg/day till weight stabilizes, then try alsix 100 mg Po daily alternating with 80 mg Po daily.Ct leg elevation, compression sock use.  He knows to avoid NSAIDs. Discussed attempting to reduce sodium intake in diet as this is leading to fluid overload, difficult since he toney snot have access to fruits and vegs.     # HTN: On losartan 25 mg once a day,  metoprolol succinate 100 mg  a day, spironolactone 25 mg once a day, furosemide as above. Blood pressure acceptable.  2.5 g/day sodium restricted diet discussed again     # LE edema, weight gain: as above      RTC: In 4 months

## 2025-05-05 ENCOUNTER — APPOINTMENT (OUTPATIENT)
Dept: BEHAVIORAL HEALTH | Facility: CLINIC | Age: OVER 89
End: 2025-05-05
Payer: MEDICARE

## 2025-05-05 VITALS
WEIGHT: 201 LBS | BODY MASS INDEX: 30.46 KG/M2 | DIASTOLIC BLOOD PRESSURE: 84 MMHG | TEMPERATURE: 97.7 F | HEIGHT: 68 IN | SYSTOLIC BLOOD PRESSURE: 163 MMHG | HEART RATE: 65 BPM

## 2025-05-05 DIAGNOSIS — F43.21 ADJUSTMENT DISORDER WITH DEPRESSED MOOD: ICD-10-CM

## 2025-05-05 DIAGNOSIS — F41.9 ANXIETY DISORDER, UNSPECIFIED TYPE: ICD-10-CM

## 2025-05-05 DIAGNOSIS — G47.00 INSOMNIA, UNSPECIFIED TYPE: ICD-10-CM

## 2025-05-05 PROCEDURE — 1036F TOBACCO NON-USER: CPT | Performed by: PSYCHIATRY & NEUROLOGY

## 2025-05-05 PROCEDURE — 1159F MED LIST DOCD IN RCRD: CPT | Performed by: PSYCHIATRY & NEUROLOGY

## 2025-05-05 PROCEDURE — 3077F SYST BP >= 140 MM HG: CPT | Performed by: PSYCHIATRY & NEUROLOGY

## 2025-05-05 PROCEDURE — 99214 OFFICE O/P EST MOD 30 MIN: CPT | Performed by: PSYCHIATRY & NEUROLOGY

## 2025-05-05 PROCEDURE — 3079F DIAST BP 80-89 MM HG: CPT | Performed by: PSYCHIATRY & NEUROLOGY

## 2025-05-05 PROCEDURE — 1160F RVW MEDS BY RX/DR IN RCRD: CPT | Performed by: PSYCHIATRY & NEUROLOGY

## 2025-05-05 RX ORDER — MIRTAZAPINE 15 MG/1
15 TABLET, FILM COATED ORAL NIGHTLY
Qty: 90 TABLET | Refills: 0 | Status: SHIPPED | OUTPATIENT
Start: 2025-05-05 | End: 2025-08-03

## 2025-05-05 NOTE — PATIENT INSTRUCTIONS
1) continue  remeron 15  mg in the evenings,  trazadone  50 mg at night as needed for sleep  2) I will see you in about 2  months    -If there are any thoughts of harming your self, harming others, concerning worsening of your mental health symptoms or concerning medication side effects, please call [phone] or reports to the nearest emergency room.

## 2025-05-05 NOTE — PROGRESS NOTES
"Outpatient Psychiatry        Reason for Visit: follow up for depression, unspecified, remission ; insomnia ; bereavement, uncomplicated . \"I am doing better, I have figured out how to handle my sleep\". Takes melatonin 10 mg nightly and falls asleep, when he awakens at 2 am he takes mirtazepine and trazadone.   -living at [facility], no falls       HPI: 89 y/o  male who presents for F/U regarding depression and insomnia. Jf estimates he is taking remeron every other night. We reviewed that for it to work as an antidepressant it should be taken daily. He does not believe he has a depressive disorder, but feels that he is appropriately down about his situation (aging, )   Sees children once per week.    Has motorized wheel chair. This helps a lot as there are long hallways where he resides. Able to do more      Exercising on his own .    Less daily sadness/grief.    He leased a new car which he is happy about it, feels more independent.   no death wish or SI.   There have been no hallucinations.    nocturia awakens him at night.   Uses a cane , gets tired when walking. Back hurts with standing/walking   no new health issues aside from recent GI flu. Notices arms fall asleep at times - will discuss with pcp.    No falls     past psych h/o: previously following with Angelia Cuenca NP. Began to see her after inpatient admission [facility] 2017 for depression and SI (associated with hyponatremia) . No other psych tx. He has been on celexa, seroquel, trazadone, stoppped cymbalta 30 mg 9/2018. Took ambien for 10 years until 2017.      chem dep: no substance abuse h/o, but has one drink per night (1.5 ounces of liquor or 8 oz of wine).      family psych h/o: mom- alcoholic, brother - alcohol abuse and his 2 kids drink heavily.      past med h/o: glaucoma, HTN, GERD, HLD, h/o prostate CA, cardiomyopathy, B/L TKA   all: sulfa, PCN, wellbutrin (hives)      social h/o: raised by mom and step-dad. Finished hs and " straight to college - [employer]. Worked in , retired .  X2 . Currently  43 years. He has 3 bio sons- one son  from substance abuse in . One step daughter. All living children are in the area and they have close relationships. He sees his grandchildren at family gatherings. He loves sports and curls. . He enjoys activities. He was in the  Olympics trial in track. Played football along with other sports.          Current Medications:    Current Outpatient Medications:     aspirin 81 mg EC tablet, Take 1 tablet (81 mg) by mouth every other day., Disp: , Rfl:     furosemide (Lasix) 20 mg tablet, Take 5 tablets (100 mg) by mouth once daily., Disp: 360 tablet, Rfl: 1    losartan (Cozaar) 25 mg tablet, Take 1 tablet (25 mg) by mouth once daily., Disp: 100 tablet, Rfl: 3    melatonin 10 mg capsule, Take 1 capsule (10 mg) by mouth once daily at bedtime., Disp: , Rfl:     metoclopramide (Reglan) 10 mg tablet, Take 1 tablet (10 mg) by mouth 4 times a day as needed (nausea)., Disp: 30 tablet, Rfl: 0    metoprolol succinate XL (Toprol-XL) 100 mg 24 hr tablet, Take 1 tablet (100 mg) by mouth once daily., Disp: 90 tablet, Rfl: 3    mirtazapine (Remeron) 15 mg tablet, Take 1.5 tablets (22.5 mg) by mouth once daily at bedtime., Disp: 135 tablet, Rfl: 0    multivit-iron-minerals-folic acid (Centrum Silver) 0.4 mg-300 mcg- 250 mcg tab, Take 1 tablet by mouth once daily., Disp: , Rfl:     nepafenac (Nevanac) 0.1 % ophthalmic suspension, Administer 1 drop into affected eye(s) 3 times a day., Disp: , Rfl:     omeprazole (PriLOSEC) 40 mg DR capsule, Take 1 capsule (40 mg) by mouth once daily in the morning. Take before meals. Do not crush or chew., Disp: 90 capsule, Rfl: 3    simvastatin (Zocor) 20 mg tablet, TAKE 1 TABLET BY MOUTH IN THE  EVENING, Disp: 80 tablet, Rfl: 3    spironolactone (Aldactone) 25 mg tablet, Take 1 tablet (25 mg) by mouth once daily., Disp: 90 tablet, Rfl: 3    timolol  (Timoptic) 0.5 % ophthalmic solution, Administer into affected eye(s) every 12 hours., Disp: , Rfl:     traZODone (Desyrel) 50 mg tablet, TAKE 1 TABLET BY MOUTH ONCE  DAILY AT BEDTIME, Disp: 90 tablet, Rfl: 0  Medical History:  Past Medical History:   Diagnosis Date    Acquired trigger finger 06/06/2023    Acute bronchitis 06/06/2023    Acute depression 06/06/2023    Anxiety in acute stress reaction 06/06/2023    Bacterial pneumonia 06/06/2023    Blepharitis 06/06/2023    Chronic otitis media of left ear 06/06/2023    Conjunctivitis 06/06/2023    Cough 06/06/2023    Disease due to severe acute respiratory syndrome coronavirus 2 (SARS-CoV-2) 06/06/2023    Dysphonia 05/15/2019    Dysphonia    Encounter for debridement of left postmastoidectomy cavity 06/06/2023    Exposure to COVID-19 virus 06/06/2023    Fever of unknown origin 06/06/2023    GERD (gastroesophageal reflux disease) 06/20/2023    Heart failure with preserved left ventricular function (HFpEF) 06/06/2023    Hip pain, left 06/06/2023    Hyperlipidemia 06/20/2023    Impacted cerumen of left ear 06/06/2023    Lightheadedness 06/06/2023    Middle ear infection, chronic 06/06/2023    Osteoarthritis of knee, unspecified 05/16/2016    Osteoarthritis of knee    Other conditions influencing health status     Prostate Cancer    Other conditions influencing health status     Colonoscopy (Fiberoptic)    Other specified postprocedural states 11/03/2018    Status post cardiac catheterization    Personal history of other diseases of the circulatory system 02/15/2022    History of cardiomyopathy    Personal history of other diseases of the circulatory system     History of mitral valve disorder    Personal history of other diseases of the circulatory system     History of hypertension    Personal history of other diseases of the digestive system     History of esophageal reflux    Personal history of other endocrine, nutritional and metabolic disease     History of  hyperlipidemia    Senile ectropion of left lower eyelid 03/07/2018    Formatting of this note might be different from the original. Added automatically from request for surgery [id number]    Severe single current episode of major depressive disorder, with psychotic features (Multi) 06/06/2023    Spontaneous rupture of extensor tendons, left hand 12/15/2017    Nontraumatic rupture of sagittal band of extensor tendon of left upper extremity    Spontaneous rupture of extensor tendons, right hand 11/17/2017    Nontraumatic rupture of sagittal band of extensor tendon of right upper extremity    Unspecified systolic (congestive) heart failure 09/26/2022    HFrEF (heart failure with reduced ejection fraction)     Surgical History:  Past Surgical History:   Procedure Laterality Date    ANKLE ARTHROSCOPY W/ OPEN REPAIR  10/23/2013    Ankle Repair    APPENDECTOMY  10/23/2013    Appendectomy    CATARACT EXTRACTION  10/23/2013    Cataract Surgery    OTHER SURGICAL HISTORY  10/23/2013    Mastoidectomy    TOTAL KNEE ARTHROPLASTY  02/04/2014    Total Knee Arthroplasty     Family History:  Family History   Problem Relation Name Age of Onset    Alcohol abuse Mother      No Known Problems Father      Heart disease Brother          Age 53    Other (Age 95) Brother      No Known Problems Daughter      No Known Problems Son      No Known Problems Son      Other (Drug overdose) Son       Social History:  Social History     Socioeconomic History    Marital status:      Spouse name: Not on file    Number of children: Not on file    Years of education: Not on file    Highest education level: Not on file   Occupational History    Not on file   Tobacco Use    Smoking status: Never     Passive exposure: Never    Smokeless tobacco: Never   Substance and Sexual Activity    Alcohol use: Yes     Comment: rare    Drug use: Never    Sexual activity: Not on file   Other Topics Concern    Not on file   Social History Narrative    Not on file  "    Social Drivers of Health     Financial Resource Strain: Not on file   Food Insecurity: Not on file   Transportation Needs: Not on file   Physical Activity: Not on file   Stress: Not on file   Social Connections: Not on file   Intimate Partner Violence: Not on file   Housing Stability: Not on file       Medical Review Of Systems:  Pertinent items are noted in HPI.    Psychiatric Review Of Systems:  Psychiatric ROS - Adult  Anxiety: Negative  Depression: sad at times due to loss of wife  Delirium: negative  Psychosis: negative  Jenn: negative  Safety Issues: none    Disordered Eating Symptoms:n/a  Inattentive Symptoms:n/a   Hyperactive/Impulsive Symptoms:n/a    Trauma Symptoms:n/a       /84 (BP Location: Right arm, Patient Position: Sitting, BP Cuff Size: Adult)   Pulse 65   Temp 36.5 °C (97.7 °F)   Ht 1.727 m (5' 8\")   Wt 91.2 kg (201 lb)   BMI 30.56 kg/m²      LABS  Lab Results   Component Value Date    TSH 3.92 01/15/2024    25 60.2 04/08/2022    KRJNNVKP10 417 10/25/2022    HDL 43.0 01/15/2024      Lab Results   Component Value Date    CHOL 137 01/15/2024    CHOL 145 06/12/2023    CHOL 145 06/09/2022     Lab Results   Component Value Date    HDL 43.0 01/15/2024    HDL 46.4 06/12/2023    HDL 45.6 06/09/2022     Lab Results   Component Value Date    LDLCALC 58 01/15/2024     Lab Results   Component Value Date    TRIG 181 (H) 01/15/2024    TRIG 138 06/12/2023    TRIG 129 06/09/2022       Chemistry    Lab Results   Component Value Date/Time     04/22/2025 1443    K 5.0 04/22/2025 1443    CL 98 04/22/2025 1443    CO2 30 04/22/2025 1443    BUN 41 (H) 04/22/2025 1443    CREATININE 2.20 (H) 04/22/2025 1443    Lab Results   Component Value Date/Time    CALCIUM 9.3 04/22/2025 1443    ALKPHOS 66 01/06/2025 1538    AST 20 01/06/2025 1538    ALT 18 01/06/2025 1538    BILITOT 0.3 01/06/2025 1538          No components found for: \"CHOLHDL\"         1/6/2025     2:45 PM 2/5/2025    11:23 AM 2/5/2025    11:59 " "AM 4/22/2025     1:41 PM 4/22/2025     2:09 PM 4/28/2025     3:21 PM 5/5/2025    11:47 AM   Vitals   Systolic 130 159 126 93 98 124 163   Diastolic 74 76 74 56 51 64 84   BP Location   Left arm Right arm  Left arm Right arm   Heart Rate 64 60  65  63 65   Temp 36.4 °C (97.6 °F)     36.6 °C (97.8 °F) 36.5 °C (97.7 °F)   Resp 16         Height    1.727 m (5' 8\")   1.727 m (5' 8\")   Weight (lb)  196  201.38   201   BMI  30.7 kg/m2  30.62 kg/m2   30.56 kg/m2   BSA (m2)  2.05 m2  2.09 m2   2.09 m2   Visit Report Report Report Report Report Report Report Report       Mental Status Examination  General Appearance: Well groomed, appropriate eye contact.  Gait/Station: Within normal limits  Speech: uses cane for stability   Mood: better mood   Affect: Euthymic, full-range  Thought Process: Linear, goal directed  Thought Associations: No loosening of associations  Thought Content: normal  Perception: No perceptual abnormalities noted  Level of Consciousness: Alert  Orientation: Alert and oriented to person, place, time and situation  Attention and Concentration: Intact  Recent Memory: Intact as evidenced by ability to recall details from the past 24 hours   Remote Memory: Intact as evidenced by ability to recall previous medical issues   Executive function: Intact  Language: Naming intact  Fund of knowledge: Good  Insight: intact  Judgment: intact         Assessment/Plan   This 89 y/o male has a h/o of one depressive episode (jan 2017) in the setting of low Na and stress. He had not previous mood struggles. There is sadness related to bereavement, not having wife with him.   He is now taking remeron, trazadone, melatonin. Middle insomnia is present to various degree. No hypnopompic hallucinations.  There are no acute safety issues such as SI/HI.       dx: depression, unspecified,  ; insomnia ; bereavement, uncomplicated      1) continue mirtazapine  15  mg at night and evaluate for mood improvement, trazadone 50 mg at night " as needed , melatonin used as needed,  taking balance of nature vitamins  2) follow up  8 weeks or earlier if needed   3) labs reviewed, worsening renal function - lasix cut down   4) safety plan reviewed - if there are thoughts of self harm, concerning side effects or significant symptoms worsening report to the ER, call [phone]       Review with patient: Treatment plan reviewed with the patient.  Medication risks/benefit reviewed with the patient  Psychiatric Risk Assessment  Violence Risk Assessment: male  Acute Risk of Harm to Others is Considered: low   Suicide Risk Assessment: age > 65 yrs old  Protective Factors against Suicide: adherence to  treatment, hopefulness/future orientation, moral objections to suicide, positive family relationships, Roman Catholic affiliation/spirituality, sense of responsibility toward family, social support/connectedness, and strong coping skills  Acute Risk of Harm to Self is Considered: low        Bee Hoyt, DO

## 2025-05-08 ENCOUNTER — HOSPITAL ENCOUNTER (OUTPATIENT)
Dept: RADIOLOGY | Facility: CLINIC | Age: OVER 89
Discharge: HOME | End: 2025-05-08
Payer: MEDICARE

## 2025-05-08 DIAGNOSIS — I50.30 HEART FAILURE WITH PRESERVED EJECTION FRACTION, UNSPECIFIED HF CHRONICITY: ICD-10-CM

## 2025-05-08 DIAGNOSIS — R14.0 DISTENDED ABDOMEN: ICD-10-CM

## 2025-05-08 PROCEDURE — 74176 CT ABD & PELVIS W/O CONTRAST: CPT

## 2025-05-19 ENCOUNTER — TELEPHONE (OUTPATIENT)
Dept: CARDIOLOGY | Facility: HOSPITAL | Age: OVER 89
End: 2025-05-19
Payer: MEDICARE

## 2025-05-19 DIAGNOSIS — I50.9 CONGESTIVE HEART FAILURE, UNSPECIFIED HF CHRONICITY, UNSPECIFIED HEART FAILURE TYPE: ICD-10-CM

## 2025-05-19 RX ORDER — FUROSEMIDE 20 MG/1
120 TABLET ORAL DAILY
Qty: 540 TABLET | Refills: 0 | Status: SHIPPED | OUTPATIENT
Start: 2025-05-19 | End: 2026-05-19

## 2025-05-19 NOTE — TELEPHONE ENCOUNTER
Called and spoke to pt, reviewed results per MD message to pt- no ascites noted on CT scan. Pt verbalized understanding of conversation and thankful for the call.

## 2025-05-29 ENCOUNTER — APPOINTMENT (OUTPATIENT)
Dept: PRIMARY CARE | Facility: CLINIC | Age: OVER 89
End: 2025-05-29
Payer: MEDICARE

## 2025-05-29 VITALS
BODY MASS INDEX: 29.7 KG/M2 | WEIGHT: 196 LBS | HEIGHT: 68 IN | OXYGEN SATURATION: 90 % | SYSTOLIC BLOOD PRESSURE: 112 MMHG | HEART RATE: 65 BPM | DIASTOLIC BLOOD PRESSURE: 55 MMHG

## 2025-05-29 DIAGNOSIS — N18.4 ANEMIA DUE TO STAGE 4 CHRONIC KIDNEY DISEASE: ICD-10-CM

## 2025-05-29 DIAGNOSIS — K21.9 GASTROESOPHAGEAL REFLUX DISEASE, UNSPECIFIED WHETHER ESOPHAGITIS PRESENT: ICD-10-CM

## 2025-05-29 DIAGNOSIS — K22.70 BARRETT'S ESOPHAGUS WITHOUT DYSPLASIA: Primary | ICD-10-CM

## 2025-05-29 DIAGNOSIS — R60.0 BILATERAL LOWER EXTREMITY EDEMA: ICD-10-CM

## 2025-05-29 DIAGNOSIS — D63.1 ANEMIA DUE TO STAGE 4 CHRONIC KIDNEY DISEASE: ICD-10-CM

## 2025-05-29 DIAGNOSIS — N18.4 CKD (CHRONIC KIDNEY DISEASE), STAGE IV (MULTI): ICD-10-CM

## 2025-05-29 DIAGNOSIS — K44.9 HIATAL HERNIA: ICD-10-CM

## 2025-05-29 PROCEDURE — 1159F MED LIST DOCD IN RCRD: CPT | Performed by: INTERNAL MEDICINE

## 2025-05-29 PROCEDURE — 3078F DIAST BP <80 MM HG: CPT | Performed by: INTERNAL MEDICINE

## 2025-05-29 PROCEDURE — 3074F SYST BP LT 130 MM HG: CPT | Performed by: INTERNAL MEDICINE

## 2025-05-29 PROCEDURE — 99214 OFFICE O/P EST MOD 30 MIN: CPT | Performed by: INTERNAL MEDICINE

## 2025-05-29 PROCEDURE — G2211 COMPLEX E/M VISIT ADD ON: HCPCS | Performed by: INTERNAL MEDICINE

## 2025-05-29 NOTE — PROGRESS NOTES
"Subjective   Patient ID: Jf Watts \"ZORAN" is a 89 y.o. male who presents for Follow-up.  History of Present Illness  Jf Watts \"ZORAN" is a 89 year old male with kidney disease who presents with concerns about weight fluctuations and fluid retention.    He has experienced weight fluctuations, with his weight ranging from 192 to 196 pounds. Recently, he noticed an increase in weight and is concerned about fluid retention. A CT scan and ultrasound showed no evidence of fluid in the abdomen. He is attempting to manage his weight by reducing food intake and avoiding salt, although this is challenging due to the meals provided at his [facility] home.    He has a large hiatal hernia, which has been present for a long time. He takes omeprazole to manage symptoms and reports no heartburn. He requests a renewal of his omeprazole prescription.    He was informed of a small amount of fluid around his right lung. He experiences shortness of breath when lying down or bending over, which he attributes to pressure on his diaphragm. Repositioning alleviates the breathlessness.    He has a history of kidney disease and is concerned about his kidney function, mentioning a creatinine level of 2.2 and a GFR of 26. He is cautious about medication doses to avoid further kidney damage. He has been taking five water pills daily due to weight concerns.    He reports a history of low sodium levels, which once led to hospitalization, but recent tests show normal sodium levels. He is aware of being slightly anemic. He takes a supplement called 'Balance of Nature' to ensure adequate fruit and vegetable intake, noting it has improved his energy levels.    Review of Systems  ROS otherwise negative aside from what was mentioned above in HPI.    Objective     /55 (BP Location: Left arm, Patient Position: Sitting)   Pulse 65   Ht 1.727 m (5' 8\")   Wt 88.9 kg (196 lb)   SpO2 90%   BMI 29.80 kg/m²      Current Outpatient " Medications   Medication Instructions    aspirin 81 mg EC tablet 1 tablet, Every other day    furosemide (LASIX) 120 mg, oral, Daily    losartan (COZAAR) 25 mg, oral, Daily    melatonin 10 mg capsule 1 capsule, Nightly    metoclopramide (REGLAN) 10 mg, oral, 4 times daily PRN    metoprolol succinate XL (TOPROL-XL) 100 mg, oral, Daily    mirtazapine (REMERON) 22.5 mg, oral, Nightly    multivit-iron-minerals-folic acid (Centrum Silver) 0.4 mg-300 mcg- 250 mcg tab 1 tablet, Daily    nepafenac (Nevanac) 0.1 % ophthalmic suspension 1 drop, 3 times daily    omeprazole (PRILOSEC) 40 mg, oral, Daily before breakfast, Do not crush or chew.    simvastatin (ZOCOR) 20 mg, oral, Nightly    spironolactone (ALDACTONE) 25 mg, oral, Daily    timolol (Timoptic) 0.5 % ophthalmic solution Every 12 hours    traZODone (DESYREL) 50 mg, oral, Nightly       Physical Exam  Constitutional:       General: He is not in acute distress.     Appearance: Normal appearance.   HENT:      Nose: No congestion.      Mouth/Throat:      Pharynx: Oropharynx is clear. No oropharyngeal exudate.   Neck:      Comments: No JVD or thyromegaly  Cardiovascular:      Rate and Rhythm: Normal rate and regular rhythm.      Pulses: Normal pulses.      Heart sounds: Normal heart sounds.   Pulmonary:      Effort: Pulmonary effort is normal.      Breath sounds: Normal breath sounds.   Abdominal:      General: Abdomen is flat. Bowel sounds are normal.      Palpations: Abdomen is soft. There is no mass.      Tenderness: There is no abdominal tenderness.   Musculoskeletal:         General: No tenderness.      Right lower leg: Edema present.      Left lower leg: Edema present.      Comments: Compression stockings, nontender   Lymphadenopathy:      Cervical: No cervical adenopathy.   Neurological:      General: No focal deficit present.      Mental Status: He is alert.      Gait: Gait abnormal.      Comments: Ambulatory with cane   Psychiatric:         Attention and  Perception: Attention normal.         Mood and Affect: Mood normal. Mood is not anxious or depressed. Affect is not blunt, angry or tearful.         Speech: Speech normal.         Behavior: Behavior is slowed. Behavior is not agitated. Behavior is cooperative.         Cognition and Memory: Cognition normal.      Comments: Comfortable and relaxed         Wt Readings from Last 5 Encounters:   05/29/25 88.9 kg (196 lb)   05/05/25 91.2 kg (201 lb)   04/22/25 91.3 kg (201 lb 6 oz)   02/05/25 88.9 kg (196 lb)   12/16/24 90.7 kg (200 lb)      BP Readings from Last 5 Encounters:   05/29/25 112/55   05/05/25 163/84   04/28/25 124/64   04/22/25 98/51   02/05/25 126/74      Physical Exam  VITALS: BP- 116/70  MEASUREMENTS: Weight- 196.     Results  LABS  Sodium: 139 (04/22/2025)  Creatinine: 2.2 (04/22/2025)  GFR: 26 (04/22/2025)    RADIOLOGY  Abdominal CT: No evidence of ascites; large hiatal hernia; no tumor or growth, normal liver, spleen, and kidneys  Abdominal ultrasound: No ascites, normal liver, incidental kidney cyst (01/10/2025)  Chest x-ray: Trace pleural effusion on the right, no pulmonary edema (05/2024)  Interval consultations cardiology, nephrology, behavior therapy, and ophthalmology were reviewed  Assessment & Plan  Stage 4 chronic kidney disease  Stage 4 CKD with GFR 26, creatinine 2.2. Blood pressure controlled at 116/70. Advised to reduce diuretic use to prevent prerenal kidney damage.  - Continue current blood pressure management.  - Adjust diuretic dosage to 4 pills per day.  - Monitor kidney function regularly.  - Avoid nephrotoxic medications.    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  Stable    Pleural effusion, right side  Small right-sided pleural effusion, not clinically significant, likely related to heart failure.    Hiatal hernia  Chronic hiatal hernia, asymptomatic, managed with omeprazole.  - Continue omeprazole.  - Renew prescription for omeprazole.        Juancho  OUMAR Hernandez MD     This medical note was created with the assistance of artificial intelligence (AI) for documentation purposes. The content has been reviewed and confirmed by the healthcare provider for accuracy and completeness. Patient consented to the use of audio recording and use of AI during their visit.

## 2025-06-09 ENCOUNTER — APPOINTMENT (OUTPATIENT)
Dept: BEHAVIORAL HEALTH | Facility: CLINIC | Age: OVER 89
End: 2025-06-09
Payer: MEDICARE

## 2025-07-15 ENCOUNTER — APPOINTMENT (OUTPATIENT)
Dept: PRIMARY CARE | Facility: CLINIC | Age: OVER 89
End: 2025-07-15
Payer: MEDICARE

## 2025-07-22 ENCOUNTER — APPOINTMENT (OUTPATIENT)
Dept: PRIMARY CARE | Facility: CLINIC | Age: OVER 89
End: 2025-07-22
Payer: MEDICARE

## 2025-07-22 VITALS
HEIGHT: 68 IN | BODY MASS INDEX: 29.55 KG/M2 | OXYGEN SATURATION: 98 % | SYSTOLIC BLOOD PRESSURE: 132 MMHG | DIASTOLIC BLOOD PRESSURE: 56 MMHG | WEIGHT: 195 LBS | HEART RATE: 55 BPM

## 2025-07-22 DIAGNOSIS — K22.70 BARRETT'S ESOPHAGUS WITHOUT DYSPLASIA: ICD-10-CM

## 2025-07-22 DIAGNOSIS — K21.9 REFLUX LARYNGITIS: ICD-10-CM

## 2025-07-22 DIAGNOSIS — N18.4 CKD (CHRONIC KIDNEY DISEASE), STAGE IV (MULTI): ICD-10-CM

## 2025-07-22 DIAGNOSIS — I10 HYPERTENSION, UNSPECIFIED TYPE: ICD-10-CM

## 2025-07-22 DIAGNOSIS — J04.0 REFLUX LARYNGITIS: ICD-10-CM

## 2025-07-22 DIAGNOSIS — K21.9 GASTROESOPHAGEAL REFLUX DISEASE, UNSPECIFIED WHETHER ESOPHAGITIS PRESENT: Primary | ICD-10-CM

## 2025-07-22 DIAGNOSIS — K21.00 GASTROESOPHAGEAL REFLUX DISEASE WITH ESOPHAGITIS WITHOUT HEMORRHAGE: ICD-10-CM

## 2025-07-22 DIAGNOSIS — K44.9 HIATAL HERNIA: ICD-10-CM

## 2025-07-22 DIAGNOSIS — R60.0 BILATERAL LOWER EXTREMITY EDEMA: ICD-10-CM

## 2025-07-22 PROCEDURE — 3078F DIAST BP <80 MM HG: CPT | Performed by: INTERNAL MEDICINE

## 2025-07-22 PROCEDURE — 3075F SYST BP GE 130 - 139MM HG: CPT | Performed by: INTERNAL MEDICINE

## 2025-07-22 PROCEDURE — 1159F MED LIST DOCD IN RCRD: CPT | Performed by: INTERNAL MEDICINE

## 2025-07-22 PROCEDURE — 99214 OFFICE O/P EST MOD 30 MIN: CPT | Performed by: INTERNAL MEDICINE

## 2025-07-22 PROCEDURE — G2211 COMPLEX E/M VISIT ADD ON: HCPCS | Performed by: INTERNAL MEDICINE

## 2025-07-22 ASSESSMENT — ENCOUNTER SYMPTOMS
DEPRESSION: 0
OCCASIONAL FEELINGS OF UNSTEADINESS: 1
LOSS OF SENSATION IN FEET: 0

## 2025-07-22 NOTE — PROGRESS NOTES
"Subjective   Patient ID: Jf Watts \"ZORAN" is a 89 y.o. male who presents for Follow-up.  History of Present Illness  Jf Watts \"ZORAN" is a 89 year old male with chronic kidney disease who presents with a nagging cough and concerns about kidney function.    He has a persistent, non-productive cough that feels like it originates from the trachea. Described as a 'nagging cough', it does not produce any phlegm and sometimes induces gagging. Cough drops provide relief for most of the day. He questions whether this cough is related to his known hiatal hernia. No heartburn or dysphagia, but occasional hoarseness and a gravelly voice are noted.    He has a history of a large hiatal hernia and Hinkle's esophagus. Omeprazole has been taken for 20 years, which helps reduce acid reflux symptoms, although reflux without the burning sensation persists.    Chronic kidney disease is a significant concern, with a GFR of 26. He is taking furosemide 20 mg, four tablets daily, to manage fluid retention. He has maintained this dose for several months. He wants to avoid dialysis. Poor appetite is reported, with only one meal a day, and a stable weight around 195 pounds, attributed to fluid retention.    He experiences numbness and tingling in his arm and hand, particularly when sitting or sleeping in certain positions. The sensation is described as his arm going 'dead', requiring shaking to restore feeling. He is unsure if this is related to a pinched nerve or another condition.    His feet feel 'dead' and sometimes give a sensation of rocking when standing. Concerns about the impact on balance and mobility are expressed.  He is pleased and happy in his current living environment at [facility]  Review of Systems  ROS otherwise negative aside from what was mentioned above in HPI.    Objective     /56 (BP Location: Left arm, Patient Position: Sitting)   Pulse 55   Ht 1.727 m (5' 8\")   Wt 88.5 kg (195 lb)   SpO2 98%   " BMI 29.65 kg/m²    Wt Readings from Last 5 Encounters:   07/22/25 88.5 kg (195 lb)   05/29/25 88.9 kg (196 lb)   05/05/25 91.2 kg (201 lb)   04/22/25 91.3 kg (201 lb 6 oz)   02/05/25 88.9 kg (196 lb)      BP Readings from Last 5 Encounters:   07/22/25 132/56   05/29/25 112/55   05/05/25 163/84   04/28/25 124/64   04/22/25 98/51      Current Outpatient Medications   Medication Instructions    aspirin 81 mg EC tablet 1 tablet, Every other day    furosemide (LASIX) 120 mg, oral, Daily    losartan (COZAAR) 25 mg, oral, Daily    melatonin 10 mg capsule 1 capsule, Nightly    metoclopramide (REGLAN) 10 mg, oral, 4 times daily PRN    metoprolol succinate XL (TOPROL-XL) 100 mg, oral, Daily    mirtazapine (REMERON) 22.5 mg, oral, Nightly    multivit-iron-minerals-folic acid (Centrum Silver) 0.4 mg-300 mcg- 250 mcg tab 1 tablet, Daily    nepafenac (Nevanac) 0.1 % ophthalmic suspension 1 drop, 3 times daily    omeprazole (PRILOSEC) 40 mg, oral, Daily before breakfast, Do not crush or chew.    simvastatin (ZOCOR) 20 mg, oral, Nightly    spironolactone (ALDACTONE) 25 mg, oral, Daily    timolol (Timoptic) 0.5 % ophthalmic solution Every 12 hours    traZODone (DESYREL) 50 mg, oral, Nightly       Physical Exam  Constitutional:       General: He is not in acute distress.     Appearance: Normal appearance.   HENT:      Nose: No congestion.      Mouth/Throat:      Pharynx: Oropharynx is clear. No oropharyngeal exudate.   Neck:      Comments: No JVD or thyromegaly  Cardiovascular:      Rate and Rhythm: Normal rate and regular rhythm.      Pulses: Normal pulses.      Heart sounds: Normal heart sounds.   Pulmonary:      Effort: Pulmonary effort is normal.      Breath sounds: Normal breath sounds.   Abdominal:      General: Abdomen is flat. Bowel sounds are normal.      Palpations: Abdomen is soft. There is no mass.      Tenderness: There is no abdominal tenderness.     Musculoskeletal:         General: No tenderness.      Right lower leg:  Edema present.      Left lower leg: Edema present.      Comments: Compression stockings, nontender   Lymphadenopathy:      Cervical: No cervical adenopathy.     Neurological:      General: No focal deficit present.      Mental Status: He is alert.      Gait: Gait abnormal.      Comments: Ambulatory with cane   Psychiatric:         Attention and Perception: Attention normal.         Mood and Affect: Mood normal. Mood is not anxious or depressed. Affect is not blunt, angry or tearful.         Speech: Speech normal.         Behavior: Behavior is slowed. Behavior is not agitated. Behavior is cooperative.         Cognition and Memory: Cognition normal.      Comments: Comfortable and relaxed         Physical Exam  MEASUREMENTS: Weight- 195.     Results  LABS 4/22/2025  H/H 10.5/33%, glucose 108, creatinine 2.2, albumin 4.1, normal LFTs 1/6/2025  GFR: 26 mL/min/1.73 m²    RADIOLOGY    CT scan 5/8/2025: Liver, spleen, adrenal glands, pancreas, gallbladder, and bile ducts are normal. Multiple renal cysts, large hiatal hernia, diverticulosis, brachytherapy seeds in the prostate. High-density right renal lesion, likely a hemorrhagic cyst.  Ultrasound abdomen 1/10/2025 coarse liver, right renal cortical thinning with cyst  X-ray abdomen 1/6/2025 hiatal hernia, stomach seen super diaphragmatic portion  Assessment & Plan  Stage 4 Chronic Kidney Disease (CKD)  Stage 4 CKD with GFR 26. Discussed fluid management and kidney preservation. Anemia due to reduced erythropoietin production.  - Continue furosemide 120 mg oral daily.  - Order blood test to assess kidney function.  - Monitor weight and fluid status.  - Discuss potential adjustment of furosemide dose based on blood test results.    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  Stable, mild    Hiatal Hernia  Large hiatal hernia contributing to cough. No heartburn due to omeprazole. Aware of Hinkle's esophagus risk.  - Continue omeprazole  40 mg oral daily before breakfast.  - Provide dietary and lifestyle modification advice to manage symptoms.  Small more frequent meals, do not eat before bed or lie down after eating; limit/avoid alcohol, caffeine, carbonated beverages, acidic foods    General Health Maintenance  Has not had a general physical examination in two years June 2023. Emphasized importance of primary care provider.  - Schedule a general physical examination before the end of the year.    VISIT SUMMARY:  During your visit, we discussed your persistent cough, concerns about your kidney function, and other health issues. We reviewed your current medications and made plans for further tests and follow-up care.    YOUR PLAN:  -STAGE 4 CHRONIC KIDNEY DISEASE (CKD): Stage 4 CKD means your kidneys are significantly damaged and not working well. We will continue your current dose of furosemide at 120 mg daily to manage fluid retention. A blood test will be ordered to assess your kidney function, and we will monitor your weight and fluid status closely. Depending on the results, we may adjust your medication.    -ANEMIA IN CKD: Anemia in CKD is due to your kidneys not producing enough of a hormone called erythropoietin, which helps make red blood cells. This condition will be monitored as part of your kidney disease management.    -HIATAL HERNIA: A hiatal hernia occurs when part of your stomach pushes up through your diaphragm. This can contribute to your cough. You should continue taking omeprazole 40 mg daily before breakfast to manage acid reflux. We also discussed dietary and lifestyle changes to help manage your symptoms.    -GENERAL HEALTH MAINTENANCE: It is important to have regular check-ups to monitor your overall health. You have not had a general physical examination in two years, so we recommend scheduling one before the end of the year.    INSTRUCTIONS:  Please schedule a general physical examination before the end of the year.  Additionally, follow up with the blood test to assess your kidney function as discussed.    Juancho Hernandez MD     This medical note was created with the assistance of artificial intelligence (AI) for documentation purposes. The content has been reviewed and confirmed by the healthcare provider for accuracy and completeness. Patient consented to the use of audio recording and use of AI during their visit.

## 2025-07-29 LAB
ALBUMIN SERPL-MCNC: 4 G/DL (ref 3.6–5.1)
BUN SERPL-MCNC: 46 MG/DL (ref 7–25)
BUN/CREAT SERPL: 19 (CALC) (ref 6–22)
CALCIUM SERPL-MCNC: 9.2 MG/DL (ref 8.6–10.3)
CHLORIDE SERPL-SCNC: 97 MMOL/L (ref 98–110)
CO2 SERPL-SCNC: 26 MMOL/L (ref 20–32)
CREAT SERPL-MCNC: 2.37 MG/DL (ref 0.7–1.22)
EGFRCR SERPLBLD CKD-EPI 2021: 24 ML/MIN/1.73M2
GLUCOSE SERPL-MCNC: 130 MG/DL (ref 65–99)
PHOSPHATE SERPL-MCNC: 3.9 MG/DL (ref 2.1–4.3)
POTASSIUM SERPL-SCNC: 4.7 MMOL/L (ref 3.5–5.3)
SODIUM SERPL-SCNC: 136 MMOL/L (ref 135–146)

## 2025-08-11 ENCOUNTER — APPOINTMENT (OUTPATIENT)
Dept: BEHAVIORAL HEALTH | Facility: CLINIC | Age: OVER 89
End: 2025-08-11
Payer: MEDICARE

## 2025-08-11 VITALS
SYSTOLIC BLOOD PRESSURE: 159 MMHG | HEART RATE: 67 BPM | DIASTOLIC BLOOD PRESSURE: 74 MMHG | HEIGHT: 68 IN | BODY MASS INDEX: 29.55 KG/M2 | WEIGHT: 195 LBS | TEMPERATURE: 98.2 F

## 2025-08-11 DIAGNOSIS — F41.9 ANXIETY DISORDER, UNSPECIFIED TYPE: ICD-10-CM

## 2025-08-11 DIAGNOSIS — G47.00 INSOMNIA, UNSPECIFIED TYPE: ICD-10-CM

## 2025-08-11 DIAGNOSIS — F43.21 ADJUSTMENT DISORDER WITH DEPRESSED MOOD: ICD-10-CM

## 2025-08-11 PROCEDURE — 3078F DIAST BP <80 MM HG: CPT | Performed by: PSYCHIATRY & NEUROLOGY

## 2025-08-11 PROCEDURE — 3077F SYST BP >= 140 MM HG: CPT | Performed by: PSYCHIATRY & NEUROLOGY

## 2025-08-11 PROCEDURE — 99214 OFFICE O/P EST MOD 30 MIN: CPT | Performed by: PSYCHIATRY & NEUROLOGY

## 2025-08-11 PROCEDURE — 1036F TOBACCO NON-USER: CPT | Performed by: PSYCHIATRY & NEUROLOGY

## 2025-08-11 PROCEDURE — 1159F MED LIST DOCD IN RCRD: CPT | Performed by: PSYCHIATRY & NEUROLOGY

## 2025-08-11 PROCEDURE — 1160F RVW MEDS BY RX/DR IN RCRD: CPT | Performed by: PSYCHIATRY & NEUROLOGY

## 2025-08-22 ENCOUNTER — OFFICE VISIT (OUTPATIENT)
Dept: CARDIOLOGY | Facility: CLINIC | Age: OVER 89
End: 2025-08-22
Payer: MEDICARE

## 2025-08-22 VITALS
HEART RATE: 64 BPM | BODY MASS INDEX: 30.51 KG/M2 | HEIGHT: 68 IN | SYSTOLIC BLOOD PRESSURE: 110 MMHG | WEIGHT: 201.3 LBS | DIASTOLIC BLOOD PRESSURE: 62 MMHG | OXYGEN SATURATION: 92 %

## 2025-08-22 DIAGNOSIS — I25.10 MILD CAD: ICD-10-CM

## 2025-08-22 DIAGNOSIS — I10 HYPERTENSION, UNSPECIFIED TYPE: ICD-10-CM

## 2025-08-22 DIAGNOSIS — I50.30 HEART FAILURE WITH PRESERVED EJECTION FRACTION, UNSPECIFIED HF CHRONICITY: Primary | ICD-10-CM

## 2025-08-22 PROCEDURE — 1126F AMNT PAIN NOTED NONE PRSNT: CPT | Performed by: INTERNAL MEDICINE

## 2025-08-22 PROCEDURE — 1036F TOBACCO NON-USER: CPT | Performed by: INTERNAL MEDICINE

## 2025-08-22 PROCEDURE — 1159F MED LIST DOCD IN RCRD: CPT | Performed by: INTERNAL MEDICINE

## 2025-08-22 PROCEDURE — 3078F DIAST BP <80 MM HG: CPT | Performed by: INTERNAL MEDICINE

## 2025-08-22 PROCEDURE — 99212 OFFICE O/P EST SF 10 MIN: CPT

## 2025-08-22 PROCEDURE — 3074F SYST BP LT 130 MM HG: CPT | Performed by: INTERNAL MEDICINE

## 2025-08-22 PROCEDURE — 99214 OFFICE O/P EST MOD 30 MIN: CPT | Performed by: INTERNAL MEDICINE

## 2025-08-22 ASSESSMENT — COLUMBIA-SUICIDE SEVERITY RATING SCALE - C-SSRS
6. HAVE YOU EVER DONE ANYTHING, STARTED TO DO ANYTHING, OR PREPARED TO DO ANYTHING TO END YOUR LIFE?: NO
1. IN THE PAST MONTH, HAVE YOU WISHED YOU WERE DEAD OR WISHED YOU COULD GO TO SLEEP AND NOT WAKE UP?: NO
2. HAVE YOU ACTUALLY HAD ANY THOUGHTS OF KILLING YOURSELF?: NO

## 2025-08-22 ASSESSMENT — PAIN SCALES - GENERAL: PAINLEVEL_OUTOF10: 0-NO PAIN

## 2025-08-22 ASSESSMENT — ENCOUNTER SYMPTOMS
BLOATING: 0
COUGH: 0
DYSURIA: 0
ABDOMINAL PAIN: 0
ALTERED MENTAL STATUS: 0
NAUSEA: 0
HEMATURIA: 0
CONSTIPATION: 0
OCCASIONAL FEELINGS OF UNSTEADINESS: 0
CHILLS: 0
VOMITING: 0
HEMOPTYSIS: 0
MEMORY LOSS: 0
LOSS OF SENSATION IN FEET: 0
HEADACHES: 0
FALLS: 0
DEPRESSION: 0
MYALGIAS: 0
DIARRHEA: 0
FEVER: 0

## 2025-08-22 ASSESSMENT — PATIENT HEALTH QUESTIONNAIRE - PHQ9
2. FEELING DOWN, DEPRESSED OR HOPELESS: NOT AT ALL
SUM OF ALL RESPONSES TO PHQ9 QUESTIONS 1 AND 2: 0
1. LITTLE INTEREST OR PLEASURE IN DOING THINGS: NOT AT ALL

## 2025-08-25 ENCOUNTER — APPOINTMENT (OUTPATIENT)
Dept: NEPHROLOGY | Facility: CLINIC | Age: OVER 89
End: 2025-08-25
Payer: MEDICARE

## 2025-08-25 VITALS
OXYGEN SATURATION: 87 % | HEART RATE: 69 BPM | BODY MASS INDEX: 29.95 KG/M2 | DIASTOLIC BLOOD PRESSURE: 52 MMHG | SYSTOLIC BLOOD PRESSURE: 122 MMHG | WEIGHT: 197 LBS

## 2025-08-25 DIAGNOSIS — N18.30 STAGE 3 CHRONIC KIDNEY DISEASE, UNSPECIFIED WHETHER STAGE 3A OR 3B CKD (MULTI): Primary | ICD-10-CM

## 2025-08-25 PROCEDURE — 3074F SYST BP LT 130 MM HG: CPT | Performed by: INTERNAL MEDICINE

## 2025-08-25 PROCEDURE — 99214 OFFICE O/P EST MOD 30 MIN: CPT | Performed by: INTERNAL MEDICINE

## 2025-08-25 PROCEDURE — 3078F DIAST BP <80 MM HG: CPT | Performed by: INTERNAL MEDICINE

## 2025-08-26 ENCOUNTER — HOSPITAL ENCOUNTER (EMERGENCY)
Facility: HOSPITAL | Age: OVER 89
Discharge: HOME | End: 2025-08-26
Attending: STUDENT IN AN ORGANIZED HEALTH CARE EDUCATION/TRAINING PROGRAM
Payer: MEDICARE

## 2025-08-26 VITALS
TEMPERATURE: 97.7 F | OXYGEN SATURATION: 96 % | DIASTOLIC BLOOD PRESSURE: 58 MMHG | SYSTOLIC BLOOD PRESSURE: 120 MMHG | HEART RATE: 63 BPM | RESPIRATION RATE: 16 BRPM

## 2025-08-26 DIAGNOSIS — S91.311A FOOT LACERATION, RIGHT, INITIAL ENCOUNTER: Primary | ICD-10-CM

## 2025-08-26 LAB
HCT VFR BLD AUTO: 29.3 % (ref 41–52)
HGB BLD-MCNC: 9.7 G/DL (ref 13.5–17.5)

## 2025-08-26 PROCEDURE — 90715 TDAP VACCINE 7 YRS/> IM: CPT

## 2025-08-26 PROCEDURE — 2500000005 HC RX 250 GENERAL PHARMACY W/O HCPCS

## 2025-08-26 PROCEDURE — 12002 RPR S/N/AX/GEN/TRNK2.6-7.5CM: CPT

## 2025-08-26 PROCEDURE — 36415 COLL VENOUS BLD VENIPUNCTURE: CPT | Performed by: STUDENT IN AN ORGANIZED HEALTH CARE EDUCATION/TRAINING PROGRAM

## 2025-08-26 PROCEDURE — 90471 IMMUNIZATION ADMIN: CPT

## 2025-08-26 PROCEDURE — 2500000004 HC RX 250 GENERAL PHARMACY W/ HCPCS (ALT 636 FOR OP/ED)

## 2025-08-26 PROCEDURE — 85014 HEMATOCRIT: CPT

## 2025-08-26 PROCEDURE — 36415 COLL VENOUS BLD VENIPUNCTURE: CPT

## 2025-08-26 PROCEDURE — 99284 EMERGENCY DEPT VISIT MOD MDM: CPT | Mod: 25 | Performed by: STUDENT IN AN ORGANIZED HEALTH CARE EDUCATION/TRAINING PROGRAM

## 2025-08-26 RX ORDER — LIDOCAINE HYDROCHLORIDE AND EPINEPHRINE 10; 10 UG/ML; MG/ML
10 INJECTION, SOLUTION INFILTRATION; PERINEURAL ONCE
Status: COMPLETED | OUTPATIENT
Start: 2025-08-26 | End: 2025-08-26

## 2025-08-26 RX ORDER — BACITRACIN ZINC 500 UNIT/G
OINTMENT IN PACKET (EA) TOPICAL ONCE
Status: COMPLETED | OUTPATIENT
Start: 2025-08-26 | End: 2025-08-26

## 2025-08-26 RX ADMIN — TETANUS TOXOID, REDUCED DIPHTHERIA TOXOID AND ACELLULAR PERTUSSIS VACCINE, ADSORBED 0.5 ML: 5; 2.5; 8; 8; 2.5 SUSPENSION INTRAMUSCULAR at 17:42

## 2025-08-26 RX ADMIN — BACITRACIN 2 APPLICATION: 500 OINTMENT TOPICAL at 17:45

## 2025-08-26 RX ADMIN — LIDOCAINE HYDROCHLORIDE AND EPINEPHRINE 10 ML: 10; 10 INJECTION, SOLUTION INFILTRATION; PERINEURAL at 17:17

## 2025-08-26 ASSESSMENT — PAIN SCALES - GENERAL: PAINLEVEL_OUTOF10: 0 - NO PAIN

## 2025-08-26 ASSESSMENT — PAIN - FUNCTIONAL ASSESSMENT: PAIN_FUNCTIONAL_ASSESSMENT: 0-10

## 2025-08-27 LAB
HOLD SPECIMEN: NORMAL

## 2025-09-02 ENCOUNTER — APPOINTMENT (OUTPATIENT)
Dept: PRIMARY CARE | Facility: CLINIC | Age: OVER 89
End: 2025-09-02
Payer: MEDICARE

## 2025-11-18 ENCOUNTER — APPOINTMENT (OUTPATIENT)
Dept: OTOLARYNGOLOGY | Facility: HOSPITAL | Age: OVER 89
End: 2025-11-18
Payer: MEDICARE

## 2025-11-19 ENCOUNTER — APPOINTMENT (OUTPATIENT)
Dept: OTOLARYNGOLOGY | Facility: CLINIC | Age: OVER 89
End: 2025-11-19
Payer: MEDICARE

## 2025-12-01 ENCOUNTER — APPOINTMENT (OUTPATIENT)
Dept: BEHAVIORAL HEALTH | Facility: CLINIC | Age: OVER 89
End: 2025-12-01
Payer: MEDICARE

## 2026-01-12 ENCOUNTER — APPOINTMENT (OUTPATIENT)
Dept: PRIMARY CARE | Facility: CLINIC | Age: OVER 89
End: 2026-01-12
Payer: MEDICARE

## 2026-03-02 ENCOUNTER — APPOINTMENT (OUTPATIENT)
Dept: NEPHROLOGY | Facility: CLINIC | Age: OVER 89
End: 2026-03-02
Payer: MEDICARE